# Patient Record
Sex: FEMALE | Race: WHITE | NOT HISPANIC OR LATINO | Employment: UNEMPLOYED | URBAN - METROPOLITAN AREA
[De-identification: names, ages, dates, MRNs, and addresses within clinical notes are randomized per-mention and may not be internally consistent; named-entity substitution may affect disease eponyms.]

---

## 2024-06-23 ENCOUNTER — HOSPITAL ENCOUNTER (INPATIENT)
Facility: CLINIC | Age: 89
LOS: 12 days | Discharge: NURSING, REHAB OR LONG-TERM CARE | DRG: 480 | End: 2024-07-05
Attending: EMERGENCY MEDICINE | Admitting: INTERNAL MEDICINE
Payer: COMMERCIAL

## 2024-06-23 ENCOUNTER — APPOINTMENT (OUTPATIENT)
Dept: GENERAL RADIOLOGY | Facility: CLINIC | Age: 89
DRG: 480 | End: 2024-06-23
Attending: EMERGENCY MEDICINE
Payer: COMMERCIAL

## 2024-06-23 ENCOUNTER — APPOINTMENT (OUTPATIENT)
Dept: CT IMAGING | Facility: CLINIC | Age: 89
DRG: 480 | End: 2024-06-23
Attending: EMERGENCY MEDICINE
Payer: COMMERCIAL

## 2024-06-23 DIAGNOSIS — I50.9 CONGESTIVE HEART FAILURE, UNSPECIFIED HF CHRONICITY, UNSPECIFIED HEART FAILURE TYPE (H): ICD-10-CM

## 2024-06-23 DIAGNOSIS — S72.145A CLOSED NONDISPLACED INTERTROCHANTERIC FRACTURE OF LEFT FEMUR, INITIAL ENCOUNTER (H): Primary | ICD-10-CM

## 2024-06-23 DIAGNOSIS — E87.1 HYPONATREMIA: ICD-10-CM

## 2024-06-23 DIAGNOSIS — E11.65 TYPE 2 DIABETES MELLITUS WITH HYPERGLYCEMIA, WITHOUT LONG-TERM CURRENT USE OF INSULIN (H): ICD-10-CM
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
## 2024-06-23 DIAGNOSIS — R33.9 URINARY RETENTION: ICD-10-CM

## 2024-06-23 DIAGNOSIS — N39.0 URINARY TRACT INFECTION WITH HEMATURIA, SITE UNSPECIFIED: ICD-10-CM

## 2024-06-23 DIAGNOSIS — S72.002A HIP FRACTURE, LEFT, CLOSED, INITIAL ENCOUNTER (H): ICD-10-CM

## 2024-06-23 DIAGNOSIS — R31.9 URINARY TRACT INFECTION WITH HEMATURIA, SITE UNSPECIFIED: ICD-10-CM

## 2024-06-23 LAB
ABO/RH(D): NORMAL
ALBUMIN SERPL BCG-MCNC: 4 G/DL (ref 3.5–5.2)
ALP SERPL-CCNC: 50 U/L (ref 40–150)
ALT SERPL W P-5'-P-CCNC: 27 U/L (ref 0–50)
ANION GAP SERPL CALCULATED.3IONS-SCNC: 11 MMOL/L (ref 7–15)
ANTIBODY SCREEN: NEGATIVE
APTT PPP: 33 SECONDS (ref 22–38)
AST SERPL W P-5'-P-CCNC: 27 U/L (ref 0–45)
BASOPHILS # BLD AUTO: 0.1 10E3/UL (ref 0–0.2)
BASOPHILS NFR BLD AUTO: 1 %
BILIRUB SERPL-MCNC: 0.4 MG/DL
BUN SERPL-MCNC: 36.7 MG/DL (ref 8–23)
CALCIUM SERPL-MCNC: 9.9 MG/DL (ref 8.2–9.6)
CHLORIDE SERPL-SCNC: 93 MMOL/L (ref 98–107)
CREAT SERPL-MCNC: 1.07 MG/DL (ref 0.51–0.95)
DEPRECATED HCO3 PLAS-SCNC: 29 MMOL/L (ref 22–29)
EGFRCR SERPLBLD CKD-EPI 2021: 49 ML/MIN/1.73M2
EOSINOPHIL # BLD AUTO: 0.1 10E3/UL (ref 0–0.7)
EOSINOPHIL NFR BLD AUTO: 2 %
ERYTHROCYTE [DISTWIDTH] IN BLOOD BY AUTOMATED COUNT: 14.8 % (ref 10–15)
GLUCOSE BLDC GLUCOMTR-MCNC: 230 MG/DL (ref 70–99)
GLUCOSE BLDC GLUCOMTR-MCNC: 236 MG/DL (ref 70–99)
GLUCOSE BLDC GLUCOMTR-MCNC: 285 MG/DL (ref 70–99)
GLUCOSE SERPL-MCNC: 214 MG/DL (ref 70–99)
HCT VFR BLD AUTO: 31.9 % (ref 35–47)
HGB BLD-MCNC: 10.1 G/DL (ref 11.7–15.7)
HOLD SPECIMEN: NORMAL
IMM GRANULOCYTES # BLD: 0 10E3/UL
IMM GRANULOCYTES NFR BLD: 1 %
INR PPP: 1.01 (ref 0.85–1.15)
LYMPHOCYTES # BLD AUTO: 0.7 10E3/UL (ref 0.8–5.3)
LYMPHOCYTES NFR BLD AUTO: 9 %
MCH RBC QN AUTO: 27.8 PG (ref 26.5–33)
MCHC RBC AUTO-ENTMCNC: 31.7 G/DL (ref 31.5–36.5)
MCV RBC AUTO: 88 FL (ref 78–100)
MONOCYTES # BLD AUTO: 0.5 10E3/UL (ref 0–1.3)
MONOCYTES NFR BLD AUTO: 8 %
NEUTROPHILS # BLD AUTO: 5.7 10E3/UL (ref 1.6–8.3)
NEUTROPHILS NFR BLD AUTO: 80 %
NRBC # BLD AUTO: 0 10E3/UL
NRBC BLD AUTO-RTO: 0 /100
PLATELET # BLD AUTO: 192 10E3/UL (ref 150–450)
POTASSIUM SERPL-SCNC: 4.4 MMOL/L (ref 3.4–5.3)
PROT SERPL-MCNC: 6.7 G/DL (ref 6.4–8.3)
RBC # BLD AUTO: 3.63 10E6/UL (ref 3.8–5.2)
SODIUM SERPL-SCNC: 133 MMOL/L (ref 135–145)
SPECIMEN EXPIRATION DATE: NORMAL
VIT D+METAB SERPL-MCNC: 67 NG/ML (ref 20–50)
WBC # BLD AUTO: 7.1 10E3/UL (ref 4–11)

## 2024-06-23 PROCEDURE — 73502 X-RAY EXAM HIP UNI 2-3 VIEWS: CPT

## 2024-06-23 PROCEDURE — 250N000012 HC RX MED GY IP 250 OP 636 PS 637: Performed by: INTERNAL MEDICINE

## 2024-06-23 PROCEDURE — 99285 EMERGENCY DEPT VISIT HI MDM: CPT | Mod: 25

## 2024-06-23 PROCEDURE — 80053 COMPREHEN METABOLIC PANEL: CPT | Performed by: EMERGENCY MEDICINE

## 2024-06-23 PROCEDURE — 85730 THROMBOPLASTIN TIME PARTIAL: CPT | Performed by: EMERGENCY MEDICINE

## 2024-06-23 PROCEDURE — 99222 1ST HOSP IP/OBS MODERATE 55: CPT | Performed by: INTERNAL MEDICINE

## 2024-06-23 PROCEDURE — 120N000001 HC R&B MED SURG/OB

## 2024-06-23 PROCEDURE — 72125 CT NECK SPINE W/O DYE: CPT

## 2024-06-23 PROCEDURE — 70450 CT HEAD/BRAIN W/O DYE: CPT

## 2024-06-23 PROCEDURE — 82306 VITAMIN D 25 HYDROXY: CPT | Performed by: COMMUNITY HEALTH WORKER

## 2024-06-23 PROCEDURE — 93005 ELECTROCARDIOGRAM TRACING: CPT

## 2024-06-23 PROCEDURE — 71045 X-RAY EXAM CHEST 1 VIEW: CPT

## 2024-06-23 PROCEDURE — 250N000011 HC RX IP 250 OP 636: Mod: JZ | Performed by: INTERNAL MEDICINE

## 2024-06-23 PROCEDURE — 85025 COMPLETE CBC W/AUTO DIFF WBC: CPT | Performed by: EMERGENCY MEDICINE

## 2024-06-23 PROCEDURE — 85610 PROTHROMBIN TIME: CPT | Performed by: EMERGENCY MEDICINE

## 2024-06-23 PROCEDURE — 86900 BLOOD TYPING SEROLOGIC ABO: CPT | Performed by: EMERGENCY MEDICINE

## 2024-06-23 PROCEDURE — 250N000011 HC RX IP 250 OP 636: Mod: JZ | Performed by: EMERGENCY MEDICINE

## 2024-06-23 PROCEDURE — 36415 COLL VENOUS BLD VENIPUNCTURE: CPT | Performed by: EMERGENCY MEDICINE

## 2024-06-23 PROCEDURE — 250N000013 HC RX MED GY IP 250 OP 250 PS 637: Performed by: INTERNAL MEDICINE

## 2024-06-23 PROCEDURE — 96374 THER/PROPH/DIAG INJ IV PUSH: CPT

## 2024-06-23 RX ORDER — MORPHINE SULFATE 2 MG/ML
2 INJECTION, SOLUTION INTRAMUSCULAR; INTRAVENOUS ONCE
Status: COMPLETED | OUTPATIENT
Start: 2024-06-23 | End: 2024-06-23

## 2024-06-23 RX ORDER — ACETAMINOPHEN 650 MG/1
650 SUPPOSITORY RECTAL EVERY 4 HOURS PRN
Status: DISCONTINUED | OUTPATIENT
Start: 2024-06-23 | End: 2024-07-05 | Stop reason: HOSPADM

## 2024-06-23 RX ORDER — BENAZEPRIL HYDROCHLORIDE 40 MG/1
40 TABLET ORAL DAILY
Status: ON HOLD | COMMUNITY
End: 2024-06-28

## 2024-06-23 RX ORDER — NALOXONE HYDROCHLORIDE 0.4 MG/ML
0.2 INJECTION, SOLUTION INTRAMUSCULAR; INTRAVENOUS; SUBCUTANEOUS
Status: DISCONTINUED | OUTPATIENT
Start: 2024-06-23 | End: 2024-07-05 | Stop reason: HOSPADM

## 2024-06-23 RX ORDER — LIDOCAINE 40 MG/G
CREAM TOPICAL
Status: DISCONTINUED | OUTPATIENT
Start: 2024-06-23 | End: 2024-06-24

## 2024-06-23 RX ORDER — ONDANSETRON 2 MG/ML
4 INJECTION INTRAMUSCULAR; INTRAVENOUS EVERY 6 HOURS PRN
Status: DISCONTINUED | OUTPATIENT
Start: 2024-06-23 | End: 2024-06-24

## 2024-06-23 RX ORDER — HYDROMORPHONE HYDROCHLORIDE 1 MG/ML
0.3 INJECTION, SOLUTION INTRAMUSCULAR; INTRAVENOUS; SUBCUTANEOUS
Status: DISCONTINUED | OUTPATIENT
Start: 2024-06-23 | End: 2024-06-24

## 2024-06-23 RX ORDER — ACETAMINOPHEN 500 MG
500 TABLET ORAL EVERY 6 HOURS PRN
Status: DISCONTINUED | OUTPATIENT
Start: 2024-06-23 | End: 2024-06-23

## 2024-06-23 RX ORDER — SIMVASTATIN 10 MG
1 TABLET ORAL EVERY EVENING
COMMUNITY
Start: 2023-06-01 | End: 2024-08-07

## 2024-06-23 RX ORDER — NALOXONE HYDROCHLORIDE 0.4 MG/ML
0.4 INJECTION, SOLUTION INTRAMUSCULAR; INTRAVENOUS; SUBCUTANEOUS
Status: DISCONTINUED | OUTPATIENT
Start: 2024-06-23 | End: 2024-07-05 | Stop reason: HOSPADM

## 2024-06-23 RX ORDER — AMOXICILLIN 250 MG
2 CAPSULE ORAL 2 TIMES DAILY PRN
Status: DISCONTINUED | OUTPATIENT
Start: 2024-06-23 | End: 2024-07-05 | Stop reason: HOSPADM

## 2024-06-23 RX ORDER — LORAZEPAM 0.5 MG
1 TABLET ORAL DAILY
COMMUNITY
End: 2024-08-07

## 2024-06-23 RX ORDER — AMOXICILLIN 250 MG
2 CAPSULE ORAL 2 TIMES DAILY
Status: DISCONTINUED | OUTPATIENT
Start: 2024-06-23 | End: 2024-06-24

## 2024-06-23 RX ORDER — MORPHINE SULFATE 4 MG/ML
3 INJECTION, SOLUTION INTRAMUSCULAR; INTRAVENOUS
Status: COMPLETED | OUTPATIENT
Start: 2024-06-23 | End: 2024-06-23

## 2024-06-23 RX ORDER — ACETAMINOPHEN 500 MG
1000 TABLET ORAL 3 TIMES DAILY
Status: DISCONTINUED | OUTPATIENT
Start: 2024-06-23 | End: 2024-06-24

## 2024-06-23 RX ORDER — SIMVASTATIN 10 MG
10 TABLET ORAL EVERY EVENING
Status: DISCONTINUED | OUTPATIENT
Start: 2024-06-23 | End: 2024-07-05 | Stop reason: HOSPADM

## 2024-06-23 RX ORDER — CALCIUM CARBONATE 500(1250)
1 TABLET ORAL DAILY
COMMUNITY
End: 2024-08-07

## 2024-06-23 RX ORDER — AMOXICILLIN 250 MG
1 CAPSULE ORAL 2 TIMES DAILY PRN
Status: DISCONTINUED | OUTPATIENT
Start: 2024-06-23 | End: 2024-07-05 | Stop reason: HOSPADM

## 2024-06-23 RX ORDER — CHLORTHALIDONE 25 MG/1
1 TABLET ORAL DAILY
Status: ON HOLD | COMMUNITY
Start: 2024-06-03 | End: 2024-06-28

## 2024-06-23 RX ORDER — METFORMIN HCL 500 MG
500 TABLET, EXTENDED RELEASE 24 HR ORAL 2 TIMES DAILY WITH MEALS
Status: ON HOLD | COMMUNITY
Start: 2023-06-01 | End: 2024-06-29

## 2024-06-23 RX ORDER — AMOXICILLIN 250 MG
1 CAPSULE ORAL 2 TIMES DAILY
Status: DISCONTINUED | OUTPATIENT
Start: 2024-06-23 | End: 2024-06-24

## 2024-06-23 RX ORDER — ONDANSETRON 4 MG/1
4 TABLET, ORALLY DISINTEGRATING ORAL EVERY 6 HOURS PRN
Status: DISCONTINUED | OUTPATIENT
Start: 2024-06-23 | End: 2024-06-24

## 2024-06-23 RX ORDER — NICOTINE POLACRILEX 4 MG
15-30 LOZENGE BUCCAL
Status: DISCONTINUED | OUTPATIENT
Start: 2024-06-23 | End: 2024-06-24

## 2024-06-23 RX ORDER — DEXTROSE MONOHYDRATE 25 G/50ML
25-50 INJECTION, SOLUTION INTRAVENOUS
Status: DISCONTINUED | OUTPATIENT
Start: 2024-06-23 | End: 2024-06-24

## 2024-06-23 RX ORDER — ACETAMINOPHEN 325 MG/1
650 TABLET ORAL EVERY 4 HOURS PRN
Status: DISCONTINUED | OUTPATIENT
Start: 2024-06-23 | End: 2024-06-23 | Stop reason: ALTCHOICE

## 2024-06-23 RX ORDER — AMLODIPINE AND BENAZEPRIL HYDROCHLORIDE 5; 40 MG/1; MG/1
1 CAPSULE ORAL DAILY
COMMUNITY
Start: 2023-06-01 | End: 2024-06-23

## 2024-06-23 RX ORDER — CALCIUM CARBONATE 500 MG/1
1000 TABLET, CHEWABLE ORAL 4 TIMES DAILY PRN
Status: DISCONTINUED | OUTPATIENT
Start: 2024-06-23 | End: 2024-07-05 | Stop reason: HOSPADM

## 2024-06-23 RX ORDER — ACETAMINOPHEN 500 MG
500 TABLET ORAL EVERY 6 HOURS PRN
Status: ON HOLD | COMMUNITY
End: 2024-06-25

## 2024-06-23 RX ADMIN — HYDROMORPHONE HYDROCHLORIDE 0.3 MG: 1 INJECTION, SOLUTION INTRAMUSCULAR; INTRAVENOUS; SUBCUTANEOUS at 14:11

## 2024-06-23 RX ADMIN — MORPHINE SULFATE 2 MG: 2 INJECTION, SOLUTION INTRAMUSCULAR; INTRAVENOUS at 11:06

## 2024-06-23 RX ADMIN — INSULIN ASPART 2 UNITS: 100 INJECTION, SOLUTION INTRAVENOUS; SUBCUTANEOUS at 22:37

## 2024-06-23 RX ADMIN — SENNOSIDES AND DOCUSATE SODIUM 1 TABLET: 50; 8.6 TABLET ORAL at 21:32

## 2024-06-23 RX ADMIN — MORPHINE SULFATE 3 MG: 4 INJECTION, SOLUTION INTRAMUSCULAR; INTRAVENOUS at 13:07

## 2024-06-23 RX ADMIN — ACETAMINOPHEN 1000 MG: 500 TABLET, FILM COATED ORAL at 18:23

## 2024-06-23 RX ADMIN — SIMVASTATIN 10 MG: 10 TABLET, FILM COATED ORAL at 21:32

## 2024-06-23 RX ADMIN — QUETIAPINE FUMARATE 6.25 MG: 25 TABLET ORAL at 21:32

## 2024-06-23 RX ADMIN — MORPHINE SULFATE 3 MG: 4 INJECTION, SOLUTION INTRAMUSCULAR; INTRAVENOUS at 11:43

## 2024-06-23 RX ADMIN — HYDROMORPHONE HYDROCHLORIDE 0.3 MG: 1 INJECTION, SOLUTION INTRAMUSCULAR; INTRAVENOUS; SUBCUTANEOUS at 18:23

## 2024-06-23 ASSESSMENT — ACTIVITIES OF DAILY LIVING (ADL)
ADLS_ACUITY_SCORE: 29
ADLS_ACUITY_SCORE: 35
ADLS_ACUITY_SCORE: 29
ADLS_ACUITY_SCORE: 35
ADLS_ACUITY_SCORE: 29
ADLS_ACUITY_SCORE: 29
ADLS_ACUITY_SCORE: 35
ADLS_ACUITY_SCORE: 28

## 2024-06-23 NOTE — PLAN OF CARE
Diagnosis:Fall left hip fx  POD#:Surgery 6/24  Mental Status:AxOx4  Activity/dangle: Bedrest, TAPS Q2 turn   Diet:Regular   Pain:IV dilaudid   Tiwari/Voiding:Due to void   02/LDA:S/L  D/C Date:TBD

## 2024-06-23 NOTE — ED PROVIDER NOTES
Emergency Department Note      History of Present Illness     Chief Complaint  Fall    HPI  Lisa Meraz is a 89 year old female who presents to the emergency room after mechanical fall earlier today.  She was attempting to make her bed, and she states that she fell to the ground, while trying to walk over to talk any other sheet.  Denies any loss of consciousness, but does state that she hit her head.  Her main concern is the pain in her left hip, EMS gave 50 mcg of fentanyl.  Left leg is shortened and internally rotated.  Patient is otherwise denying any other medical concerns at this time.      Independent Historian  No    Review of External Notes  Yes I have reviewed the patient's last family medicine note from 3 Margarita of this year the patient was seen for hypertension as well as type 2 diabetes.  Patient is also on dialysis.      Past Medical History   Medical History and Problem List  No past medical history on file.    Medications  No current outpatient medications on file.      Surgical History   No past surgical history on file.      Physical Exam   Patient Vitals for the past 24 hrs:   BP Temp Pulse Resp SpO2 Weight   06/23/24 1112 (!) 143/70 -- -- -- 95 % --   06/23/24 1045 -- 97.8  F (36.6  C) -- 16 -- 49.9 kg (110 lb)   06/23/24 1039 (!) 148/77 -- 90 -- 90 % --       Physical Exam  Vitals: reviewed by me  General: Pt seen on hospital [facility], [facility], cooperative, and alert to conversation  Eyes: Tracking well, clear conjunctiva BL  ENT: MMM, midline trachea.  No evidence of trauma to head or neck.  Lungs: No tachypnea, no accessory muscle use. No respiratory distress.   CV: Rate as above  Abd: Soft, non tender, no guarding, no rebound. Non distended   MSK: no joint effusion.  Left lower extremity is shortened and internally rotated, significant tenderness to palpation to the left hip.  The hip itself is warm and well-perfused, no tenting or skin breakdown or opening  Skin: No rash  Neuro: Clear  speech and no facial droop.  Psych: Not RIS, no e/o AH/VH      Diagnostics   Lab Results   Labs Ordered and Resulted from Time of ED Arrival to Time of ED Departure   COMPREHENSIVE METABOLIC PANEL - Abnormal       Result Value    Sodium 133 (*)     Potassium 4.4      Carbon Dioxide (CO2) 29      Anion Gap 11      Urea Nitrogen 36.7 (*)     Creatinine 1.07 (*)     GFR Estimate 49 (*)     Calcium 9.9 (*)     Chloride 93 (*)     Glucose 214 (*)     Alkaline Phosphatase 50      AST 27      ALT 27      Protein Total 6.7      Albumin 4.0      Bilirubin Total 0.4     CBC WITH PLATELETS AND DIFFERENTIAL - Abnormal    WBC Count 7.1      RBC Count 3.63 (*)     Hemoglobin 10.1 (*)     Hematocrit 31.9 (*)     MCV 88      MCH 27.8      MCHC 31.7      RDW 14.8      Platelet Count 192      % Neutrophils 80      % Lymphocytes 9      % Monocytes 8      % Eosinophils 2      % Basophils 1      % Immature Granulocytes 1      NRBCs per 100 WBC 0      Absolute Neutrophils 5.7      Absolute Lymphocytes 0.7 (*)     Absolute Monocytes 0.5      Absolute Eosinophils 0.1      Absolute Basophils 0.1      Absolute Immature Granulocytes 0.0      Absolute NRBCs 0.0     INR - Normal    INR 1.01     PARTIAL THROMBOPLASTIN TIME - Normal    aPTT 33     TYPE AND SCREEN, ADULT    ABO/RH(D) A POS      Antibody Screen Negative      SPECIMEN EXPIRATION DATE [id number]     ABO/RH TYPE AND SCREEN       Imaging  XR Chest 1 View   Final Result   IMPRESSION: Dual-lead left-sided cardiac conduction device. Trace right pleural effusion with adjacent opacities which likely reflect atelectasis. Right apical scarring. Cardiomegaly with central vascular congestion and mild interstitial edema. Mitral    annulus calcifications.      XR Pelvis w Hip Left 1 View   Final Result   IMPRESSION: Acute intertrochanteric fracture of the proximal left femur. Normal left hip joint alignment. Advanced degenerative arthritis in the left hip. No additional pelvic fracture. Right  total hip arthroplasty, with normal alignment, without    loosening or other complication. Advanced degenerative changes in the lower lumbar spine and SI joints. Severe atherosclerotic calcification.      CT Head w/o Contrast    (Results Pending)   CT Cervical Spine w/o Contrast    (Results Pending)       EKG   No results found for this or any previous visit.      Independent Interpretation  Yes I have independent reviewed the patient's chest x-ray, no obvious in the thorax noted.      ED Course    Medications Administered  Medications - No data to display    Procedures  Procedures         Social Determinants of Health adding to complexity of care  Transportation      Disposition  The patient was admitted to the hospital.       Medical Decision Making / Diagnosis   MIPS     None        MDM  This is a very pleasant 89-year-old female who presents to the emergency room after what appears to be a mechanical fall.  Thankfully she did not lose consciousness but she did hit her head.  Her trauma workup was negative apart from a left hip fracture, and while she is on blood thinners, thankfully there is no evidence of hematoma.  I spoke to the hospitalist team who is very generously agreed to accept care of the patient.  She will be monitored very carefully until the next inpatient bed is available.  We will treat her pain, and she unfortunately has required a couple doses of pain medication here, and treat her supportively as well.        ICD-10 Codes:    ICD-10-CM    1. Fall, initial encounter  W19.XXXA       2. Hip fracture, left, closed, initial encounter (H)  S72.002A                   Louis Lacey MD  06/23/24 1155

## 2024-06-23 NOTE — ED NOTES
Bed: ED03  Expected date:   Expected time:   Means of arrival:   Comments:   89 F fell from standing on thinners left hip pain eta 1027

## 2024-06-23 NOTE — H&P
[facility]    History and Physical - Hospitalist Service       Date of Admission:  6/23/2024    Assessment & Plan   Lisa Meraz is a 89 year old female who presents to the emergency room after mechanical fall earlier today.  She was attempting to make her bed, and she states that she fell to the ground, while trying to walk over to talk any other sheet.  Denies any loss of consciousness, but does state that she hit her head.     ## Mechanical Fall  ## CHT  ## Left IT fracture of proximal femur  Admit to inpatient  Ok to eat but NPO after midnight  Hold DOAC  IV dilaudid for pain, schedule tylenol   Bowel regimen  CTH and CT of cervical spine show no trauma    ## Anxiety  Patient is very anxious and has been unable to be calmed down by family.  They have requested a medication to keep her calm and comfortable.  Will try to avoid benzodiazepines if possible  Attempt seroquel 6.25 mg BID prn and 12.5 mg at bedtime prn.  Avoid anticholinergics    ## Hypertension  ## Intermittent atrial fibrillation  ## Hx of CHF with a PPM  Denies any chest pain or  palpitations  Hold DOAC  Continue benazepril, hold diuretic  Echo from 6/23 and 12/23 showed stable severe MR, LVEF of 69%.    ## DM Type II  Patient is on metformin, she is hyperglycemic with BG > 200  Mod CHO diet  Sliding scale insulin  Hold metformin    ##  Hyperlipidemia  Continue statin    ## Constipation  Bowel regimen    Diet:Mod CHO  DVT Prophylaxis: Pneumatic Compression Devices  Tiwari Catheter: Not present  Lines: None     Cardiac Monitoring: None  Code Status:  Full    Clinically Significant Risk Factors Present on Admission               # Drug Induced Coagulation Defect: home medication list includes an anticoagulant medication    # Hypertension: Home medication list includes antihypertensive(s)    # Anemia: based on hgb <11      Disposition Plan     Medically Ready for Discharge: Anticipated in 2-4 Days  -- to TCU      Ian Toth  MD Dusty  Hospitalist Service  [facility]  Securely message with Maciej (more info)  Text page via [facility] Paging/Directory     ______________________________________________________________________    Chief Complaint   Fall    History is obtained from the patient, electronic health record, and emergency department physician    History of Present Illness   Lisa Meraz is a 89 year old female who presents to the emergency room after mechanical fall earlier today.  She was attempting to make her bed, and she states that she fell to the ground, while trying to walk over to talk any other sheet.  Denies any loss of consciousness, but does state that she hit her head.  Her main concern is the pain in her left hip, EMS gave 50 mcg of fentanyl.  Left leg is shortened and internally rotated.  Patient is otherwise denying any other medical concerns at this time.    She was seen in the ER, stable vital signs, although hypertensive at times, normal oxygenation.  Na is 133, BUN 36, Cr 1.07, normal LFTs, glucose 214, WBC 7.1, hgb 10.1, .       Hip fx, making bed, prox left hip fx, CHT, no LOC.  XR pelvis shows acute left IT fracture of the femur, right OC is intact, CXR shows trace right pleural effusion and cardiac device.  CT of cervical spine show no fracture or subluxation.  CTH shows no acute process, but atrophy and SVID.      Past Medical History    Intermittent Afib on eliquis  Hx of complete AVB now with PPM  Hx of thromboembolism  DM Type II  Severe Mitral regurgitation  Hypertension  OA  Stage III CKD  Hyperlipidemia    Past Surgical History   Right OC  Appendectomy  Tonsillectomy  PPM placement    Prior to Admission Medications   Prior to Admission Medications   Prescriptions Last Dose Informant Patient Reported? Taking?   acetaminophen (TYLENOL) 500 MG tablet Unknown  Yes Yes   Sig: Take 500 mg by mouth every 6 hours as needed for mild pain   apixaban ANTICOAGULANT (ELIQUIS)  2.5 MG tablet 6/22/2024  Yes Yes   Sig: Take 2.5 mg by mouth 2 times daily   benazepril (LOTENSIN) 40 MG tablet 6/22/2024  Yes Yes   Sig: Take 40 mg by mouth daily   calcium carbonate (OS-JHONATHAN) 500 MG TABS 6/22/2024  Yes Yes   Sig: Take 1 tablet by mouth daily   chlorthalidone (HYGROTON) 25 MG tablet 6/22/2024  Yes Yes   Sig: Take 1 tablet by mouth daily   cholecalciferol ( ULTRA STRENGTH) 50 MCG (2000 UT) CAPS 6/22/2024  Yes Yes   Sig: Take 1 capsule by mouth daily   metFORMIN (GLUCOPHAGE XR) 500 MG 24 hr tablet 6/22/2024  Yes Yes   Sig: Take 500 mg by mouth 2 times daily (with meals)   methylcellulose (CITRUCEL) 500 MG TABS tablet 6/22/2024  Yes Yes   Sig: Take 500 mg by mouth daily   simvastatin (ZOCOR) 10 MG tablet 6/22/2024  Yes Yes   Sig: Take 1 tablet by mouth every evening      Facility-Administered Medications: None           Physical Exam   Vital Signs: Temp: 97.8  F (36.6  C)   BP: (!) 148/77 Pulse: 90   Resp: 16 SpO2: 90 %      Weight: 110 lbs 0 oz    General Appearance: Anxious, slightly confused, family at bedside  Respiratory: CTA  Cardiovascular: RRR, cardiac device noted  GI: soft, flat, NT, +BS  Skin: warm, dry with pallor  Other: LLE shortened and internally rotated    Medical Decision Making       67 MINUTES SPENT BY ME on the date of service doing chart review, history, exam, documentation & further activities per the note.      Data     I have personally reviewed the following data over the past 24 hrs:    7.1  \   10.1 (L)   / 192     133 (L) 93 (L) 36.7 (H) /  214 (H)   4.4 29 1.07 (H) \     ALT: 27 AST: 27 AP: 50 TBILI: 0.4   ALB: 4.0 TOT PROTEIN: 6.7 LIPASE: N/A     INR:  1.01 PTT:  33   D-dimer:  N/A Fibrinogen:  N/A       Imaging results reviewed over the past 24 hrs:   Recent Results (from the past 24 hour(s))   XR Pelvis w Hip Left 1 View    Narrative    EXAM: XR PELVIS AND HIP LEFT 1 VIEW  LOCATION: [facility]  DATE: 6/23/2024    INDICATION: Left hip pain  after traumatic injury.  COMPARISON: None.      Impression    IMPRESSION: Acute intertrochanteric fracture of the proximal left femur. Normal left hip joint alignment. Advanced degenerative arthritis in the left hip. No additional pelvic fracture. Right total hip arthroplasty, with normal alignment, without   loosening or other complication. Advanced degenerative changes in the lower lumbar spine and SI joints. Severe atherosclerotic calcification.   XR Chest 1 View    Narrative    EXAM: XR CHEST 1 VIEW  LOCATION: [facility]  DATE: 6/23/2024    INDICATION: Shortness of breath  COMPARISON: None.      Impression    IMPRESSION: Dual-lead left-sided cardiac conduction device. Trace right pleural effusion with adjacent opacities which likely reflect atelectasis. Right apical scarring. Cardiomegaly with central vascular congestion and mild interstitial edema. Mitral   annulus calcifications.

## 2024-06-23 NOTE — CONSULTS
[facility]    Orthopedic Consultation    Lisa Meraz MRN# [id number]   Age: 89 year old YOB: 1934     Date of Admission:   6/23/2024    Reason for consult: Left hip intertrochanteric fracture       Requesting provider: Ian Montano MD        Level of consult: Consult, follow and place orders           Assessment and Plan:   Assessment:   89 yo female with acute intertrochanteric fracture of the proximal left femur on XR 6/23 status post mechanical fall at home earlier today.        Plan:   Case was discussed with Dr. Tom Quinn, the on-call orthopedic trauma surgeon, who recommended fixation with a short intramedullary nail tomorrow with Dr. Dunbar. This was discussed with the patient and her family who were present in the room today.  They were agreeable to proceed as recommended and questions were answered to their satisfaction.    Non-weight bearing  NPO after midnight  Hold Eliquis  Pain regiment per medicine team, currently on IV dilaudid and scheduled tylenol           Chief Complaint:   Left hip pain s/p mechanical fall earlier today         History of Present Illness:   The patient is a very pleasant 89 year old female who presented this morning to the emergency room after a mechanical fall earlier today at her home where she lives independently.  She was attempting to make her bed when she fell to the ground.  Denies any loss of consciousness, but does state that she hit her head.  Fall was unwitnessed.  Her main concern is the pain in her left hip, EMS gave 50 mcg of fentanyl.  Patient with no other significant complaints at this time but does note that her left ankle seems a little sore since the fall.    CT head revealed no intracranial hemorrhage or acute infarct.    XR right hip with acute intertrochanteric fracture of the proximal left femur.  Normal left hip joint alignment.  No pelvic fracture.  Right total hip arthroplasty, with normal  alignment, without   loosening or other complication.          Past Medical History:   No past medical history on file.          Past Surgical History:   No past surgical history on file.          Social History:     Social History     Tobacco Use    Smoking status: Not on file    Smokeless tobacco: Not on file   Substance Use Topics    Alcohol use: Not on file             Family History:   No family history on file.          Immunizations:     VACCINE/DOSE   Diptheria   DPT   DTAP   HBIG   Hepatitis A   Hepatitis B   HIB   Influenza   Measles   Meningococcal   MMR   Mumps   Pneumococcal   Polio   Rubella   Small Pox   TDAP   Varicella   Zoster             Allergies:   No Known Allergies          Medications:     Current Facility-Administered Medications   Medication Dose Route Frequency Provider Last Rate Last Admin    acetaminophen (TYLENOL) Suppository 650 mg  650 mg Rectal Q4H PRN Ian Montano MD        acetaminophen (TYLENOL) tablet 500 mg  500 mg Oral Q6H PRN Ian Montano MD        calcium carbonate (TUMS) chewable tablet 1,000 mg  1,000 mg Oral 4x Daily PRN Ian Montano MD        HYDROmorphone (PF) (DILAUDID) injection 0.3 mg  0.3 mg Intravenous Q2H PRN Ian Montano MD        HYDROmorphone (PF) (DILAUDID) injection 0.3 mg  0.3 mg Intravenous Q2H PRN Ian Montano MD        lidocaine (LMX4) cream   Topical Q1H PRN Ian Montano MD        lidocaine 1 % 0.1-1 mL  0.1-1 mL Other Q1H PRN Ian Montano MD        naloxone (NARCAN) injection 0.2 mg  0.2 mg Intravenous Q2 Min PRN Ian Montano MD        Or    naloxone (NARCAN) injection 0.4 mg  0.4 mg Intravenous Q2 Min PRN Ian Montano MD        Or    naloxone (NARCAN) injection 0.2 mg  0.2 mg Intramuscular Q2 Min PRN Ian Montano MD        Or    naloxone (NARCAN) injection 0.4 mg  0.4 mg Intramuscular Q2 Min PRN Ian Montano MD        ondansetron (ZOFRAN ODT) ODT tab 4 mg  4 mg Oral Q6H PRN  Ian Montano MD        Or    ondansetron (ZOFRAN) injection 4 mg  4 mg Intravenous Q6H PRIan Jeter MD        senna-docusate (SENOKOT-S/PERICOLACE) 8.6-50 MG per tablet 1 tablet  1 tablet Oral BID PRN Ian Montano MD        Or    senna-docusate (SENOKOT-S/PERICOLACE) 8.6-50 MG per tablet 2 tablet  2 tablet Oral BID PRIan Jeter MD senna-jeisonte (SENOKOT-S/PERICOLACE) 8.6-50 MG per tablet 1 tablet  1 tablet Oral BID Ian Montano MD        Or    senna-docusate (SENOKOT-S/PERICOLACE) 8.6-50 MG per tablet 2 tablet  2 tablet Oral BID Ian Montano MD        simvastatin (ZOCOR) tablet 10 mg  10 mg Oral QPM Ian Montano MD        sodium chloride (PF) 0.9% PF flush 3 mL  3 mL Intracatheter Q8H Ian Montano MD        sodium chloride (PF) 0.9% PF flush 3 mL  3 mL Intracatheter q1 min prIan Jeter MD                 Review of Systems:   ROS:  10 point ROS neg other than the symptoms noted above in the HPI.            Physical Exam:   All vitals have been reviewed  Patient Vitals for the past 24 hrs:   BP Temp Temp src Pulse Resp SpO2 Weight   06/23/24 1347 134/82 97.8  F (36.6  C) Oral 95 18 96 % --   06/23/24 1321 (!) 141/71 -- -- -- -- 94 % --   06/23/24 1300 (!) 146/67 -- -- 61 -- 95 % --   06/23/24 1245 (!) 169/71 -- -- 74 -- 95 % --   06/23/24 1230 (!) 160/72 -- -- 68 -- 95 % --   06/23/24 1215 (!) 154/73 -- -- 69 -- 95 % --   06/23/24 1200 (!) 151/73 -- -- 78 -- 95 % --   06/23/24 1145 (!) 152/86 -- -- 77 -- 97 % --   06/23/24 1112 (!) 143/70 -- -- -- -- 95 % --   06/23/24 1045 -- 97.8  F (36.6  C) -- -- 16 -- 49.9 kg (110 lb)   06/23/24 1039 (!) 148/77 -- -- 90 -- 90 % --     No intake or output data in the 24 hours ending 06/23/24 1351      Physical Exam   Temp: 97.8  F (36.6  C) Temp src: Oral BP: 134/82 Pulse: 95   Resp: 18 SpO2: 96 % O2 Device: Nasal cannula Oxygen Delivery: 2 LPM  Vital Signs with Ranges  Temp:  [97.8  F (36.6  C)]  97.8  F (36.6  C)  Pulse:  [61-95] 95  Resp:  [16-18] 18  BP: (134-169)/(67-86) 134/82  SpO2:  [90 %-97 %] 96 %  110 lbs 0 oz    Constitutional: Pleasant, alert, appropriate, following commands.  Family present in room.  Respiratory: Unlabored breathing no audible wheeze  Cardiovascular: Regular rate and rhythm per pulses  GI: Abdomen non-distended.  Lymph/Hematologic: No lymphadenopathy in areas examined  Genitourinary: No murray  Skin: No rashes, no cyanosis, no edema.  Musculoskeletal:   Left lower extremity:  Skin is intact, no appreciable swelling of the left hip.  Minimal tenderness to palpation over the greater trochanter.  Hip pain with logroll  Hip range of motion limited secondary to pain  No tenderness with palpation of the knee  Minimal swelling of the left ankle, diffuse.  Patient very uncomfortable with examination today so will plan to further evaluate the ankle after surgery.    LLE sensation intact to light touch   FHL, EHL, dorsiflexion, plantarflexion intact  Palpable DP pulse, good capillary refill     Right lower extremity without obvious injury or deformity.  No lower extremity edema.      Neurologic: normal without focal findings, mental status, speech normal, alert and oriented x iii          Data:   All laboratory data reviewed  Results for orders placed or performed during the hospital encounter of 06/23/24   CT Head w/o Contrast     Status: None    Narrative    EXAM: CT HEAD W/O CONTRAST, CT CERVICAL SPINE W/O CONTRAST  LOCATION: [facility]  DATE: 6/23/2024    INDICATION: Fall with head injury.  COMPARISON: None.  TECHNIQUE:   1) Routine CT Head without IV contrast. Multiplanar reformats. Dose reduction techniques were used.  2) Routine CT Cervical Spine without IV contrast. Multiplanar reformats. Dose reduction techniques were used.    FINDINGS:   HEAD CT:   INTRACRANIAL CONTENTS: No intracranial hemorrhage, extraaxial collection, or mass effect.  No CT evidence  of acute infarct. Mild presumed chronic small vessel ischemic changes. Mild generalized volume loss. No hydrocephalus.     VISUALIZED ORBITS/SINUSES/MASTOIDS: Prior left cataract surgery. Visualized portions of the orbits are otherwise unremarkable. No paranasal sinus mucosal disease. No middle ear or mastoid effusion.    BONES/SOFT TISSUES: No scalp hematoma. No skull fracture.    CERVICAL SPINE CT:   VERTEBRA: Unremarkable cervical alignment and preserved vertebral body heights. No fracture or posttraumatic subluxation.     CANAL/FORAMINA: Mild diffuse cervical spondylosis, including mild loss of disc height and minor endplate spurring at C5-6 and C6-7. No CT evidence for high-grade central spinal canal stenosis. Mild neural foraminal stenosis at C5-6.    PARASPINAL: Prevertebral soft tissues within normal limits for thickness. Atherosclerotic calcifications at the carotid bifurcations. Diffusely heterogeneous, nodular thyroid gland. Interlobular septal thickening of the included lung apices, potentially   related to pulmonary edema.      Impression    IMPRESSION:  HEAD CT:  1.  No CT evidence for acute intracranial process.  2.  Brain atrophy and presumed chronic microvascular ischemic changes as above.    CERVICAL SPINE CT:  1.  No CT evidence for acute fracture or post traumatic subluxation.  2.  Mild cervical spondylosis greatest at C5-6.  3.  Interlobular septal thickening at the lung apices. Correlate with any clinical evidence for pulmonary edema.   CT Cervical Spine w/o Contrast     Status: None    Narrative    EXAM: CT HEAD W/O CONTRAST, CT CERVICAL SPINE W/O CONTRAST  LOCATION: [facility]  DATE: 6/23/2024    INDICATION: Fall with head injury.  COMPARISON: None.  TECHNIQUE:   1) Routine CT Head without IV contrast. Multiplanar reformats. Dose reduction techniques were used.  2) Routine CT Cervical Spine without IV contrast. Multiplanar reformats. Dose reduction techniques were  used.    FINDINGS:   HEAD CT:   INTRACRANIAL CONTENTS: No intracranial hemorrhage, extraaxial collection, or mass effect.  No CT evidence of acute infarct. Mild presumed chronic small vessel ischemic changes. Mild generalized volume loss. No hydrocephalus.     VISUALIZED ORBITS/SINUSES/MASTOIDS: Prior left cataract surgery. Visualized portions of the orbits are otherwise unremarkable. No paranasal sinus mucosal disease. No middle ear or mastoid effusion.    BONES/SOFT TISSUES: No scalp hematoma. No skull fracture.    CERVICAL SPINE CT:   VERTEBRA: Unremarkable cervical alignment and preserved vertebral body heights. No fracture or posttraumatic subluxation.     CANAL/FORAMINA: Mild diffuse cervical spondylosis, including mild loss of disc height and minor endplate spurring at C5-6 and C6-7. No CT evidence for high-grade central spinal canal stenosis. Mild neural foraminal stenosis at C5-6.    PARASPINAL: Prevertebral soft tissues within normal limits for thickness. Atherosclerotic calcifications at the carotid bifurcations. Diffusely heterogeneous, nodular thyroid gland. Interlobular septal thickening of the included lung apices, potentially   related to pulmonary edema.      Impression    IMPRESSION:  HEAD CT:  1.  No CT evidence for acute intracranial process.  2.  Brain atrophy and presumed chronic microvascular ischemic changes as above.    CERVICAL SPINE CT:  1.  No CT evidence for acute fracture or post traumatic subluxation.  2.  Mild cervical spondylosis greatest at C5-6.  3.  Interlobular septal thickening at the lung apices. Correlate with any clinical evidence for pulmonary edema.   XR Pelvis w Hip Left 1 View     Status: None    Narrative    EXAM: XR PELVIS AND HIP LEFT 1 VIEW  LOCATION: [facility]  DATE: 6/23/2024    INDICATION: Left hip pain after traumatic injury.  COMPARISON: None.      Impression    IMPRESSION: Acute intertrochanteric fracture of the proximal left femur.  Normal left hip joint alignment. Advanced degenerative arthritis in the left hip. No additional pelvic fracture. Right total hip arthroplasty, with normal alignment, without   loosening or other complication. Advanced degenerative changes in the lower lumbar spine and SI joints. Severe atherosclerotic calcification.   XR Chest 1 View     Status: None    Narrative    EXAM: XR CHEST 1 VIEW  LOCATION: [facility]  DATE: 6/23/2024    INDICATION: Shortness of breath  COMPARISON: None.      Impression    IMPRESSION: Dual-lead left-sided cardiac conduction device. Trace right pleural effusion with adjacent opacities which likely reflect atelectasis. Right apical scarring. Cardiomegaly with central vascular congestion and mild interstitial edema. Mitral   annulus calcifications.   Comprehensive metabolic panel     Status: Abnormal   Result Value Ref Range    Sodium 133 (L) 135 - 145 mmol/L    Potassium 4.4 3.4 - 5.3 mmol/L    Carbon Dioxide (CO2) 29 22 - 29 mmol/L    Anion Gap 11 7 - 15 mmol/L    Urea Nitrogen 36.7 (H) 8.0 - 23.0 mg/dL    Creatinine 1.07 (H) 0.51 - 0.95 mg/dL    GFR Estimate 49 (L) >60 mL/min/1.73m2    Calcium 9.9 (H) 8.2 - 9.6 mg/dL    Chloride 93 (L) 98 - 107 mmol/L    Glucose 214 (H) 70 - 99 mg/dL    Alkaline Phosphatase 50 40 - 150 U/L    AST 27 0 - 45 U/L    ALT 27 0 - 50 U/L    Protein Total 6.7 6.4 - 8.3 g/dL    Albumin 4.0 3.5 - 5.2 g/dL    Bilirubin Total 0.4 <=1.2 mg/dL   INR     Status: Normal   Result Value Ref Range    INR 1.01 0.85 - 1.15   Partial thromboplastin time     Status: Normal   Result Value Ref Range    aPTT 33 22 - 38 Seconds   CBC with platelets and differential     Status: Abnormal   Result Value Ref Range    WBC Count 7.1 4.0 - 11.0 10e3/uL    RBC Count 3.63 (L) 3.80 - 5.20 10e6/uL    Hemoglobin 10.1 (L) 11.7 - 15.7 g/dL    Hematocrit 31.9 (L) 35.0 - 47.0 %    MCV 88 78 - 100 fL    MCH 27.8 26.5 - 33.0 pg    MCHC 31.7 31.5 - 36.5 g/dL    RDW 14.8 10.0 -  15.0 %    Platelet Count 192 150 - 450 10e3/uL    % Neutrophils 80 %    % Lymphocytes 9 %    % Monocytes 8 %    % Eosinophils 2 %    % Basophils 1 %    % Immature Granulocytes 1 %    NRBCs per 100 WBC 0 <1 /100    Absolute Neutrophils 5.7 1.6 - 8.3 10e3/uL    Absolute Lymphocytes 0.7 (L) 0.8 - 5.3 10e3/uL    Absolute Monocytes 0.5 0.0 - 1.3 10e3/uL    Absolute Eosinophils 0.1 0.0 - 0.7 10e3/uL    Absolute Basophils 0.1 0.0 - 0.2 10e3/uL    Absolute Immature Granulocytes 0.0 <=0.4 10e3/uL    Absolute NRBCs 0.0 10e3/uL   Extra Tube     Status: None    Narrative    The following orders were created for panel order Extra Tube.  Procedure                               Abnormality         Status                     ---------                               -----------         ------                     Extra Red Top Tube[[id number]]                               Final result                 Please view results for these tests on the individual orders.   Extra Red Top Tube     Status: None   Result Value Ref Range    Hold Specimen [facility]    Adult Type and Screen     Status: None   Result Value Ref Range    ABO/RH(D) A POS     Antibody Screen Negative Negative    SPECIMEN EXPIRATION DATE [id number]    CBC with platelets differential     Status: Abnormal    Narrative    The following orders were created for panel order CBC with platelets differential.  Procedure                               Abnormality         Status                     ---------                               -----------         ------                     CBC with platelets and d...[[id number]]  Abnormal            Final result                 Please view results for these tests on the individual orders.   ABO/Rh type and screen     Status: None    Narrative    The following orders were created for panel order ABO/Rh type and screen.  Procedure                               Abnormality         Status                     ---------                                -----------         ------                     Adult Type and Screen[[id number]]                            Final result                 Please view results for these tests on the individual orders.     Attestation:  I have reviewed today's vital signs, notes, medications, labs and imaging with Dr. Quinn.  Amount of time performed on this consult: 50 minutes.    Carey Franco PA-C

## 2024-06-23 NOTE — PLAN OF CARE
Goal Outcome Evaluation:       Pt arrived from ED @1345. Pt A&Ox4. VSS on RA. Regular diet. Bedrest, T&R, TAPs System in place. Purewick in place, bladder scan @1400 is at 207cc. L PIV SL. IV Dilaudid given x1 for pain. CMS intact. Family at bedside. Ortho consult pending, Continue plan of care.

## 2024-06-23 NOTE — PROGRESS NOTES
RECEIVING UNIT ED HANDOFF REVIEW    ED Nurse Handoff Report was reviewed by: Yani Swasnon RN on June 23, 2024 at 1:07 PM

## 2024-06-24 ENCOUNTER — ANESTHESIA (OUTPATIENT)
Dept: SURGERY | Facility: CLINIC | Age: 89
DRG: 480 | End: 2024-06-24
Payer: COMMERCIAL

## 2024-06-24 ENCOUNTER — APPOINTMENT (OUTPATIENT)
Dept: GENERAL RADIOLOGY | Facility: CLINIC | Age: 89
DRG: 480 | End: 2024-06-24
Attending: STUDENT IN AN ORGANIZED HEALTH CARE EDUCATION/TRAINING PROGRAM
Payer: COMMERCIAL

## 2024-06-24 ENCOUNTER — ANESTHESIA EVENT (OUTPATIENT)
Dept: SURGERY | Facility: CLINIC | Age: 89
DRG: 480 | End: 2024-06-24
Payer: COMMERCIAL

## 2024-06-24 PROBLEM — S72.145A CLOSED NONDISPLACED INTERTROCHANTERIC FRACTURE OF LEFT FEMUR, INITIAL ENCOUNTER (H): Status: ACTIVE | Noted: 2024-06-24

## 2024-06-24 LAB
ANION GAP SERPL CALCULATED.3IONS-SCNC: 10 MMOL/L (ref 7–15)
ATRIAL RATE - MUSE: 326 BPM
BUN SERPL-MCNC: 44.8 MG/DL (ref 8–23)
CALCIUM SERPL-MCNC: 9.5 MG/DL (ref 8.2–9.6)
CHLORIDE SERPL-SCNC: 93 MMOL/L (ref 98–107)
CREAT SERPL-MCNC: 1.1 MG/DL (ref 0.51–0.95)
DEPRECATED HCO3 PLAS-SCNC: 29 MMOL/L (ref 22–29)
DIASTOLIC BLOOD PRESSURE - MUSE: NORMAL MMHG
EGFRCR SERPLBLD CKD-EPI 2021: 47 ML/MIN/1.73M2
ERYTHROCYTE [DISTWIDTH] IN BLOOD BY AUTOMATED COUNT: 14.9 % (ref 10–15)
GLUCOSE BLDC GLUCOMTR-MCNC: 160 MG/DL (ref 70–99)
GLUCOSE BLDC GLUCOMTR-MCNC: 176 MG/DL (ref 70–99)
GLUCOSE BLDC GLUCOMTR-MCNC: 185 MG/DL (ref 70–99)
GLUCOSE BLDC GLUCOMTR-MCNC: 210 MG/DL (ref 70–99)
GLUCOSE BLDC GLUCOMTR-MCNC: 230 MG/DL (ref 70–99)
GLUCOSE SERPL-MCNC: 185 MG/DL (ref 70–99)
HCT VFR BLD AUTO: 28.3 % (ref 35–47)
HGB BLD-MCNC: 8.8 G/DL (ref 11.7–15.7)
INTERPRETATION ECG - MUSE: NORMAL
MCH RBC QN AUTO: 26.7 PG (ref 26.5–33)
MCHC RBC AUTO-ENTMCNC: 31.1 G/DL (ref 31.5–36.5)
MCV RBC AUTO: 86 FL (ref 78–100)
P AXIS - MUSE: NORMAL DEGREES
PLATELET # BLD AUTO: 196 10E3/UL (ref 150–450)
POTASSIUM SERPL-SCNC: 5 MMOL/L (ref 3.4–5.3)
POTASSIUM SERPL-SCNC: 5.4 MMOL/L (ref 3.4–5.3)
PR INTERVAL - MUSE: NORMAL MS
QRS DURATION - MUSE: 148 MS
QT - MUSE: 494 MS
QTC - MUSE: 501 MS
R AXIS - MUSE: 16 DEGREES
RBC # BLD AUTO: 3.3 10E6/UL (ref 3.8–5.2)
SODIUM SERPL-SCNC: 132 MMOL/L (ref 135–145)
SYSTOLIC BLOOD PRESSURE - MUSE: NORMAL MMHG
T AXIS - MUSE: 118 DEGREES
VENTRICULAR RATE- MUSE: 62 BPM
WBC # BLD AUTO: 9.5 10E3/UL (ref 4–11)

## 2024-06-24 PROCEDURE — 370N000017 HC ANESTHESIA TECHNICAL FEE, PER MIN: Performed by: STUDENT IN AN ORGANIZED HEALTH CARE EDUCATION/TRAINING PROGRAM

## 2024-06-24 PROCEDURE — 36415 COLL VENOUS BLD VENIPUNCTURE: CPT | Performed by: STUDENT IN AN ORGANIZED HEALTH CARE EDUCATION/TRAINING PROGRAM

## 2024-06-24 PROCEDURE — 250N000009 HC RX 250: Performed by: NURSE ANESTHETIST, CERTIFIED REGISTERED

## 2024-06-24 PROCEDURE — 250N000011 HC RX IP 250 OP 636: Mod: JZ | Performed by: INTERNAL MEDICINE

## 2024-06-24 PROCEDURE — 36415 COLL VENOUS BLD VENIPUNCTURE: CPT | Performed by: INTERNAL MEDICINE

## 2024-06-24 PROCEDURE — 250N000013 HC RX MED GY IP 250 OP 250 PS 637

## 2024-06-24 PROCEDURE — 272N000001 HC OR GENERAL SUPPLY STERILE: Performed by: STUDENT IN AN ORGANIZED HEALTH CARE EDUCATION/TRAINING PROGRAM

## 2024-06-24 PROCEDURE — 250N000011 HC RX IP 250 OP 636: Mod: JZ | Performed by: NURSE ANESTHETIST, CERTIFIED REGISTERED

## 2024-06-24 PROCEDURE — 85048 AUTOMATED LEUKOCYTE COUNT: CPT | Performed by: INTERNAL MEDICINE

## 2024-06-24 PROCEDURE — 250N000011 HC RX IP 250 OP 636: Mod: JZ | Performed by: ANESTHESIOLOGY

## 2024-06-24 PROCEDURE — C1769 GUIDE WIRE: HCPCS | Performed by: STUDENT IN AN ORGANIZED HEALTH CARE EDUCATION/TRAINING PROGRAM

## 2024-06-24 PROCEDURE — 250N000011 HC RX IP 250 OP 636: Mod: JZ | Performed by: STUDENT IN AN ORGANIZED HEALTH CARE EDUCATION/TRAINING PROGRAM

## 2024-06-24 PROCEDURE — 80048 BASIC METABOLIC PNL TOTAL CA: CPT | Performed by: INTERNAL MEDICINE

## 2024-06-24 PROCEDURE — 999N000179 XR SURGERY CARM FLUORO LESS THAN 5 MIN W STILLS

## 2024-06-24 PROCEDURE — 360N000077 HC SURGERY LEVEL 4, PER MIN: Performed by: STUDENT IN AN ORGANIZED HEALTH CARE EDUCATION/TRAINING PROGRAM

## 2024-06-24 PROCEDURE — 250N000011 HC RX IP 250 OP 636: Performed by: NURSE ANESTHETIST, CERTIFIED REGISTERED

## 2024-06-24 PROCEDURE — 258N000003 HC RX IP 258 OP 636: Mod: JZ | Performed by: ANESTHESIOLOGY

## 2024-06-24 PROCEDURE — 250N000025 HC SEVOFLURANE, PER MIN: Performed by: STUDENT IN AN ORGANIZED HEALTH CARE EDUCATION/TRAINING PROGRAM

## 2024-06-24 PROCEDURE — 250N000011 HC RX IP 250 OP 636

## 2024-06-24 PROCEDURE — 250N000013 HC RX MED GY IP 250 OP 250 PS 637: Performed by: INTERNAL MEDICINE

## 2024-06-24 PROCEDURE — 258N000003 HC RX IP 258 OP 636: Mod: JZ | Performed by: STUDENT IN AN ORGANIZED HEALTH CARE EDUCATION/TRAINING PROGRAM

## 2024-06-24 PROCEDURE — 99233 SBSQ HOSP IP/OBS HIGH 50: CPT | Performed by: STUDENT IN AN ORGANIZED HEALTH CARE EDUCATION/TRAINING PROGRAM

## 2024-06-24 PROCEDURE — 999N000141 HC STATISTIC PRE-PROCEDURE NURSING ASSESSMENT: Performed by: STUDENT IN AN ORGANIZED HEALTH CARE EDUCATION/TRAINING PROGRAM

## 2024-06-24 PROCEDURE — 27244 TREAT THIGH FRACTURE: CPT | Performed by: ANESTHESIOLOGY

## 2024-06-24 PROCEDURE — 27244 TREAT THIGH FRACTURE: CPT | Performed by: NURSE ANESTHETIST, CERTIFIED REGISTERED

## 2024-06-24 PROCEDURE — 710N000009 HC RECOVERY PHASE 1, LEVEL 1, PER MIN: Performed by: STUDENT IN AN ORGANIZED HEALTH CARE EDUCATION/TRAINING PROGRAM

## 2024-06-24 PROCEDURE — 258N000003 HC RX IP 258 OP 636: Mod: JZ

## 2024-06-24 PROCEDURE — 120N000001 HC R&B MED SURG/OB

## 2024-06-24 PROCEDURE — 250N000009 HC RX 250: Performed by: ANESTHESIOLOGY

## 2024-06-24 PROCEDURE — 258N000003 HC RX IP 258 OP 636: Mod: JZ | Performed by: NURSE ANESTHETIST, CERTIFIED REGISTERED

## 2024-06-24 PROCEDURE — 250N000009 HC RX 250: Performed by: STUDENT IN AN ORGANIZED HEALTH CARE EDUCATION/TRAINING PROGRAM

## 2024-06-24 PROCEDURE — 272N000002 HC OR SUPPLY OTHER OPNP: Performed by: STUDENT IN AN ORGANIZED HEALTH CARE EDUCATION/TRAINING PROGRAM

## 2024-06-24 PROCEDURE — C1713 ANCHOR/SCREW BN/BN,TIS/BN: HCPCS | Performed by: STUDENT IN AN ORGANIZED HEALTH CARE EDUCATION/TRAINING PROGRAM

## 2024-06-24 PROCEDURE — 84132 ASSAY OF SERUM POTASSIUM: CPT | Performed by: STUDENT IN AN ORGANIZED HEALTH CARE EDUCATION/TRAINING PROGRAM

## 2024-06-24 PROCEDURE — 0QS736Z REPOSITION LEFT UPPER FEMUR WITH INTRAMEDULLARY INTERNAL FIXATION DEVICE, PERCUTANEOUS APPROACH: ICD-10-PCS | Performed by: STUDENT IN AN ORGANIZED HEALTH CARE EDUCATION/TRAINING PROGRAM

## 2024-06-24 PROCEDURE — 250N000011 HC RX IP 250 OP 636: Performed by: COMMUNITY HEALTH WORKER

## 2024-06-24 PROCEDURE — 99100 ANES PT EXTEME AGE<1 YR&>70: CPT | Performed by: NURSE ANESTHETIST, CERTIFIED REGISTERED

## 2024-06-24 DEVICE — LOCKING SCREW FOR IM NAIL Ø 5MM/ 34MM/ XL25/ STERILE: Type: IMPLANTABLE DEVICE | Site: HIP | Status: FUNCTIONAL

## 2024-06-24 DEVICE — TFNA FENESTRATED SCREW 100MM - STERILE
Type: IMPLANTABLE DEVICE | Site: HIP | Status: FUNCTIONAL
Brand: TFN-ADVANCE

## 2024-06-24 DEVICE — IMP NAIL SYN CAN FEM PROX TFNA 11X170MM 130D 04.037.142S: Type: IMPLANTABLE DEVICE | Site: HIP | Status: FUNCTIONAL

## 2024-06-24 RX ORDER — NALOXONE HYDROCHLORIDE 0.4 MG/ML
0.1 INJECTION, SOLUTION INTRAMUSCULAR; INTRAVENOUS; SUBCUTANEOUS
Status: DISCONTINUED | OUTPATIENT
Start: 2024-06-24 | End: 2024-06-24 | Stop reason: HOSPADM

## 2024-06-24 RX ORDER — TRANEXAMIC ACID 650 MG/1
1950 TABLET ORAL ONCE
Status: DISCONTINUED | OUTPATIENT
Start: 2024-06-24 | End: 2024-06-24

## 2024-06-24 RX ORDER — CEFAZOLIN SODIUM 1 G/3ML
1 INJECTION, POWDER, FOR SOLUTION INTRAMUSCULAR; INTRAVENOUS EVERY 8 HOURS
Status: DISCONTINUED | OUTPATIENT
Start: 2024-06-25 | End: 2024-06-25

## 2024-06-24 RX ORDER — PROPOFOL 10 MG/ML
INJECTION, EMULSION INTRAVENOUS PRN
Status: DISCONTINUED | OUTPATIENT
Start: 2024-06-24 | End: 2024-06-24

## 2024-06-24 RX ORDER — MAGNESIUM HYDROXIDE 1200 MG/15ML
LIQUID ORAL PRN
Status: DISCONTINUED | OUTPATIENT
Start: 2024-06-24 | End: 2024-06-24 | Stop reason: HOSPADM

## 2024-06-24 RX ORDER — SODIUM CHLORIDE, SODIUM LACTATE, POTASSIUM CHLORIDE, CALCIUM CHLORIDE 600; 310; 30; 20 MG/100ML; MG/100ML; MG/100ML; MG/100ML
INJECTION, SOLUTION INTRAVENOUS CONTINUOUS
Status: DISCONTINUED | OUTPATIENT
Start: 2024-06-24 | End: 2024-06-25

## 2024-06-24 RX ORDER — AMOXICILLIN 250 MG
1 CAPSULE ORAL 2 TIMES DAILY
Status: DISCONTINUED | OUTPATIENT
Start: 2024-06-24 | End: 2024-07-01

## 2024-06-24 RX ORDER — AMOXICILLIN 250 MG
1 CAPSULE ORAL 2 TIMES DAILY PRN
Status: CANCELLED | OUTPATIENT
Start: 2024-06-24

## 2024-06-24 RX ORDER — ONDANSETRON 4 MG/1
4 TABLET, ORALLY DISINTEGRATING ORAL EVERY 6 HOURS PRN
Status: DISCONTINUED | OUTPATIENT
Start: 2024-06-24 | End: 2024-07-05 | Stop reason: HOSPADM

## 2024-06-24 RX ORDER — PROCHLORPERAZINE MALEATE 5 MG
5 TABLET ORAL EVERY 6 HOURS PRN
Status: DISCONTINUED | OUTPATIENT
Start: 2024-06-24 | End: 2024-07-05 | Stop reason: HOSPADM

## 2024-06-24 RX ORDER — ONDANSETRON 2 MG/ML
4 INJECTION INTRAMUSCULAR; INTRAVENOUS EVERY 6 HOURS PRN
Status: DISCONTINUED | OUTPATIENT
Start: 2024-06-24 | End: 2024-07-05 | Stop reason: HOSPADM

## 2024-06-24 RX ORDER — CALCIUM GLUCONATE 20 MG/ML
1 INJECTION, SOLUTION INTRAVENOUS ONCE
Status: COMPLETED | OUTPATIENT
Start: 2024-06-24 | End: 2024-06-24

## 2024-06-24 RX ORDER — AMOXICILLIN 250 MG
2 CAPSULE ORAL 2 TIMES DAILY PRN
Status: CANCELLED | OUTPATIENT
Start: 2024-06-24

## 2024-06-24 RX ORDER — SODIUM CHLORIDE, SODIUM LACTATE, POTASSIUM CHLORIDE, CALCIUM CHLORIDE 600; 310; 30; 20 MG/100ML; MG/100ML; MG/100ML; MG/100ML
INJECTION, SOLUTION INTRAVENOUS CONTINUOUS
Status: DISCONTINUED | OUTPATIENT
Start: 2024-06-24 | End: 2024-06-24 | Stop reason: HOSPADM

## 2024-06-24 RX ORDER — ONDANSETRON 2 MG/ML
4 INJECTION INTRAMUSCULAR; INTRAVENOUS EVERY 30 MIN PRN
Status: DISCONTINUED | OUTPATIENT
Start: 2024-06-24 | End: 2024-06-24 | Stop reason: HOSPADM

## 2024-06-24 RX ORDER — HYDROXYZINE HYDROCHLORIDE 10 MG/1
10 TABLET, FILM COATED ORAL 3 TIMES DAILY PRN
Status: DISCONTINUED | OUTPATIENT
Start: 2024-06-24 | End: 2024-06-24

## 2024-06-24 RX ORDER — HYDROMORPHONE HYDROCHLORIDE 1 MG/ML
INJECTION, SOLUTION INTRAMUSCULAR; INTRAVENOUS; SUBCUTANEOUS PRN
Status: DISCONTINUED | OUTPATIENT
Start: 2024-06-24 | End: 2024-06-24

## 2024-06-24 RX ORDER — OXYCODONE HYDROCHLORIDE 5 MG/1
10 TABLET ORAL EVERY 4 HOURS PRN
Status: DISCONTINUED | OUTPATIENT
Start: 2024-06-24 | End: 2024-07-05 | Stop reason: HOSPADM

## 2024-06-24 RX ORDER — FENTANYL CITRATE 0.05 MG/ML
50 INJECTION, SOLUTION INTRAMUSCULAR; INTRAVENOUS
Status: COMPLETED | OUTPATIENT
Start: 2024-06-24 | End: 2024-06-24

## 2024-06-24 RX ORDER — LIDOCAINE 40 MG/G
CREAM TOPICAL
Status: DISCONTINUED | OUTPATIENT
Start: 2024-06-24 | End: 2024-07-05 | Stop reason: HOSPADM

## 2024-06-24 RX ORDER — ACETAMINOPHEN 325 MG/1
650 TABLET ORAL EVERY 4 HOURS PRN
Status: DISCONTINUED | OUTPATIENT
Start: 2024-06-27 | End: 2024-07-05 | Stop reason: HOSPADM

## 2024-06-24 RX ORDER — DEXAMETHASONE SODIUM PHOSPHATE 4 MG/ML
4 INJECTION, SOLUTION INTRA-ARTICULAR; INTRALESIONAL; INTRAMUSCULAR; INTRAVENOUS; SOFT TISSUE
Status: DISCONTINUED | OUTPATIENT
Start: 2024-06-24 | End: 2024-06-24 | Stop reason: HOSPADM

## 2024-06-24 RX ORDER — NICOTINE POLACRILEX 4 MG
15-30 LOZENGE BUCCAL
Status: DISCONTINUED | OUTPATIENT
Start: 2024-06-24 | End: 2024-07-02

## 2024-06-24 RX ORDER — HYDROMORPHONE HCL IN WATER/PF 6 MG/30 ML
0.2 PATIENT CONTROLLED ANALGESIA SYRINGE INTRAVENOUS
Status: DISCONTINUED | OUTPATIENT
Start: 2024-06-24 | End: 2024-06-24

## 2024-06-24 RX ORDER — IBUPROFEN 600 MG/1
600 TABLET, FILM COATED ORAL EVERY 6 HOURS PRN
Status: DISCONTINUED | OUTPATIENT
Start: 2024-06-24 | End: 2024-06-26

## 2024-06-24 RX ORDER — FENTANYL CITRATE 0.05 MG/ML
25 INJECTION, SOLUTION INTRAMUSCULAR; INTRAVENOUS EVERY 5 MIN PRN
Status: DISCONTINUED | OUTPATIENT
Start: 2024-06-24 | End: 2024-06-24 | Stop reason: HOSPADM

## 2024-06-24 RX ORDER — CALCIUM CARBONATE 500 MG/1
1000 TABLET, CHEWABLE ORAL 4 TIMES DAILY PRN
Status: CANCELLED | OUTPATIENT
Start: 2024-06-24

## 2024-06-24 RX ORDER — PROPOFOL 10 MG/ML
INJECTION, EMULSION INTRAVENOUS CONTINUOUS PRN
Status: DISCONTINUED | OUTPATIENT
Start: 2024-06-24 | End: 2024-06-24

## 2024-06-24 RX ORDER — HYDROMORPHONE HCL IN WATER/PF 6 MG/30 ML
0.4 PATIENT CONTROLLED ANALGESIA SYRINGE INTRAVENOUS
Status: DISCONTINUED | OUTPATIENT
Start: 2024-06-24 | End: 2024-06-24

## 2024-06-24 RX ORDER — HYDROMORPHONE HCL IN WATER/PF 6 MG/30 ML
0.4 PATIENT CONTROLLED ANALGESIA SYRINGE INTRAVENOUS EVERY 5 MIN PRN
Status: DISCONTINUED | OUTPATIENT
Start: 2024-06-24 | End: 2024-06-24 | Stop reason: HOSPADM

## 2024-06-24 RX ORDER — CEFAZOLIN SODIUM/WATER 2 G/20 ML
2 SYRINGE (ML) INTRAVENOUS
Status: COMPLETED | OUTPATIENT
Start: 2024-06-24 | End: 2024-06-24

## 2024-06-24 RX ORDER — ONDANSETRON 2 MG/ML
INJECTION INTRAMUSCULAR; INTRAVENOUS PRN
Status: DISCONTINUED | OUTPATIENT
Start: 2024-06-24 | End: 2024-06-24

## 2024-06-24 RX ORDER — LIDOCAINE HYDROCHLORIDE 20 MG/ML
INJECTION, SOLUTION INFILTRATION; PERINEURAL PRN
Status: DISCONTINUED | OUTPATIENT
Start: 2024-06-24 | End: 2024-06-24

## 2024-06-24 RX ORDER — HYDROMORPHONE HCL IN WATER/PF 6 MG/30 ML
0.2 PATIENT CONTROLLED ANALGESIA SYRINGE INTRAVENOUS EVERY 5 MIN PRN
Status: DISCONTINUED | OUTPATIENT
Start: 2024-06-24 | End: 2024-06-24 | Stop reason: HOSPADM

## 2024-06-24 RX ORDER — POLYETHYLENE GLYCOL 3350 17 G/17G
17 POWDER, FOR SOLUTION ORAL DAILY
Status: DISCONTINUED | OUTPATIENT
Start: 2024-06-25 | End: 2024-07-01

## 2024-06-24 RX ORDER — ONDANSETRON 4 MG/1
4 TABLET, ORALLY DISINTEGRATING ORAL EVERY 30 MIN PRN
Status: DISCONTINUED | OUTPATIENT
Start: 2024-06-24 | End: 2024-06-24 | Stop reason: HOSPADM

## 2024-06-24 RX ORDER — LIDOCAINE 40 MG/G
CREAM TOPICAL
Status: DISCONTINUED | OUTPATIENT
Start: 2024-06-24 | End: 2024-06-24

## 2024-06-24 RX ORDER — METHOCARBAMOL 500 MG/1
250 TABLET ORAL 3 TIMES DAILY PRN
Status: DISCONTINUED | OUTPATIENT
Start: 2024-06-24 | End: 2024-07-05 | Stop reason: HOSPADM

## 2024-06-24 RX ORDER — FENTANYL CITRATE 0.05 MG/ML
50 INJECTION, SOLUTION INTRAMUSCULAR; INTRAVENOUS EVERY 5 MIN PRN
Status: DISCONTINUED | OUTPATIENT
Start: 2024-06-24 | End: 2024-06-24 | Stop reason: HOSPADM

## 2024-06-24 RX ORDER — BISACODYL 10 MG
10 SUPPOSITORY, RECTAL RECTAL DAILY PRN
Status: DISCONTINUED | OUTPATIENT
Start: 2024-06-27 | End: 2024-07-05 | Stop reason: HOSPADM

## 2024-06-24 RX ORDER — LIDOCAINE 40 MG/G
CREAM TOPICAL
Status: CANCELLED | OUTPATIENT
Start: 2024-06-24

## 2024-06-24 RX ORDER — DEXTROSE MONOHYDRATE 25 G/50ML
25-50 INJECTION, SOLUTION INTRAVENOUS
Status: DISCONTINUED | OUTPATIENT
Start: 2024-06-24 | End: 2024-07-02

## 2024-06-24 RX ORDER — ACETAMINOPHEN 325 MG/1
975 TABLET ORAL EVERY 8 HOURS
Status: COMPLETED | OUTPATIENT
Start: 2024-06-24 | End: 2024-06-27

## 2024-06-24 RX ORDER — HYDROMORPHONE HCL IN WATER/PF 6 MG/30 ML
0.2 PATIENT CONTROLLED ANALGESIA SYRINGE INTRAVENOUS
Status: DISCONTINUED | OUTPATIENT
Start: 2024-06-24 | End: 2024-07-05 | Stop reason: HOSPADM

## 2024-06-24 RX ORDER — HYDROMORPHONE HYDROCHLORIDE 1 MG/ML
0.5 INJECTION, SOLUTION INTRAMUSCULAR; INTRAVENOUS; SUBCUTANEOUS
Status: DISCONTINUED | OUTPATIENT
Start: 2024-06-24 | End: 2024-07-05 | Stop reason: HOSPADM

## 2024-06-24 RX ORDER — OXYCODONE HYDROCHLORIDE 5 MG/1
5 TABLET ORAL EVERY 4 HOURS PRN
Status: DISCONTINUED | OUTPATIENT
Start: 2024-06-24 | End: 2024-07-05 | Stop reason: HOSPADM

## 2024-06-24 RX ORDER — CEFAZOLIN SODIUM/WATER 2 G/20 ML
2 SYRINGE (ML) INTRAVENOUS SEE ADMIN INSTRUCTIONS
Status: DISCONTINUED | OUTPATIENT
Start: 2024-06-24 | End: 2024-06-24

## 2024-06-24 RX ADMIN — PHENYLEPHRINE HYDROCHLORIDE 0.5 MCG/KG/MIN: 10 INJECTION INTRAVENOUS at 15:57

## 2024-06-24 RX ADMIN — Medication 2 G: at 15:49

## 2024-06-24 RX ADMIN — FENTANYL CITRATE 25 MCG: 50 INJECTION, SOLUTION INTRAMUSCULAR; INTRAVENOUS at 17:35

## 2024-06-24 RX ADMIN — PROPOFOL 100 MG: 10 INJECTION, EMULSION INTRAVENOUS at 15:54

## 2024-06-24 RX ADMIN — HYDROMORPHONE HYDROCHLORIDE 0.3 MG: 1 INJECTION, SOLUTION INTRAMUSCULAR; INTRAVENOUS; SUBCUTANEOUS at 06:00

## 2024-06-24 RX ADMIN — FENTANYL CITRATE 25 MCG: 50 INJECTION, SOLUTION INTRAMUSCULAR; INTRAVENOUS at 17:41

## 2024-06-24 RX ADMIN — ACETAMINOPHEN 1000 MG: 500 TABLET, FILM COATED ORAL at 08:44

## 2024-06-24 RX ADMIN — Medication 20 ML: at 15:43

## 2024-06-24 RX ADMIN — ONDANSETRON 4 MG: 2 INJECTION INTRAMUSCULAR; INTRAVENOUS at 16:38

## 2024-06-24 RX ADMIN — HYDROMORPHONE HYDROCHLORIDE 0.5 MG: 1 INJECTION, SOLUTION INTRAMUSCULAR; INTRAVENOUS; SUBCUTANEOUS at 16:22

## 2024-06-24 RX ADMIN — LIDOCAINE HYDROCHLORIDE 40 MG: 20 INJECTION, SOLUTION INFILTRATION; PERINEURAL at 15:54

## 2024-06-24 RX ADMIN — ROCURONIUM BROMIDE 30 MG: 50 INJECTION, SOLUTION INTRAVENOUS at 15:54

## 2024-06-24 RX ADMIN — PROPOFOL 25 MCG/KG/MIN: 10 INJECTION, EMULSION INTRAVENOUS at 15:57

## 2024-06-24 RX ADMIN — OXYCODONE HYDROCHLORIDE 5 MG: 5 TABLET ORAL at 20:11

## 2024-06-24 RX ADMIN — TRANEXAMIC ACID 1 G: 1 INJECTION, SOLUTION INTRAVENOUS at 15:55

## 2024-06-24 RX ADMIN — HYDROMORPHONE HYDROCHLORIDE 0.3 MG: 1 INJECTION, SOLUTION INTRAMUSCULAR; INTRAVENOUS; SUBCUTANEOUS at 13:52

## 2024-06-24 RX ADMIN — SODIUM CHLORIDE, POTASSIUM CHLORIDE, SODIUM LACTATE AND CALCIUM CHLORIDE: 600; 310; 30; 20 INJECTION, SOLUTION INTRAVENOUS at 15:49

## 2024-06-24 RX ADMIN — SIMVASTATIN 10 MG: 10 TABLET, FILM COATED ORAL at 20:10

## 2024-06-24 RX ADMIN — QUETIAPINE 12.5 MG: 25 TABLET, FILM COATED ORAL at 21:36

## 2024-06-24 RX ADMIN — SUGAMMADEX 100 MG: 100 INJECTION, SOLUTION INTRAVENOUS at 16:55

## 2024-06-24 RX ADMIN — SUGAMMADEX 50 MG: 100 INJECTION, SOLUTION INTRAVENOUS at 17:00

## 2024-06-24 RX ADMIN — SODIUM CHLORIDE, POTASSIUM CHLORIDE, SODIUM LACTATE AND CALCIUM CHLORIDE: 600; 310; 30; 20 INJECTION, SOLUTION INTRAVENOUS at 18:39

## 2024-06-24 RX ADMIN — FENTANYL CITRATE 50 MCG: 50 INJECTION, SOLUTION INTRAMUSCULAR; INTRAVENOUS at 15:35

## 2024-06-24 RX ADMIN — CALCIUM GLUCONATE 1 G: 20 INJECTION, SOLUTION INTRAVENOUS at 12:12

## 2024-06-24 RX ADMIN — ACETAMINOPHEN 975 MG: 325 TABLET, FILM COATED ORAL at 21:36

## 2024-06-24 RX ADMIN — INSULIN ASPART 1 UNITS: 100 INJECTION, SOLUTION INTRAVENOUS; SUBCUTANEOUS at 22:44

## 2024-06-24 RX ADMIN — HYDROMORPHONE HYDROCHLORIDE 0.3 MG: 1 INJECTION, SOLUTION INTRAMUSCULAR; INTRAVENOUS; SUBCUTANEOUS at 08:44

## 2024-06-24 RX ADMIN — PHENYLEPHRINE HYDROCHLORIDE 100 MCG: 10 INJECTION INTRAVENOUS at 15:54

## 2024-06-24 RX ADMIN — CEFAZOLIN 1 G: 1 INJECTION, POWDER, FOR SOLUTION INTRAMUSCULAR; INTRAVENOUS at 23:25

## 2024-06-24 RX ADMIN — SODIUM CHLORIDE 500 ML: 9 INJECTION, SOLUTION INTRAVENOUS at 10:56

## 2024-06-24 ASSESSMENT — ACTIVITIES OF DAILY LIVING (ADL)
ADLS_ACUITY_SCORE: 29

## 2024-06-24 ASSESSMENT — LIFESTYLE VARIABLES: TOBACCO_USE: 0

## 2024-06-24 ASSESSMENT — ENCOUNTER SYMPTOMS
DYSRHYTHMIAS: 1
SEIZURES: 0

## 2024-06-24 NOTE — ANESTHESIA PROCEDURE NOTES
Fascia iliaca Procedure Note    Pre-Procedure   Staff -        Anesthesiologist:  Lissette Rodriguez MD       Performed By: anesthesiologist       Location: pre-op       Pre-Anesthestic Checklist: patient identified, IV checked, site marked, risks and benefits discussed, informed consent, monitors and equipment checked, pre-op evaluation, at physician/surgeon's request and post-op pain management  Timeout:       Correct Patient: Yes        Correct Procedure: Yes        Correct Site: Yes        Correct Position: Yes        Correct Laterality: Yes        Site Marked: Yes  Procedure Documentation  Procedure: Fascia iliaca       Laterality: left       Patient Position: supine       Skin prep: Chloraprep       Local skin infiltrated with 1 mL of 1% lidocaine.        Needle Type: insulated       Needle Gauge: 21.        Needle Length (millimeters): 100        Ultrasound guided       1. Ultrasound was used to identify targeted nerve, plexus, vascular marker, or fascial plane and place a needle adjacent to it in real-time.       2. Ultrasound was used to visualize the spread of anesthetic in close proximity to the above referenced structure.       3. A permanent image is entered into the patient's record.       4. The visualized anatomic structures appeared normal.       5. There were no apparent abnormal pathologic findings.    Assessment/Narrative         The placement was negative for: blood aspirated, painful injection and site bleeding       Paresthesias: No.       Bolus given via needle..        Secured via.        Insertion/Infusion Method: Single Shot       Complications: none    Medication(s) Administered   Bupivacaine 0.5% w/ 1:400K Epi (Injection) - Injection   20 mL - 6/24/2024 3:43:00 PM   Comments:  Bolus via needle, 20 ml of 0.5% bupivacaine with 1:400,000 epinephrine.  Patient tolerated well, was mildly sedated but communicative throughout the procedure.   The surgeon has given a verbal order transferring  "care of this patient to me for the performance of regional analgesia block for post op pain control. It is requested of me because I am uniquely trained and qualified to perform this block and the surgeon is neither trained nor qualified to perform this procedure.         FOR [facility] ([facility]iatric[facility] [place]) ONLY:   Pain Team Contact information: please page the Pain Team [employer]. Search \"Pain\". During daytime hours, please page the attending first. At night please page the resident first.      "

## 2024-06-24 NOTE — PROVIDER NOTIFICATION
Paged Hospitalist  regarding patients K+ redraw going down to 5.0. No further interventions, MD aware, Continue plan of care.

## 2024-06-24 NOTE — OP NOTE
[facility]  Orthopedic Operative Note    Short Cephallomedullary Nailing    Lisa Meraz MRN# [id number]   YOB: 1934  Procedure Date:  6/24/2024  Age: 89 year old     PREOPERATIVE DIAGNOSIS:  left intertrochanteric femur fracture.    POSTOPERATIVE DIAGNOSIS:  left intertrochanteric femur fracture .     PROCEDURE PERFORMED:  Surgical treatment of left intertrochanteric femur fracture with cephalomedullary device    SURGEON:  Richy Dunbar MD    FIRST ASSISTANT: Reese Lofton PA-C, whose assistance was required for positioning, prep and drape, instrumentation, and closure    ANESTHESIA:  General     EBL: 100 cc    COMPLICATIONS: None     DISPOSITION: Post Anesthesia Care Unit    CONDITION: Stable     INDICATIONS:  Lisa Meraz is a 89 year old female presenting with a left intertrochanteric femur fracture.  Discussed both operative and nonoperative management. Risks of surgery discussed included but not limited to bleeding, infection, damage to surrounding neurovascular structures, leg length inequality, nonunion, malunion, need for revision surgery, blood clots, pulmonary embolus, and the medical risks of anesthesia.  Benefits of surgery discussed included improved chance of union in acceptable alignment, improve function, and reduction in medical risks of hip fractures.  Alternatives discussed included nonoperative management which I did not recommend.  The patient acknowledges risks and wishes to proceed. The informed consent was signed and documented.  I met with the patient preoperatively and marked the operative extremity.      IMPLANTS:  Implant Name Type Inv. Item Serial No.  Lot No. LRB No. Used Action   IMP NAIL SYN CAN FEM PROX TFNA [id number] 130D 04.037.142S - [id number] Met[employer]ware/[place] IMP NAIL SYN CAN FEM PROX TFNA [id number] 130D 04.037.142S  [facility] [id number] Left 1 Implanted   IMP SCR SYN TFNA FENESTRATED LAG 100MM  04.038.200S - [id number] Metallic Hardware/[employer] IMP SCR SYN TFNA FENESTRATED LAG 100MM 04.038.200S  [employer] [id number] Left 1 Implanted   SCREW BN 34MM 5MM LCK X25 STRL IM NL 04.045.034S - [id number] Metallic Hardware/[employer] SCREW BN 34MM 5MM LCK X25 STRL IM NL 04.045.034S  [employer] [id number] Left 1 Implanted       PROCEDURE: The patient was brought to the OR and underwent general anesthesia.  The patient was subsequently transferred in a supine position to the fracture table.  The peroneal post was placed.  The perineum was engaged gently into the perineal post.  Both feet were placed in well-padded traction boots.  Patient placed in a scissored position with the fractured side and in-line traction and the well leg placed in approximately 30 degrees of hip extension.  Fluoroscopic unit was brought into the field and provisional reduction was achieved.  The left hip was prepped and draped in normal sterile fashion.  Antibiotic administration was confirmed and timeout was completed.  Following generalized agreement, the starting guidepin was advanced down to the start point on the greater trochanter.  We confirmed the start point on fluoroscopy and then advanced the guidewire into the femur.  The position of the guidewire was confirmed with fluoroscopy.  We then made an incision around the pin and gained access to the intramedullary nail using the entry reamer.  Ball tip guidewire was advanced and a 12.5 mm reamer was used to prepare the canal.  A [employer]s TFN a 130 degree 11 mm x 170 mm nail was then advanced down the canal.  This was advanced to an appropriate depth, confirmed by fluoroscopy.  We then advanced the guide for the lag screw down to bone through a small lateral thigh incision.   The guidewire was then advanced into the central position within the femoral neck and head confirmed by fluoroscopy.  We then measured for and selected an appropriate length lag screw.  I tapped for the screw as her  bone quality was actually quite good.  We reamed for the lag screw and then placed a lag screw by hand.  The setscrew was advanced and tightened.  The guide for the interlocking screw was advanced through the jig down to bone through a small lateral thigh incision.  We then drilled for and placed one statically interlocked distal interlocking screw.  Good bicortical purchase was achieved.  All instruments were then removed.  Final fluoroscopic imaging was obtained demonstrating good alignment of the fracture good positioning of all hardware.  We then thoroughly irrigated and closed in layers with 0 Vicryl for the proximal fascia, 2-0 Vicryl, and staples.  The wounds were anesthetized with bupivacaine and sterile dressings were applied.  Patient was transported to the recovery room in stable condition, sustaining no complications. There were no complications and the patient tolerated well.    PLAN:    Activity: Weight-bear as tolerated, range of motion as tolerated, physical therapy and occupational therapy consults  Antibiotics: 24 hours IV antibiotics per standard postop protocol  DVT:  SCD's and chemical prophylaxis with Eliquis starting tomorrow  Discharge:  Case management social work consult for placement  Follow-up:  Follow up at [facility] in 2 weeks with repeat x-rays AP and lateral left hip and femur    Nils Dunbar MD  [facility]

## 2024-06-24 NOTE — ANESTHESIA PREPROCEDURE EVALUATION
Anesthesia Pre-Procedure Evaluation    Patient: Lisa Meraz   MRN: [id number] : 1934        Procedure : Procedure(s):  Open reduction internal fixation of left intertrochanteric fracture with short intramedullary nail          No past medical history on file.   No past surgical history on file.   No Known Allergies   Social History     Tobacco Use    Smoking status: Not on file    Smokeless tobacco: Not on file   Substance Use Topics    Alcohol use: Not on file      Wt Readings from Last 1 Encounters:   24 49.9 kg (110 lb)        Prior to Admission medications    Medication Sig Start Date End Date Taking? Authorizing Provider   acetaminophen (TYLENOL) 500 MG tablet Take 500 mg by mouth every 6 hours as needed for mild pain   Yes Unknown, Entered By History   apixaban ANTICOAGULANT (ELIQUIS) 2.5 MG tablet Take 2.5 mg by mouth 2 times daily 23  Yes Unknown, Entered By History   benazepril (LOTENSIN) 40 MG tablet Take 40 mg by mouth daily   Yes Unknown, Entered By History   calcium carbonate (OS-JHONATHAN) 500 MG TABS Take 1 tablet by mouth daily   Yes Unknown, Entered By History   chlorthalidone (HYGROTON) 25 MG tablet Take 1 tablet by mouth daily 6/3/24  Yes Unknown, Entered By History   cholecalciferol ( ULTRA STRENGTH) 50 MCG (2000 UT) CAPS Take 1 capsule by mouth daily   Yes Unknown, Entered By History   metFORMIN (GLUCOPHAGE XR) 500 MG 24 hr tablet Take 500 mg by mouth 2 times daily (with meals) 23  Yes Unknown, Entered By History   methylcellulose (CITRUCEL) 500 MG TABS tablet Take 500 mg by mouth daily   Yes Unknown, Entered By History   simvastatin (ZOCOR) 10 MG tablet Take 1 tablet by mouth every evening 23  Yes Unknown, Entered By History     Echo 2023: Final Impressions:    1. Normal left ventricular size, moderately increased wall thickness, normal global systolic function, calculated EF of 69 %.    2. The aortic valve is trileaflet and sclerotic, mild stenosis and no  regurgitation.    3. The mitral valve is sclerotic. Mean gradient 2.2 mmHg at 80 bpm. Severe eccentric jet of mitral regurgitation.    4. Mild-moderate tricuspid regurgitation.    5. Severely enlarged left atrium.    6. Compared to echo report 6/2023: no significant change.     Chamber Sizes and Function   Normal left ventricular size, moderately increased wall thickness, normal global systolic function, calculated EF of 69 %. Left atrial size is severely enlarged. Left atrial pressure is normal. Right ventricular cavity size is normal, global systolic RV function is normal. RV wall thickness is normal. The right atrium is normal. Right atrial volume index is 25 ml/mÂ . Right atrial area is 15 cmÂ . The pulmonary artery is not well visualized. The sinus of Valsalva is normal sized. The ascending aorta is normal sized.     ECG 6/23/24: Atrial fibrillation with Ventricular-paced rhythm   Abnormal ECG      Anesthesia Evaluation   Pt has had prior anesthetic. Type: General.    No history of anesthetic complications       ROS/MED HX  ENT/Pulmonary:    (-) tobacco use, asthma and sleep apnea   Neurologic:    (-) no seizures, no CVA and migraines   Cardiovascular:     (+) Dyslipidemia hypertension- -   -  - -              pacemaker,          dysrhythmias, a-fib, Irregular Heartbeat/Palpitations, valvular problems/murmurs type: MR       (-) CAD, LEE and murmur   METS/Exercise Tolerance:     Hematologic:     (+)      anemia,       (-) history of blood clots   Musculoskeletal:   (+)     fracture, Fracture location: LLE,      (-) arthritis   GI/Hepatic:    (-) GERD and liver disease   Renal/Genitourinary:     (+) renal disease, type: CRI,         (-) nephrolithiasis   Endo: Comment: hyponatremia    (+)  type II DM,                 (-) Type I DM, thyroid disease and obesity   Psychiatric/Substance Use:     (+) psychiatric history anxiety       Infectious Disease:    (-) Recent Fever   Malignancy:       Other:       "      Physical Exam    Airway        Mallampati: III   TM distance: > 3 FB   Neck ROM: full   Mouth opening: > 3 cm    Respiratory Devices and Support         Dental       (+) Minor Abnormalities - some fillings, tiny chips      Cardiovascular   cardiovascular exam normal       Rhythm and rate: regular and normal (-) no murmur    Pulmonary   pulmonary exam normal        breath sounds clear to auscultation           OUTSIDE LABS:  CBC:   Lab Results   Component Value Date    WBC 9.5 06/24/2024    WBC 7.1 06/23/2024    HGB 8.8 (L) 06/24/2024    HGB 10.1 (L) 06/23/2024    HCT 28.3 (L) 06/24/2024    HCT 31.9 (L) 06/23/2024     06/24/2024     06/23/2024     BMP:   Lab Results   Component Value Date     (L) 06/24/2024     (L) 06/23/2024    POTASSIUM 5.4 (H) 06/24/2024    POTASSIUM 4.4 06/23/2024    CHLORIDE 93 (L) 06/24/2024    CHLORIDE 93 (L) 06/23/2024    CO2 29 06/24/2024    CO2 29 06/23/2024    BUN 44.8 (H) 06/24/2024    BUN 36.7 (H) 06/23/2024    CR 1.10 (H) 06/24/2024    CR 1.07 (H) 06/23/2024     (H) 06/24/2024     (H) 06/24/2024     COAGS:   Lab Results   Component Value Date    PTT 33 06/23/2024    INR 1.01 06/23/2024     POC: No results found for: \"BGM\", \"HCG\", \"HCGS\"  HEPATIC:   Lab Results   Component Value Date    ALBUMIN 4.0 06/23/2024    PROTTOTAL 6.7 06/23/2024    ALT 27 06/23/2024    AST 27 06/23/2024    ALKPHOS 50 06/23/2024    BILITOTAL 0.4 06/23/2024     OTHER:   Lab Results   Component Value Date    JHONATHAN 9.5 06/24/2024       Anesthesia Plan    ASA Status:  3    NPO Status:  NPO Appropriate    Anesthesia Type: General.     - Airway: LMA   Induction: Propofol.   Maintenance: Balanced.        Consents    Anesthesia Plan(s) and associated risks, benefits, and realistic alternatives discussed. Questions answered and patient/representative(s) expressed understanding.     - Discussed:     - Discussed with:  Patient            Postoperative Care    Pain management: " Peripheral nerve block (Single Shot).   PONV prophylaxis: Ondansetron (or other 5HT-3), Dexamethasone or Solumedrol, Background Propofol Infusion     Comments:               Lissette Rodriguez MD    I have reviewed the pertinent notes and labs in the chart from the past 30 days and (re)examined the patient.  Any updates or changes from those notes are reflected in this note.    # Hyperkalemia: Highest K = 5.4 mmol/L in last 2 days, will monitor as appropriate  # Hyponatremia: Lowest Na = 132 mmol/L in last 30 days, will monitor as appropriate        # Drug Induced Coagulation Defect: home medication list includes an anticoagulant medication

## 2024-06-24 NOTE — PROGRESS NOTES
Notified provider about indwelling murray catheter discussed removal or continued need.    Did provider choose to remove indwelling murray catheter? NO    Provider's murray indication for keeping indwelling murray catheter: Indication for continued use: Retention    Is there an order for indwelling murray catheter? YES    *If there is a plan to keep murray catheter in place at discharge daily notification with provider is not necessary, but please add a notation in the treatment team sticky note that the patient will be discharging with the catheter.

## 2024-06-24 NOTE — ANESTHESIA PROCEDURE NOTES
Airway       Patient location during procedure: OR       Procedure Start/Stop Times: 6/24/2024 3:57 PM  Staff -        CRNA: Phil Smart APRN CRNA       Performed By: CRNA  Consent for Airway        Urgency: elective  Indications and Patient Condition       Indications for airway management: alli-procedural       Induction type:intravenous       Mask difficulty assessment: 1 - vent by mask    Final Airway Details       Final airway type: endotracheal airway       Successful airway: ETT - single  Endotracheal Airway Details        ETT size (mm): 7.0       Cuffed: yes       Successful intubation technique: direct laryngoscopy       DL Blade Type: MAC 3       Grade View of Cords: 1       Adjucts: stylet       Bite block used: None    Post intubation assessment        Placement verified by: capnometry, equal breath sounds and chest rise        Number of attempts at approach: 1       Secured with: tape       Ease of procedure: easy       Dentition: Intact and Unchanged    Medication(s) Administered   Medication Administration Time: 6/24/2024 3:57 PM

## 2024-06-24 NOTE — PLAN OF CARE
Goal Outcome Evaluation:       Pt A&Ox4. VSS on 2L O2 via NC. Bedrest. T&R w/ taps system in place. NPO. IV Dilaudid given x1 for pain. BG checks done. KARLA Tiwari in place for retention, patent. Report given to pre-op and pt sent down for surgery with OR staff. Continue plan of care.

## 2024-06-24 NOTE — ANESTHESIA POSTPROCEDURE EVALUATION
Patient: Lisa Meraz    Procedure: Procedure(s):  Open reduction internal fixation of left intertrochanteric fracture with short intramedullary nail       Anesthesia Type:  General    Note:     Postop Pain Control: Uneventful            Sign Out: Well controlled pain   PONV: No   Neuro/Psych: Uneventful            Sign Out: Acceptable/Baseline neuro status   Airway/Respiratory: Uneventful            Sign Out: Acceptable/Baseline resp. status   CV/Hemodynamics: Uneventful            Sign Out: Acceptable CV status; No obvious hypovolemia; No obvious fluid overload   Other NRE: NONE   DID A NON-ROUTINE EVENT OCCUR? No           Last vitals:  Vitals Value Taken Time   /68 06/24/24 1800   Temp 36.4  C (97.6  F) 06/24/24 1715   Pulse 61 06/24/24 1815   Resp 23 06/24/24 1815   SpO2 96 % 06/24/24 1815   Vitals shown include unfiled device data.    Electronically Signed By: Tavia Mackey MD  June 24, 2024  6:16 PM

## 2024-06-24 NOTE — PROGRESS NOTES
[facility]    Medicine Progress Note - Hospitalist Service    Date of Admission:  6/23/2024    Assessment & Plan     Lisa Meraz is a 89 year old female who presents to the emergency room after mechanical fall earlier today.  She was attempting to make her bed when she fell. Denies any loss of consciousness, but does state that she hit her head. Found to have left femur fracture.      Mechanical Fall  CHT  Left IT fracture of proximal femur  - Admit to inpatient  - Currently NPO for procedure 6/24   - Hold DOAC  - IV dilaudid for pain, schedule tylenol   - Bowel regimen  - CTH and CT of cervical spine show no trauma  - Orthopedics consulted, recommendations appreciated, anticipate procedure 6/24     Hyperkalemia  ANDREA versus CKD  Creatinine 1.07 on admission, recheck this AM 1.10. K elevated at 5.4. Per nursing the patient has not urinated since admission and bladder scan revealed retention >500 ml. Patient also has dry mucous membranes. Suspect dehydration and urinary retention contributing to mild hyperkalemia and possible ANDREA. Plan as follows.  - Insert murray catheter  - 500 ml NS bolus  - Recheck K at 1200 after 500 ml bolus given   - Discussed plan with orthopedic team     Anxiety  Apparently anxious last night. Calm and cooperative this morning  - Seroquel 6.25 mg BID prn and 12.5 mg at bedtime prn.  - Avoid anticholinergics     Hypertension  Intermittent atrial fibrillation  Hx of CHF with a PPM  - Denies any chest pain, palpitations or anginal equivalents   - Hold DOAC  - Hold ACE on 6/24 to reduce risk of intra-operative hypotension   - Echo from 6/13/23 and 12/6/23 showed stable severe MR, LVEF of 69%.     DM Type II  Patient is on metformin, she is hyperglycemic with BG > 200  - Mod CHO diet  - Sliding scale insulin  - Hold metformin      Hyperlipidemia  - Continue statin     Constipation  - Bowel regimen            Diet: NPO per Anesthesia Guidelines for Procedure/Surgery  Except for: Meds    DVT Prophylaxis: Pneumatic Compression Devices  Tiwari Catheter: PRESENT, indication:    Lines: None     Cardiac Monitoring: None  Code Status: Full Code      Clinically Significant Risk Factors Present on Admission        # Hyperkalemia: Highest K = 5.4 mmol/L in last 2 days, will monitor as appropriate        # Drug Induced Coagulation Defect: home medication list includes an anticoagulant medication    # Hypertension: Home medication list includes antihypertensive(s)    # Anemia: based on hgb <11                      Disposition Plan     Medically Ready for Discharge: Anticipated Tomorrow         Louis Zuluaga DO  Hospitalist Service  [facility]  Securely message with [employer] (more info)  Text page via [employer] Paging/Directory   ______________________________________________________________________    Interval History     - No acute events overnight  - This morning the patient reports to be doing ok  - Her pain is well controlled  - Updated her children at bedside  - No other acute concerns     Physical Exam   Vital Signs: Temp: 98.1  F (36.7  C) Temp src: Oral BP: 123/61 Pulse: 64   Resp: 16 SpO2: 96 % O2 Device: Nasal cannula Oxygen Delivery: 2 LPM  Weight: 110 lbs 0 oz    Constitutional: awake, alert, cooperative, no apparent distress, and appears stated age  Respiratory: No increased work of breathing, good air exchange, clear to auscultation bilaterally, no crackles or wheezing anteriorly   Cardiovascular: Normal apical impulse, regular rate and rhythm, normal S1 and S2, no S3 or S4, 1-2+ systolic murmur  GI: No scars, normal bowel sounds, soft, non-distended, non-tender, no masses palpated, no hepatosplenomegally  Neurologic: Awake, alert, oriented to name, place and time.  Neuropsychiatric: General: normal, calm, and normal eye contact    Medical Decision Making       55 MINUTES SPENT BY ME on the date of service doing chart review, history, exam, documentation  & further activities per the note.      Data   ------------------------- PAST 24 HR DATA REVIEWED -----------------------------------------------    I have personally reviewed the following data over the past 24 hrs:    9.5  \   8.8 (L)   / 196     132 (L) 93 (L) 44.8 (H) /  176 (H)   5.4 (H) 29 1.10 (H) \     ALT: N/A AST: N/A AP: N/A TBILI: N/A   ALB: N/A TOT PROTEIN: N/A LIPASE: N/A     INR:  N/A PTT:  N/A   D-dimer:  N/A Fibrinogen:  N/A       Imaging results reviewed over the past 24 hrs:   Recent Results (from the past 24 hour(s))   XR Pelvis w Hip Left 1 View    Narrative    EXAM: XR PELVIS AND HIP LEFT 1 VIEW  LOCATION: [facility]  DATE: 6/23/2024    INDICATION: Left hip pain after traumatic injury.  COMPARISON: None.      Impression    IMPRESSION: Acute intertrochanteric fracture of the proximal left femur. Normal left hip joint alignment. Advanced degenerative arthritis in the left hip. No additional pelvic fracture. Right total hip arthroplasty, with normal alignment, without   loosening or other complication. Advanced degenerative changes in the lower lumbar spine and SI joints. Severe atherosclerotic calcification.   XR Chest 1 View    Narrative    EXAM: XR CHEST 1 VIEW  LOCATION: [facility]  DATE: 6/23/2024    INDICATION: Shortness of breath  COMPARISON: None.      Impression    IMPRESSION: Dual-lead left-sided cardiac conduction device. Trace right pleural effusion with adjacent opacities which likely reflect atelectasis. Right apical scarring. Cardiomegaly with central vascular congestion and mild interstitial edema. Mitral   annulus calcifications.   CT Cervical Spine w/o Contrast    Narrative    EXAM: CT HEAD W/O CONTRAST, CT CERVICAL SPINE W/O CONTRAST  LOCATION: [facility]  DATE: 6/23/2024    INDICATION: Fall with head injury.  COMPARISON: None.  TECHNIQUE:   1) Routine CT Head without IV contrast. Multiplanar  reformats. Dose reduction techniques were used.  2) Routine CT Cervical Spine without IV contrast. Multiplanar reformats. Dose reduction techniques were used.    FINDINGS:   HEAD CT:   INTRACRANIAL CONTENTS: No intracranial hemorrhage, extraaxial collection, or mass effect.  No CT evidence of acute infarct. Mild presumed chronic small vessel ischemic changes. Mild generalized volume loss. No hydrocephalus.     VISUALIZED ORBITS/SINUSES/MASTOIDS: Prior left cataract surgery. Visualized portions of the orbits are otherwise unremarkable. No paranasal sinus mucosal disease. No middle ear or mastoid effusion.    BONES/SOFT TISSUES: No scalp hematoma. No skull fracture.    CERVICAL SPINE CT:   VERTEBRA: Unremarkable cervical alignment and preserved vertebral body heights. No fracture or posttraumatic subluxation.     CANAL/FORAMINA: Mild diffuse cervical spondylosis, including mild loss of disc height and minor endplate spurring at C5-6 and C6-7. No CT evidence for high-grade central spinal canal stenosis. Mild neural foraminal stenosis at C5-6.    PARASPINAL: Prevertebral soft tissues within normal limits for thickness. Atherosclerotic calcifications at the carotid bifurcations. Diffusely heterogeneous, nodular thyroid gland. Interlobular septal thickening of the included lung apices, potentially   related to pulmonary edema.      Impression    IMPRESSION:  HEAD CT:  1.  No CT evidence for acute intracranial process.  2.  Brain atrophy and presumed chronic microvascular ischemic changes as above.    CERVICAL SPINE CT:  1.  No CT evidence for acute fracture or post traumatic subluxation.  2.  Mild cervical spondylosis greatest at C5-6.  3.  Interlobular septal thickening at the lung apices. Correlate with any clinical evidence for pulmonary edema.   CT Head w/o Contrast    Narrative    EXAM: CT HEAD W/O CONTRAST, CT CERVICAL SPINE W/O CONTRAST  LOCATION: [facility]  DATE: 6/23/2024    INDICATION:  Fall with head injury.  COMPARISON: None.  TECHNIQUE:   1) Routine CT Head without IV contrast. Multiplanar reformats. Dose reduction techniques were used.  2) Routine CT Cervical Spine without IV contrast. Multiplanar reformats. Dose reduction techniques were used.    FINDINGS:   HEAD CT:   INTRACRANIAL CONTENTS: No intracranial hemorrhage, extraaxial collection, or mass effect.  No CT evidence of acute infarct. Mild presumed chronic small vessel ischemic changes. Mild generalized volume loss. No hydrocephalus.     VISUALIZED ORBITS/SINUSES/MASTOIDS: Prior left cataract surgery. Visualized portions of the orbits are otherwise unremarkable. No paranasal sinus mucosal disease. No middle ear or mastoid effusion.    BONES/SOFT TISSUES: No scalp hematoma. No skull fracture.    CERVICAL SPINE CT:   VERTEBRA: Unremarkable cervical alignment and preserved vertebral body heights. No fracture or posttraumatic subluxation.     CANAL/FORAMINA: Mild diffuse cervical spondylosis, including mild loss of disc height and minor endplate spurring at C5-6 and C6-7. No CT evidence for high-grade central spinal canal stenosis. Mild neural foraminal stenosis at C5-6.    PARASPINAL: Prevertebral soft tissues within normal limits for thickness. Atherosclerotic calcifications at the carotid bifurcations. Diffusely heterogeneous, nodular thyroid gland. Interlobular septal thickening of the included lung apices, potentially   related to pulmonary edema.      Impression    IMPRESSION:  HEAD CT:  1.  No CT evidence for acute intracranial process.  2.  Brain atrophy and presumed chronic microvascular ischemic changes as above.    CERVICAL SPINE CT:  1.  No CT evidence for acute fracture or post traumatic subluxation.  2.  Mild cervical spondylosis greatest at C5-6.  3.  Interlobular septal thickening at the lung apices. Correlate with any clinical evidence for pulmonary edema.

## 2024-06-24 NOTE — ANESTHESIA CARE TRANSFER NOTE
Patient: Lisa Meraz    Procedure: Procedure(s):  Open reduction internal fixation of left intertrochanteric fracture with short intramedullary nail       Diagnosis: Closed nondisplaced intertrochanteric fracture of left femur, initial encounter (H) [S72.145A]  Diagnosis Additional Information: No value filed.    Anesthesia Type:   General     Note:    Oropharynx: oropharynx clear of all foreign objects and spontaneously breathing  Level of Consciousness: awake  Oxygen Supplementation: face mask  Level of Supplemental Oxygen (L/min / FiO2): 8  Independent Airway: airway patency satisfactory and stable  Dentition: dentition unchanged  Vital Signs Stable: post-procedure vital signs reviewed and stable  Report to RN Given: handoff report given  Patient transferred to: PACU    Handoff Report: Identifed the Patient, Identified the Reponsible Provider, Reviewed the pertinent medical history, Discussed the surgical course, Reviewed Intra-OP anesthesia mangement and issues during anesthesia, Set expectations for post-procedure period and Allowed opportunity for questions and acknowledgement of understanding      Vitals:  Vitals Value Taken Time   /79    Temp     Pulse 65 06/24/24 1713   Resp 18 06/24/24 1713   SpO2 98 % 06/24/24 1713   Vitals shown include unfiled device data.    Electronically Signed By: LYN Pereira CRNA  June 24, 2024  5:15 PM

## 2024-06-24 NOTE — PLAN OF CARE
Diagnosis: Fell leading to Left hip intertrochanteric fracture  Mental Status: A&O x4  Activity/dangle: Bedrest  Diet: NPO  Pain: Managed with IV Dilaudid  Tiwari/Voiding: BS at 0225 for 450, BS at 0456 for 472  Tele/Restraints/Iso: N/A  02/LDA: Room air. IV saline locked  D/C Date: TBD  Other Info: BG Check w/ insulin coverage. L Hip ORIF today.

## 2024-06-24 NOTE — PLAN OF CARE
Goal Outcome Evaluation:       Pt arrived from PACU around 1830. Pt A&Ox4. VSS on 2L via NC. Not OOB yet. Tiwari in place for retention. L hip dressing CDI, CMS intact. L PIV infusing LR@10cc/hr. C/o of 5/10 pain, denied intervention. Ortho following, discharge pending medical stability. Continue plan of care.

## 2024-06-24 NOTE — PROVIDER NOTIFICATION
Paged hospiatlist to inform that patient has not voided since being admitted to the floor and bladder scan from 5am was 472.  Do you want to place murray catheter?  Patient has surgery today.     MD response:  Ok to place murray.

## 2024-06-25 ENCOUNTER — APPOINTMENT (OUTPATIENT)
Dept: GENERAL RADIOLOGY | Facility: CLINIC | Age: 89
DRG: 480 | End: 2024-06-25
Attending: STUDENT IN AN ORGANIZED HEALTH CARE EDUCATION/TRAINING PROGRAM
Payer: COMMERCIAL

## 2024-06-25 ENCOUNTER — APPOINTMENT (OUTPATIENT)
Dept: PHYSICAL THERAPY | Facility: CLINIC | Age: 89
DRG: 480 | End: 2024-06-25
Payer: COMMERCIAL

## 2024-06-25 LAB
ANION GAP SERPL CALCULATED.3IONS-SCNC: 10 MMOL/L (ref 7–15)
ATRIAL RATE - MUSE: 375 BPM
BUN SERPL-MCNC: 43.4 MG/DL (ref 8–23)
CALCIUM SERPL-MCNC: 10.2 MG/DL (ref 8.2–9.6)
CHLORIDE SERPL-SCNC: 92 MMOL/L (ref 98–107)
CREAT SERPL-MCNC: 1.17 MG/DL (ref 0.51–0.95)
DEPRECATED HCO3 PLAS-SCNC: 29 MMOL/L (ref 22–29)
DIASTOLIC BLOOD PRESSURE - MUSE: NORMAL MMHG
EGFRCR SERPLBLD CKD-EPI 2021: 44 ML/MIN/1.73M2
ERYTHROCYTE [DISTWIDTH] IN BLOOD BY AUTOMATED COUNT: 15 % (ref 10–15)
GLUCOSE BLDC GLUCOMTR-MCNC: 154 MG/DL (ref 70–99)
GLUCOSE BLDC GLUCOMTR-MCNC: 188 MG/DL (ref 70–99)
GLUCOSE BLDC GLUCOMTR-MCNC: 218 MG/DL (ref 70–99)
GLUCOSE BLDC GLUCOMTR-MCNC: 227 MG/DL (ref 70–99)
GLUCOSE BLDC GLUCOMTR-MCNC: 332 MG/DL (ref 70–99)
GLUCOSE SERPL-MCNC: 182 MG/DL (ref 70–99)
HCT VFR BLD AUTO: 30.6 % (ref 35–47)
HGB BLD-MCNC: 9.6 G/DL (ref 11.7–15.7)
INTERPRETATION ECG - MUSE: NORMAL
MAGNESIUM SERPL-MCNC: 1.5 MG/DL (ref 1.7–2.3)
MCH RBC QN AUTO: 27.5 PG (ref 26.5–33)
MCHC RBC AUTO-ENTMCNC: 31.4 G/DL (ref 31.5–36.5)
MCV RBC AUTO: 88 FL (ref 78–100)
P AXIS - MUSE: NORMAL DEGREES
PLATELET # BLD AUTO: 207 10E3/UL (ref 150–450)
POTASSIUM SERPL-SCNC: 5.1 MMOL/L (ref 3.4–5.3)
PR INTERVAL - MUSE: NORMAL MS
QRS DURATION - MUSE: 138 MS
QT - MUSE: 482 MS
QTC - MUSE: 512 MS
R AXIS - MUSE: -40 DEGREES
RBC # BLD AUTO: 3.49 10E6/UL (ref 3.8–5.2)
SODIUM SERPL-SCNC: 131 MMOL/L (ref 135–145)
SYSTOLIC BLOOD PRESSURE - MUSE: NORMAL MMHG
T AXIS - MUSE: 131 DEGREES
VENTRICULAR RATE- MUSE: 68 BPM
WBC # BLD AUTO: 8.6 10E3/UL (ref 4–11)

## 2024-06-25 PROCEDURE — 93005 ELECTROCARDIOGRAM TRACING: CPT

## 2024-06-25 PROCEDURE — 71045 X-RAY EXAM CHEST 1 VIEW: CPT

## 2024-06-25 PROCEDURE — 250N000013 HC RX MED GY IP 250 OP 250 PS 637

## 2024-06-25 PROCEDURE — 85014 HEMATOCRIT: CPT

## 2024-06-25 PROCEDURE — 250N000011 HC RX IP 250 OP 636

## 2024-06-25 PROCEDURE — 36415 COLL VENOUS BLD VENIPUNCTURE: CPT

## 2024-06-25 PROCEDURE — 120N000001 HC R&B MED SURG/OB

## 2024-06-25 PROCEDURE — 83735 ASSAY OF MAGNESIUM: CPT

## 2024-06-25 PROCEDURE — 250N000011 HC RX IP 250 OP 636: Performed by: STUDENT IN AN ORGANIZED HEALTH CARE EDUCATION/TRAINING PROGRAM

## 2024-06-25 PROCEDURE — 97530 THERAPEUTIC ACTIVITIES: CPT | Mod: GP | Performed by: PHYSICAL THERAPIST

## 2024-06-25 PROCEDURE — 258N000003 HC RX IP 258 OP 636: Mod: JZ | Performed by: STUDENT IN AN ORGANIZED HEALTH CARE EDUCATION/TRAINING PROGRAM

## 2024-06-25 PROCEDURE — 93010 ELECTROCARDIOGRAM REPORT: CPT | Performed by: INTERNAL MEDICINE

## 2024-06-25 PROCEDURE — 99232 SBSQ HOSP IP/OBS MODERATE 35: CPT | Performed by: STUDENT IN AN ORGANIZED HEALTH CARE EDUCATION/TRAINING PROGRAM

## 2024-06-25 PROCEDURE — 97161 PT EVAL LOW COMPLEX 20 MIN: CPT | Mod: GP | Performed by: PHYSICAL THERAPIST

## 2024-06-25 PROCEDURE — 82374 ASSAY BLOOD CARBON DIOXIDE: CPT

## 2024-06-25 PROCEDURE — 999N000111 HC STATISTIC OT IP EVAL DEFER

## 2024-06-25 RX ORDER — MAGNESIUM SULFATE HEPTAHYDRATE 40 MG/ML
2 INJECTION, SOLUTION INTRAVENOUS ONCE
Status: COMPLETED | OUTPATIENT
Start: 2024-06-25 | End: 2024-06-25

## 2024-06-25 RX ORDER — POLYETHYLENE GLYCOL 3350 17 G/17G
17 POWDER, FOR SOLUTION ORAL DAILY
DISCHARGE
Start: 2024-06-25 | End: 2024-07-03

## 2024-06-25 RX ORDER — ACETAMINOPHEN 325 MG/1
975 TABLET ORAL EVERY 8 HOURS PRN
DISCHARGE
Start: 2024-06-25 | End: 2024-08-06

## 2024-06-25 RX ORDER — OXYCODONE HYDROCHLORIDE 5 MG/1
5 TABLET ORAL EVERY 4 HOURS PRN
Qty: 20 TABLET | Refills: 0 | Status: SHIPPED | OUTPATIENT
Start: 2024-06-25 | End: 2024-08-06

## 2024-06-25 RX ORDER — METHOCARBAMOL 500 MG/1
250 TABLET, FILM COATED ORAL 3 TIMES DAILY PRN
DISCHARGE
Start: 2024-06-25 | End: 2024-07-03

## 2024-06-25 RX ORDER — AMOXICILLIN 250 MG
1 CAPSULE ORAL 2 TIMES DAILY PRN
DISCHARGE
Start: 2024-06-25 | End: 2024-08-06

## 2024-06-25 RX ADMIN — OXYCODONE HYDROCHLORIDE 5 MG: 5 TABLET ORAL at 06:35

## 2024-06-25 RX ADMIN — SIMVASTATIN 10 MG: 10 TABLET, FILM COATED ORAL at 20:30

## 2024-06-25 RX ADMIN — SENNOSIDES AND DOCUSATE SODIUM 1 TABLET: 50; 8.6 TABLET ORAL at 20:30

## 2024-06-25 RX ADMIN — POLYETHYLENE GLYCOL 3350 17 G: 17 POWDER, FOR SOLUTION ORAL at 08:12

## 2024-06-25 RX ADMIN — SODIUM CHLORIDE, POTASSIUM CHLORIDE, SODIUM LACTATE AND CALCIUM CHLORIDE 500 ML: 600; 310; 30; 20 INJECTION, SOLUTION INTRAVENOUS at 11:16

## 2024-06-25 RX ADMIN — QUETIAPINE 12.5 MG: 25 TABLET, FILM COATED ORAL at 22:00

## 2024-06-25 RX ADMIN — INSULIN ASPART 1 UNITS: 100 INJECTION, SOLUTION INTRAVENOUS; SUBCUTANEOUS at 22:00

## 2024-06-25 RX ADMIN — APIXABAN 2.5 MG: 2.5 TABLET, FILM COATED ORAL at 08:12

## 2024-06-25 RX ADMIN — ACETAMINOPHEN 975 MG: 325 TABLET, FILM COATED ORAL at 20:30

## 2024-06-25 RX ADMIN — ACETAMINOPHEN 975 MG: 325 TABLET, FILM COATED ORAL at 11:15

## 2024-06-25 RX ADMIN — SENNOSIDES AND DOCUSATE SODIUM 1 TABLET: 50; 8.6 TABLET ORAL at 08:12

## 2024-06-25 RX ADMIN — MAGNESIUM SULFATE HEPTAHYDRATE 2 G: 40 INJECTION, SOLUTION INTRAVENOUS at 13:02

## 2024-06-25 RX ADMIN — CEFAZOLIN 1 G: 1 INJECTION, POWDER, FOR SOLUTION INTRAMUSCULAR; INTRAVENOUS at 08:12

## 2024-06-25 RX ADMIN — APIXABAN 2.5 MG: 2.5 TABLET, FILM COATED ORAL at 20:30

## 2024-06-25 ASSESSMENT — ACTIVITIES OF DAILY LIVING (ADL)
ADLS_ACUITY_SCORE: 41
ADLS_ACUITY_SCORE: 31
ADLS_ACUITY_SCORE: 33
ADLS_ACUITY_SCORE: 41
ADLS_ACUITY_SCORE: 31
ADLS_ACUITY_SCORE: 33
ADLS_ACUITY_SCORE: 41
ADLS_ACUITY_SCORE: 31
ADLS_ACUITY_SCORE: 33
ADLS_ACUITY_SCORE: 31
ADLS_ACUITY_SCORE: 31
ADLS_ACUITY_SCORE: 41
ADLS_ACUITY_SCORE: 31
ADLS_ACUITY_SCORE: 41
ADLS_ACUITY_SCORE: 33
ADLS_ACUITY_SCORE: 31
ADLS_ACUITY_SCORE: 31
ADLS_ACUITY_SCORE: 41
ADLS_ACUITY_SCORE: 31
ADLS_ACUITY_SCORE: 33

## 2024-06-25 NOTE — PROGRESS NOTES
Date/Time 6/25/24 1900  Diagnosis: Fall, L Hip ORIF with IM nail  POD#: 1  Mental Status: A&Ox4, forgetful  Activity/dangle: up with 2 and SS  Diet: CHO  Pain: oxycodone  Tiwari/Voiding: Tiwari for retation  Tele/Restraints/Iso:na  02/LDA: 2L NC desats in RA  D/C Date:TBD  Other Info: CMS intact, dressing CDI

## 2024-06-25 NOTE — PROVIDER NOTIFICATION
Notified Hospitalist  regarding pt's K+ of 5.1 and Mag 1.5. 500cc LR bolus and IV Mag ordered and given, will plan to recheck BMP tomorrow AM per hospitalist. Also Continue plan of care.

## 2024-06-25 NOTE — PLAN OF CARE
Goal Outcome Evaluation:       Pt A&Ox4, forgetful. VSS on 2L O2 via NC. CXR completed. Mod carb, BG checks done. Takes pills whole one at a time. Up 2A w/ sera steady. Up in chair for meals Tiwari in place for retention, to be removed POD#2 per Hospitalist. L hip dressing CDI, CMS intact. Pain controlled w/ scheduled Tylenol & PRN Oxycodone. L PIV SL. Discharge to TCU pending placement, SW consulted. Continue plan of care.

## 2024-06-25 NOTE — PLAN OF CARE
Goal Outcome Evaluation:    Date/Time: 6/24/24 ([id number])    Diagnosis: Fall, L Hip ORIF with IM nail   POD#: 1  Mental Status: A&O x4, forgetful   Activity/dangle: refused dangle  Diet: reg  Pain: oxycodone x1 & scheduled tylenol  Murray/Voiding: murray for retention  Tele/Restraints/Iso: NA  02/LDA: still required 2L NC overnight. PIV SL  D/C Date: TBD?  Other Info: CMS intact. Dressing CDI, iced. Seroquel at HS.

## 2024-06-25 NOTE — CONSULTS
Care Management Initial Consult    General Information  Assessment completed with: Patient,    Type of CM/SW Visit: Initial Assessment    Primary Care Provider verified and updated as needed: Yes   Readmission within the last 30 days:        Reason for Consult: discharge planning  Advance Care Planning:            Communication Assessment  Patient's communication style: spoken language (English or Bilingual)    Hearing Difficulty or Deaf: no   Wear Glasses or Blind: yes    Cognitive  Cognitive/Neuro/Behavioral: WDL  Level of Consciousness: alert     Orientation: oriented x 4             Living Environment:   People in home: alone     Current living Arrangements: independent living facility      Able to return to prior arrangements: yes       Family/Social Support:  Care provided by: self  Provides care for: no one  Marital Status: Single  Children          Description of Support System: Supportive, Involved    Support Assessment: Adequate family and caregiver support, Adequate social supports    Current Resources:   Patient receiving home care services: No     Community Resources: None  Equipment currently used at home: cane, straight, walker, rolling  Supplies currently used at home:      Employment/Financial:  Employment Status: retired        Financial Concerns:             Does the patient's insurance plan have a 3 day qualifying hospital stay waiver?  No    Lifestyle & Psychosocial Needs:  Social Determinants of Health     Food Insecurity: Not on file   Depression: Not at risk (6/3/2024)    Received from [employer]    PHQ-2     PHQ-2 Score: 0   Housing Stability: Not on file   Tobacco Use: Low Risk  (6/3/2024)    Received from [employer]    Patient History     Smoking Tobacco Use: Never     Smokeless Tobacco Use: Never     Passive Exposure: Not on file   Financial Resource Strain: High Risk (1/1/2022)    Received from [facility]    Financial Resource Strain      Difficulty of Paying Living Expenses: Not on file     Difficulty of Paying Living Expenses: Not on file   Alcohol Use: Not At Risk (3/25/2021)    Received from [employer]    AUDIT-C     Frequency of Alcohol Consumption: Never     Average Number of Drinks: Not on file     Frequency of Binge Drinking: Not on file   Transportation Needs: Not on file   Physical Activity: Not on file   Interpersonal Safety: Not on file   Stress: Not on file   Social Connections: Unknown (1/1/2022)    Received from [facility]    Social Connections     Frequency of Communication with Friends and Family: Not on file   Health Literacy: Not on file       Functional Status:  Prior to admission patient needed assistance:              Mental Health Status:  Mental Health Status: No Current Concerns       Chemical Dependency Status:                Values/Beliefs:  Spiritual, Cultural Beliefs, [employer] Practices, Values that affect care:                 Additional Information:  Writer met with patient in her room and introduced self and role.  Patient shared that her  was in [facility] and he was also in [facility] and passed away while at [facility].  Patient is aware of TCU purpose and has been to TCU in the past, she has also had HHC in the past.  Patient lives in independent Sr[facility] Living at [facility].   Writer provided the [facility] TCU list for patient and she will have her daughter make choices with her tomorrow morning.    CTS will continue to follow for safe discharge plan    Keila Hong RN

## 2024-06-25 NOTE — PROVIDER NOTIFICATION
FYI Hospitalist  regarding CXR results being posted.     Addendum 06/25 1505: Informed by hospitalist  that CXR shows no sign of infection and to encourage use of IS.  If requiring O2 by tomorrow will require further workup.

## 2024-06-25 NOTE — PROGRESS NOTES
"   06/25/24 0900   Appointment Info   Signing Clinician's Name / Credentials (PT) Wes Wright DPT   Rehab Comments (PT) WBAT; no precautions       Present no   Living Environment   People in Home alone   Current Living Arrangements independent living facility   Home Accessibility no concerns   Transportation Anticipated family or friend will provide   Living Environment Comments Pt lives alone in an ILF. No stairs. Pt reports family will pick pt upon discharge. Pt reports her sister can provide assist if needed.   Self-Care   Usual Activity Tolerance good   Current Activity Tolerance moderate   Regular Exercise No   Equipment Currently Used at Home walker, rolling   Fall history within last six months yes   Number of times patient has fallen within last six months 1   Activity/Exercise/Self-Care Comment Pt reports being IND at baseline with all ADLs. Pt ambulates w/o an AD within her apartment but uses a FWW for community ambulation.   General Information   Onset of Illness/Injury or Date of Surgery 06/25/24   Referring Physician Reese Lofton PA-C   Patient/Family Therapy Goals Statement (PT) \"To get better\"   Pertinent History of Current Problem (include personal factors and/or comorbidities that impact the POC) s/p Open reduction internal fixation of left intertrochanteric fracture with short intramedullary nail POD#1   Existing Precautions/Restrictions fall   Weight-Bearing Status - LLE weight-bearing as tolerated   Weight-Bearing Status - RLE full weight-bearing   Cognition   Orientation Status (Cognition) oriented x 3   Pain Assessment   Patient Currently in Pain Yes, see Vital Sign flowsheet  (3/10 at surgical site)   Integumentary/Edema   Integumentary/Edema Comments Incision on L hip with dressing intact   Posture    Posture Forward head position;Protracted shoulders   Range of Motion (ROM)   Range of Motion ROM is WFL;ROM deficits secondary to surgical procedure;ROM deficits " secondary to pain;ROM deficits secondary to weakness;ROM deficits secondary to swelling   ROM Comment RLE ROM WFL; LLE limited by surgical procedure   Strength (Manual Muscle Testing)   Strength (Manual Muscle Testing) Deficits observed during functional mobility   Strength Comments LLE Hip Flexion: 2/5; RLE Hip Flexion: 3+/5   Bed Mobility   Comment, (Bed Mobility) Supine>sit w/ mod A x 1   Transfers   Comment, (Transfers) Sit>stand w/ FWW and mod A x 1   Gait/Stairs (Locomotion)   Comment, (Gait/Stairs) Unable to initiate   Balance   Balance Comments Adequate static sitting balance; pt unsteady with OOB activity   Sensory Examination   Sensory Perception patient reports no sensory changes   Clinical Impression   Criteria for Skilled Therapeutic Intervention Yes, treatment indicated   PT Diagnosis (PT) Impaired gait   Influenced by the following impairments Decreased activity tolerance; decreased balance; decreased strength   Functional limitations due to impairments Impaired functional mobility   Clinical Presentation (PT Evaluation Complexity) stable   Clinical Presentation Rationale Clinical judgement   Clinical Decision Making (Complexity) low complexity   Planned Therapy Interventions (PT) balance training;bed mobility training;gait training;patient/family education;strengthening;transfer training;progressive activity/exercise   Risk & Benefits of therapy have been explained evaluation/treatment results reviewed;care plan/treatment goals reviewed;risks/benefits reviewed;current/potential barriers reviewed;participants voiced agreement with care plan;participants included;patient   PT Total Evaluation Time   PT Eval, Low Complexity Minutes (29399) 10   Physical Therapy Goals   PT Frequency 5x/week   PT Predicted Duration/Target Date for Goal Attainment 06/30/24   PT Goals Bed Mobility;Transfers;Gait   PT: Bed Mobility Supervision/stand-by assist;Supine to/from sit   PT: Transfers Supervision/stand-by assist;Sit  to/from stand;Assistive device   PT: Gait Supervision/stand-by assist;Assistive device;100 feet   Interventions   Interventions Quick Adds Therapeutic Activity   Therapeutic Activity   Therapeutic Activities: dynamic activities to improve functional performance Minutes (77321) 25   Symptoms Noted During/After Treatment Dizziness;Fatigue;Increased pain   Treatment Detail/Skilled Intervention Greeted pt supine in bed, agreed to PT. VSS on RA throughout session. PT educated pt on WBAT status and having no precautions, pt voiced understanding. Pt performed supine>sit w/ mod A x1, needing assist to safely lift LLE off of bed and trunk control. Pt unable to volitionally move LLE against gravity at this time. Once in sitting, pt able to scoot self to EOB with increased effort and sit unsupported without LOB. Pt reporting dizziness in sitting, BP taken, WNL. Pt performed sit>stand x 3 w/ FWW and mod A x 1, needing assist to stand fully upright. Verbal cues for hand placement. Pt unsteady in standing, presenting with flexed knee on LLE and L lean. Cues for knee extension and to  midline, unable to complete citing pain and weakness. Pt able to stand for ~30 seconds before needing to sit. Pt asked to cease session. Pt returned to supine w/ mod A x1, needing assist to safely lift LLE back into bed and reposition. Pt ended session supine in bed, with all needs met and call light within reach.   PT Discharge Planning   PT Plan Bed mobility; repeat sit>stands; pre-gait exercises; gait w/ FWW and WC follow   PT Discharge Recommendation (DC Rec) Transitional Care Facility   PT Rationale for DC Rec Pt is below baseline. Pt currently requiring assist with all functional mobility. Pt presents with deficits in activity tolerance, balance, and strength. Due to these deficits, pt is a falls risk and currently unable to ambulate. Pt would benefit from continued skilled PT services via TCU to address deficits and improve IND with  safety and functional mobility.   PT Brief overview of current status Supine>sit w/ mod A x1; sit>stand w/ FWW and mod A x1   Total Session Time   Timed Code Treatment Minutes 25   Total Session Time (sum of timed and untimed services) 35

## 2024-06-25 NOTE — PROVIDER NOTIFICATION
Paged Hospitalist  regarding unable to wean off O2 and BG of 332 for lunch. Mod carb diet ordered and Portable CXR ordered and awaiting to be completed. Continue plan of care.

## 2024-06-25 NOTE — PROGRESS NOTES
[facility]    Medicine Progress Note - Hospitalist Service    Date of Admission:  6/23/2024    Assessment & Plan     Lisa Meraz is a 89 year old female who presents to the emergency room after mechanical fall earlier today. She was attempting to make her bed when she fell. Denies any loss of consciousness, but does state that she hit her head. Found to have left femur fracture. CTH and CT of cervical spine showed no trauma.     Mechanical Fall  CHT  Left IT fracture of proximal femur s/p ORIF left intertrochanteric fracture with short intramedullary nail 6/24/24  - IV dilaudid for pain, schedule tylenol   - Bowel regimen  - Orthopedics consulted, recommendations appreciate  - Pain control and VTE prophylaxis per orthopedic surgery, now back on PTA Apixiban 2.5 mg bid  - PT/OT, likely TCU placement     Hyperkalemia, resolved  Hyponatremia  ANDREA versus CKD stage IV  Creatinine 1.07 on admission, currently 1.17. Suspected ANDREA in setting of dehydration/NPO status versus baseline CKD. Patient also hyponatremic to 131. K 5.4 on 6/24, currently 5.1. Patient also had urinary retention on admission.   - Daily BMP  - Hold PTA ACE, she has been having recent hyperkalemia, wondering if risk of this medication now outweighs the benefit     - Tiwari catheter placed 6/24/24, plan for trial void on 6/26/24     Post-operative Hypoxia  Patient currently on 2 lpm nasal cannula. Unable to wean off today per nursing. No cough or respiratory symptoms reported by patient. No fevers or chills. Low suspicion for infection at this time.   - Incentive spirometry  - Chest XR ordered    Anxiety  Apparently anxious on admission. Calm and cooperative this morning. She reports prn medication for sleep (seroquel) has been helping a lot.   - Seroquel 6.25 mg BID prn and 12.5 mg at bedtime prn.  - Avoid anticholinergics     Hypertension  Intermittent atrial fibrillation  History of paroxysmal complete heart block s/p  dual chamber permament pacemaker 5/10/21  H/o of Severe Mitral regurgitation  Denies any chest pain, palpitations or anginal equivalents on admission. She has a history of nonobstructive carotid artery disease, pulmonary hypertension, mild aortic valve stenosis, severe mitral valve regurgitation with a flail P2 leaflet. She [facility] with Dr. Kenney at [facility]. She is due for outpatient cardiology follow up and repeat TTE which is being checked every 6 months. EKG with ventricularly paced rhythm.   - Now back on DOAC s/p surgery   - Currently hold ace inhibitor given hyperkalemia  - Plan for outpatient cardiology follow up     DM Type II  Patient is on metformin, she is hyperglycemic with BG > 200  - Mod CHO diet  - Sliding scale insulin  - Hold PTA metformin      Hyperlipidemia  - Continue statin     Constipation  - Bowel regimen    Normocytic anemia  Hemoglobin on admission 10.1, currently 9.6.   - Continue to monitor           Diet: Moderate Consistent Carb (60 g CHO per Meal) Diet      DVT Prophylaxis: Pneumatic Compression Devices  Tiwari Catheter: PRESENT, indication: Acute retention or obstruction  Lines: None     Cardiac Monitoring: None  Code Status: Full Code      Clinically Significant Risk Factors        # Hyperkalemia: Highest K = 5.4 mmol/L in last 2 days, will monitor as appropriate    # Hypercalcemia: Highest Ca = 10.2 mg/dL in last 2 days, will monitor as appropriate  # Hypomagnesemia: Lowest Mg = 1.5 mg/dL in last 2 days, will replace as needed                                 Disposition Plan     Medically Ready for Discharge: Anticipated Tomorrow         Louis Zuluaga DO  Hospitalist Service  [facility]  Securely message with Contrib (more info)  Text page via [employer] Paging/Directory   ______________________________________________________________________    Interval History     - No acute events overnight  - Patient had a good night sleep last  night  - She is not having significant pain today   - Was able to stand at the bedside with PT  - Updated her family at bedside  - No other acute concerns     Physical Exam   Vital Signs: Temp: 99.1  F (37.3  C) Temp src: Oral BP: 123/54 Pulse: 62   Resp: 16 SpO2: 95 % O2 Device: Nasal cannula Oxygen Delivery: 2 LPM  Weight: 110 lbs 0 oz    Constitutional: awake, alert, cooperative, no apparent distress, and appears stated age  Respiratory: No increased work of breathing, good air exchange, clear to auscultation bilaterally, no crackles or wheezing anteriorly   Cardiovascular: Normal apical impulse, regular rate and rhythm, normal S1 and S2, no S3 or S4, 1-2+ systolic murmur  GI: No scars, normal bowel sounds, soft, non-distended, non-tender, no masses palpated, no hepatosplenomegally  Neurologic: Awake, alert, oriented to name, place and time.  Neuropsychiatric: General: normal, calm, and normal eye contact    Medical Decision Making       48 MINUTES SPENT BY ME on the date of service doing chart review, history, exam, documentation & further activities per the note.      Data   ------------------------- PAST 24 HR DATA REVIEWED -----------------------------------------------    I have personally reviewed the following data over the past 24 hrs:    8.6  \   9.6 (L)   / 207     131 (L) 92 (L) 43.4 (H) /  332 (H)   5.1 29 1.17 (H) \       Imaging results reviewed over the past 24 hrs:   Recent Results (from the past 24 hour(s))   XR Surgery SUGEY L/T 5 Min Fluoro w Stills    Narrative    EXAM: XR SURGERY SUGEY FLUORO LESS THAN 5 MIN W STILLS  LOCATION: [facility]  DATE: 6/24/2024    INDICATION: ORIF of left Intertrochanteric FX with short Intramedullary nail   fluoro 0.9 sec  COMPARISON: None.    TECHNIQUE: Intraoperative fluoroscopy performed during the patient's procedure.    RADIATION DOSE: Total Air Kerma 7.35 mGy    FINDINGS: There are 4 intraoperative images which show fixation of a  femoral fracture in progress. Please see dedicated operative report for further details.     Fluoroscopy dose is 7.35 mGy.    XR Chest Port 1 View    Narrative    CHEST ONE VIEW  6/25/2024 2:09 PM     HISTORY: hypoxia    COMPARISON: Chest radiograph 6/23/2024.      Impression    IMPRESSION: Increased right mid and lower lung hazy opacity likely  reflecting layering small-to-moderate pleural effusion, atelectasis  and/or infection. Possible trace left pleural effusion. No  pneumothorax. Similar cardiomediastinal silhouette and dense mitral  valve annular calcification. Left chest wall cardiac device.    TANVIR HURLEY MD         SYSTEM ID:  [id number]

## 2024-06-25 NOTE — PROGRESS NOTES
"Orthopedic Surgery  Lisa Meraz  06/25/2024     Admit Date:  6/23/2024    POD: 1 Day Post-Op   Procedure(s):  Open reduction internal fixation of left intertrochanteric fracture with short intramedullary nail    Patient resting comfortably in bed.   States she feels \"pretty good\" this morning.   Pain controlled to the left hip, but does not an area of tenderness to the proximal hip. Pain is improved when she lifts the area from the bed. Denies any low back pain.  Denies numbness, tingling, and spasms to the LLE.  Tolerating oral intake.    Denies nausea or vomiting.  Denies chest pain or shortness of breath.  No acute events overnight.    Temp:  [97.6  F (36.4  C)-99.1  F (37.3  C)] 99.1  F (37.3  C)  Pulse:  [60-78] 62  Resp:  [9-29] 16  BP: (112-150)/(54-87) 123/54  SpO2:  [88 %-100 %] 94 %    Alert and oriented. Intermittently forgetful. NAD. Non-toxic appearing. Supplemental O2 intact, but no respiratory distress.  Left hip soft dressing is clean, dry, and intact.   No erythema of surrounding skin.  Mild swelling to the left lateral hip and buttock.   Thigh remains soft and compressible throughout.  Bilateral calves are soft, non-tender.  Left lower extremity is NVI.  Patient able to resist ankle dorsiflexion and plantar flexion bilaterally.  Able to flex and extend the toes.  DP pulse palpable.  Sensation intact bilateral lower extremities.    Labs/Imaging:  Recent Labs   Lab Test 06/25/24  0657 06/24/24  0733 06/23/24  1046   WBC 8.6 9.5 7.1   HGB 9.6* 8.8* 10.1*    196 192     Recent Labs   Lab Test 06/23/24  1046   INR 1.01     A/P    1. S/p left intertrochanteric femur fracture ORIF using CM device (DOS: 6/24/24)  -Continue PTA Eliquis 2.5 mg BID for DVT prophylaxis.    -Periop Ancef completed.  -Mobilize with PT/OT.  -WBAT LLE with walker.    -Continue current pain regimen.  -Dressings: Keep current soft dressing intact. Okay for RN to reinforce or change if needed. Order placed for RN to " convert dressing to Xerform vs Adaptic, gauze, and tegaderm on POD#2. This dressing can remain in place thereafter, but okay for RN to change if saturated >60% or peeling.  -Follow-up: 2 weeks post-op with Dr. Richy Dunbar/Reese Lofton PA-C    2. Disposition  -Anticipate d/c likely to TCU when medically cleared and progressing in PT.    Lexis Mojica PA-C  [facility]    Attending addendum: I evaluated the patient on the floor independently.  She is doing well, pain is controlled.  Did not mobilize very much with therapy.  Having pain in the hip and thigh region.  Denies any chest pain or shortness of breath.  Exam reveals that the dressing is clean dry and intact.  She is neurovascular intact.  Thigh is soft.  Hemoglobin is 9.6, no indication for transfusion.  Further plan as above    Nils Dunbar MD

## 2024-06-25 NOTE — PROGRESS NOTES
Notified provider about indwelling murray catheter discussed removal or continued need.    Did provider choose to remove indwelling murray catheter? NO, Keep murray in until tomorrow POD #2 per Hospitalist     Provider's murray indication for keeping indwelling murray catheter: Indication for continued use: Retention    Is there an order for indwelling murray catheter? YES    *If there is a plan to keep murray catheter in place at discharge daily notification with provider is not necessary, but please add a notation in the treatment team sticky note that the patient will be discharging with the catheter.

## 2024-06-25 NOTE — PLAN OF CARE
OT: Order received, chart reviewed and discussed with care team. Per PT, patient would benefit from continued therapy at TCU setting due to current level of assist. Defer discharge recommendations to PT and care team. Will complete OT orders and defer to next level of care.

## 2024-06-26 ENCOUNTER — APPOINTMENT (OUTPATIENT)
Dept: PHYSICAL THERAPY | Facility: CLINIC | Age: 89
DRG: 480 | End: 2024-06-26
Payer: COMMERCIAL

## 2024-06-26 LAB
ANION GAP SERPL CALCULATED.3IONS-SCNC: 8 MMOL/L (ref 7–15)
BUN SERPL-MCNC: 40.3 MG/DL (ref 8–23)
CALCIUM SERPL-MCNC: 8.9 MG/DL (ref 8.2–9.6)
CHLORIDE SERPL-SCNC: 93 MMOL/L (ref 98–107)
CREAT SERPL-MCNC: 1.2 MG/DL (ref 0.51–0.95)
DEPRECATED HCO3 PLAS-SCNC: 29 MMOL/L (ref 22–29)
EGFRCR SERPLBLD CKD-EPI 2021: 43 ML/MIN/1.73M2
GLUCOSE BLDC GLUCOMTR-MCNC: 161 MG/DL (ref 70–99)
GLUCOSE BLDC GLUCOMTR-MCNC: 189 MG/DL (ref 70–99)
GLUCOSE BLDC GLUCOMTR-MCNC: 219 MG/DL (ref 70–99)
GLUCOSE BLDC GLUCOMTR-MCNC: 230 MG/DL (ref 70–99)
GLUCOSE BLDC GLUCOMTR-MCNC: 384 MG/DL (ref 70–99)
GLUCOSE SERPL-MCNC: 179 MG/DL (ref 70–99)
HGB BLD-MCNC: 9.3 G/DL (ref 11.7–15.7)
OSMOLALITY SERPL: 294 MMOL/KG (ref 280–301)
OSMOLALITY UR: 512 MMOL/KG (ref 100–1200)
POTASSIUM SERPL-SCNC: 4.6 MMOL/L (ref 3.4–5.3)
SODIUM SERPL-SCNC: 130 MMOL/L (ref 135–145)
SODIUM UR-SCNC: <20 MMOL/L

## 2024-06-26 PROCEDURE — 120N000001 HC R&B MED SURG/OB

## 2024-06-26 PROCEDURE — 83930 ASSAY OF BLOOD OSMOLALITY: CPT | Performed by: STUDENT IN AN ORGANIZED HEALTH CARE EDUCATION/TRAINING PROGRAM

## 2024-06-26 PROCEDURE — 36415 COLL VENOUS BLD VENIPUNCTURE: CPT | Performed by: STUDENT IN AN ORGANIZED HEALTH CARE EDUCATION/TRAINING PROGRAM

## 2024-06-26 PROCEDURE — 85018 HEMOGLOBIN: CPT

## 2024-06-26 PROCEDURE — 250N000013 HC RX MED GY IP 250 OP 250 PS 637

## 2024-06-26 PROCEDURE — 250N000011 HC RX IP 250 OP 636: Mod: JZ | Performed by: STUDENT IN AN ORGANIZED HEALTH CARE EDUCATION/TRAINING PROGRAM

## 2024-06-26 PROCEDURE — 83935 ASSAY OF URINE OSMOLALITY: CPT | Performed by: STUDENT IN AN ORGANIZED HEALTH CARE EDUCATION/TRAINING PROGRAM

## 2024-06-26 PROCEDURE — 80048 BASIC METABOLIC PNL TOTAL CA: CPT | Performed by: STUDENT IN AN ORGANIZED HEALTH CARE EDUCATION/TRAINING PROGRAM

## 2024-06-26 PROCEDURE — 250N000013 HC RX MED GY IP 250 OP 250 PS 637: Performed by: STUDENT IN AN ORGANIZED HEALTH CARE EDUCATION/TRAINING PROGRAM

## 2024-06-26 PROCEDURE — 99232 SBSQ HOSP IP/OBS MODERATE 35: CPT | Performed by: STUDENT IN AN ORGANIZED HEALTH CARE EDUCATION/TRAINING PROGRAM

## 2024-06-26 PROCEDURE — 84300 ASSAY OF URINE SODIUM: CPT | Performed by: STUDENT IN AN ORGANIZED HEALTH CARE EDUCATION/TRAINING PROGRAM

## 2024-06-26 RX ORDER — FUROSEMIDE 10 MG/ML
20 INJECTION INTRAMUSCULAR; INTRAVENOUS ONCE
Status: COMPLETED | OUTPATIENT
Start: 2024-06-26 | End: 2024-06-26

## 2024-06-26 RX ORDER — METFORMIN HCL 500 MG
500 TABLET, EXTENDED RELEASE 24 HR ORAL 2 TIMES DAILY WITH MEALS
Status: DISCONTINUED | OUTPATIENT
Start: 2024-06-26 | End: 2024-06-28

## 2024-06-26 RX ORDER — MULTIPLE VITAMINS W/ MINERALS TAB 9MG-400MCG
1 TAB ORAL DAILY
Status: DISCONTINUED | OUTPATIENT
Start: 2024-06-26 | End: 2024-07-05 | Stop reason: HOSPADM

## 2024-06-26 RX ADMIN — OXYCODONE HYDROCHLORIDE 5 MG: 5 TABLET ORAL at 05:43

## 2024-06-26 RX ADMIN — SENNOSIDES AND DOCUSATE SODIUM 1 TABLET: 50; 8.6 TABLET ORAL at 20:24

## 2024-06-26 RX ADMIN — INSULIN ASPART 1 UNITS: 100 INJECTION, SOLUTION INTRAVENOUS; SUBCUTANEOUS at 21:47

## 2024-06-26 RX ADMIN — APIXABAN 2.5 MG: 2.5 TABLET, FILM COATED ORAL at 08:16

## 2024-06-26 RX ADMIN — POLYETHYLENE GLYCOL 3350 17 G: 17 POWDER, FOR SOLUTION ORAL at 08:16

## 2024-06-26 RX ADMIN — SENNOSIDES AND DOCUSATE SODIUM 1 TABLET: 50; 8.6 TABLET ORAL at 08:16

## 2024-06-26 RX ADMIN — OXYCODONE HYDROCHLORIDE 5 MG: 5 TABLET ORAL at 13:59

## 2024-06-26 RX ADMIN — ACETAMINOPHEN 975 MG: 325 TABLET, FILM COATED ORAL at 20:24

## 2024-06-26 RX ADMIN — FUROSEMIDE 20 MG: 10 INJECTION, SOLUTION INTRAMUSCULAR; INTRAVENOUS at 17:47

## 2024-06-26 RX ADMIN — ACETAMINOPHEN 975 MG: 325 TABLET, FILM COATED ORAL at 13:11

## 2024-06-26 RX ADMIN — SIMVASTATIN 10 MG: 10 TABLET, FILM COATED ORAL at 20:23

## 2024-06-26 RX ADMIN — APIXABAN 2.5 MG: 2.5 TABLET, FILM COATED ORAL at 20:24

## 2024-06-26 RX ADMIN — OXYCODONE HYDROCHLORIDE 5 MG: 5 TABLET ORAL at 00:16

## 2024-06-26 RX ADMIN — Medication 1 TABLET: at 13:11

## 2024-06-26 ASSESSMENT — ACTIVITIES OF DAILY LIVING (ADL)
ADLS_ACUITY_SCORE: 41

## 2024-06-26 NOTE — PROGRESS NOTES
[facility]    Medicine Progress Note - Hospitalist Service    Date of Admission:  6/23/2024    Assessment & Plan     Lisa Meraz is a 89 year old female who presents to the emergency room after mechanical fall earlier today. She was attempting to make her bed when she fell. Denies any loss of consciousness, but does state that she hit her head. Found to have left femur fracture. CTH and CT of cervical spine showed no trauma. Anticipate medically stable for discharge in 1-2 days.      Mechanical Fall  CHT  Left IT fracture of proximal femur s/p ORIF left intertrochanteric fracture with short intramedullary nail 6/24/24  - IV dilaudid for pain, schedule tylenol   - Bowel regimen, no BM as of 6/25  - Orthopedics consulted, recommendations appreciated  - Pain control and VTE prophylaxis per orthopedic surgery, now back on PTA Apixiban 2.5 mg bid  - PT/OT, likely TCU placement     Hyperkalemia, resolved  Hyponatremia  ANDREA versus CKD stage IV  Cachexia   Creatinine trend since admission 1.07 > 1.10 > 1.17 > 1.20. Na trend 133 > 132 > 131 > 130. Initially suspected ANDREA and hyponatremia in setting of dehydration/NPO status versus baseline CKD. Hyperkalemic on admission which resolved with murray catheter placement, IV fluids, and holding PTA ACE. Patient is frail and appears malnourished. Has been intermittently hyperglycemic since admission but currently do not suspect pseudohyponatremia.   - Daily BMP  - Urine sodium, urine osm, serum osm   - Hold PTA ACE, she has been having recent hyperkalemia, wondering if risk of this medication now outweighs the benefit     - Murray catheter placed 6/24/24, trial void today 6/26/24   - Nutrition consult placed 6/26    Post-operative Hypoxia  Patient currently on 2 lpm nasal cannula. Unable to wean off today per nursing. Chest XR showed right lower lobe infiltrate, versus effusion, versus atelectasis.  Patient denies infectious symptoms and does not feel   short of breath. Has not walked much since surgery. Is on PTA DOAC. No tachycardic.  - Continue Incentive spirometry  - Start continuous oximetry and attempt to wean O2 6/26. If unable to do so will consider further work up including advanced imaging    Anxiety  Apparently anxious on admission. Calm and cooperative this morning. She reports prn medication for sleep (seroquel) has been helping a lot.   - Seroquel 6.25 mg BID prn and 12.5 mg at bedtime prn.  - Avoid anticholinergics     Hypertension  Intermittent atrial fibrillation  History of paroxysmal complete heart block s/p dual chamber permament pacemaker 5/10/21  H/o of Severe Mitral regurgitation  Denies any chest pain, palpitations or anginal equivalents on admission. She has a history of nonobstructive carotid artery disease, pulmonary hypertension, mild aortic valve stenosis, severe mitral valve regurgitation with a flail P2 leaflet. She follows with Dr. Kenney at [facility]. She is due for outpatient cardiology follow up and repeat TTE which is being checked every 6 months. EKG with ventricularly paced rhythm.   - Now back on DOAC s/p surgery   - Currently hold ace inhibitor given hyperkalemia and possible ANDREA  - Plan for outpatient cardiology follow up     DM Type II  Patient is on metformin  - Mod CHO diet  - Sliding scale insulin  - Hold PTA metformin      Hyperlipidemia  - Continue statin     Constipation  - Bowel regimen    Normocytic anemia  Hemoglobin on admission 10.1, currently 9.3   - Continue to monitor           Diet: Moderate Consistent Carb (60 g CHO per Meal) Diet      DVT Prophylaxis: Pneumatic Compression Devices  Tiwari Catheter: Not present  Lines: None     Cardiac Monitoring: None  Code Status: Full Code      Clinically Significant Risk Factors           # Hypercalcemia: Highest Ca = 10.2 mg/dL in last 2 days, will monitor as appropriate  # Hypomagnesemia: Lowest Mg = 1.5 mg/dL in last 2 days, will replace as needed                                  Disposition Plan     Medically Ready for Discharge: Anticipated Tomorrow         Louis Zuluaga DO  Hospitalist Service  [facility]  Securely message with MOGL (more info)  Text page via [employer] Paging/Directory   ______________________________________________________________________    Interval History     - No acute events overnight  - Patient did not sleep great last night  - Denies pain today  - States she stood up and sat in chair yesterday  - Denies shortness of breath, cough, fever or any other symptoms     Physical Exam   Vital Signs: Temp: 97.8  F (36.6  C) Temp src: Oral BP: 137/70 Pulse: 82   Resp: 16 SpO2: 94 % O2 Device: Nasal cannula Oxygen Delivery: 2 LPM  Weight: 106 lbs 4.19 oz    Constitutional: awake, alert, cooperative, no apparent distress, and appears stated age  Respiratory: No increased work of breathing, good air exchange, decreased breath sounds at right base posteriorly  Cardiovascular: Normal apical impulse, regular rate and rhythm, normal S1 and S2, no S3 or S4, 1-2+ systolic murmur  GI: No scars, normal bowel sounds, soft, non-distended, non-tender, no masses palpated, no hepatosplenomegally  Neurologic: Awake, alert, oriented to name, place and time.  Neuropsychiatric: General: normal, calm, and normal eye contact    Medical Decision Making       43 MINUTES SPENT BY ME on the date of service doing chart review, history, exam, documentation & further activities per the note.      Data   ------------------------- PAST 24 HR DATA REVIEWED -----------------------------------------------    I have personally reviewed the following data over the past 24 hrs:    N/A  \   9.3 (L)   / N/A     130 (L) 93 (L) 40.3 (H) /  161 (H)   4.6 29 1.20 (H) \       Imaging results reviewed over the past 24 hrs:   Recent Results (from the past 24 hour(s))   XR Chest Port 1 View    Narrative    CHEST ONE VIEW  6/25/2024 2:09 PM     HISTORY:  hypoxia    COMPARISON: Chest radiograph 6/23/2024.      Impression    IMPRESSION: Increased right mid and lower lung hazy opacity likely  reflecting layering small-to-moderate pleural effusion, atelectasis  and/or infection. Possible trace left pleural effusion. No  pneumothorax. Similar cardiomediastinal silhouette and dense mitral  valve annular calcification. Left chest wall cardiac device.    TANVIR HURLEY MD         SYSTEM ID:  [id number]

## 2024-06-26 NOTE — PROVIDER NOTIFICATION
MD notified: Bladder scanned for 293, do you want me to place a catheter or straight cat. for the sample?  Also she is still desating to 83-88% in RA but sating 91-96 on 1L.

## 2024-06-26 NOTE — PROGRESS NOTES
Date/Time 6/26/24  19:00  Diagnosis: Fall, L Hip ORIF with IM nail  POD#: 2  Mental Status: A&Ox4, forgetful  Activity/dangle: up with 2 and SS  Diet: CHO  Pain: oxycodone, scheduled tylenol  Tiwari/Voiding: BR/BSC  Tele/Restraints/Iso:na  02/LDA: 1L NC desats in RA  D/C Date:TBD  Other Info: CMS intact, dressing CDI, BG was 384 at 1600 MD aware, turn repositioned, up in a chair. IS encouraged.

## 2024-06-26 NOTE — PLAN OF CARE
Diagnosis: Left hip ORIF with IM nail  POD#: 2  Mental Status: A&Ox4, forgetful  Activity/dangle up 2 inga steady  Diet: mod carb  Pain: oxycodone, scheduled tylenol  Tiwari/Voiding: DTV voiding trial  02/LDA: 2L NC, Iv SL  D/C Date: pending TCU  Other Info: Dressing scant drainage, CMS intact, blood sugar checks

## 2024-06-26 NOTE — PROGRESS NOTES
Care Management Follow Up    Length of Stay (days): 3    Expected Discharge Date: 06/27/2024     Concerns to be Addressed:       Patient plan of care discussed at interdisciplinary rounds: Yes    Anticipated Discharge Disposition: Transitional Care     Anticipated Discharge Services:    Anticipated Discharge DME: [facility]    Patient/family educated on Medicare website which has current facility and service quality ratings: yes  Education Provided on the Discharge Plan:    Patient/Family in Agreement with the Plan: yes    Referrals Placed by CM/SW:    Private pay costs discussed: Not applicable    Additional Information:  Writer spoke with patient regarding choices for TCU referral. Patient has [facility] so there are limited facilities that will take the insurance. Writer explained process of prior authorization to patient and family. Patient and family decided on 4 facilities.  to follow up tomorrow after facilities have gotten back to . Patient is planning to discharge Friday.     [facility]  Care Management

## 2024-06-26 NOTE — CONSULTS
"CLINICAL NUTRITION SERVICES  -  ASSESSMENT NOTE    Recommendations Ordered by Registered Dietitian (RD):   Ordered chocolate Glucerna at breakfast  Ordered MVI/M   Malnutrition:   % Weight Loss:  None noted  % Intake:  </= 50% for >/= 1 month (severe malnutrition) - chronic  Subcutaneous Fat Loss:  Orbital region severe depletion and Upper arm region severe depletion  Muscle Loss:  Temporal region moderate-severe depletion, Clavicle bone region severe depletion, and Dorsal hand region severe depletion  Fluid Retention:  2+ Mild    Malnutrition Diagnosis: Severe malnutrition  In Context of:  Environmental or social circumstances     REASON FOR ASSESSMENT  Lisa Meraz is a 89 year old female seen by Registered Dietitian for Provider Order - \"malnutrition assessment\"    PMH of CHF, T2DM, HLD. Presents after a fall.    NUTRITION HISTORY    - Spoke with patient and patients daughter at bedside. Per patient, she has a low appetite at baseline. She said she normally eats small, frequent meals. In the past year daughter noted how pt has been eating even smaller portions. Patient thinks she may be losing her appetite mostly due to age.  - Per pt, her MD told her to start drinking Glucerna's in an attempt to gain weight. She said she has been drinking 2 of them per day and has noticed some wt gain. Her dtr said she was down to 99# at one point and now she is in the 105-110# range. She has also been trying to add in an extra snack throughout the day. She has a bedtime snack of sugar free cookies before bed every night.     CURRENT NUTRITION ORDERS  Diet Order: Moderate Consistent Carbohydrate     Current Intake/Tolerance:  - 50-75% intakes documented per nursing flowsheets  - Per healthtouch:  6/25: 3 meals ordered  6/24: 1 meal ordered  6/23: 2 meals ordered    NUTRITION FOCUSED PHYSICAL ASSESSMENT FOR DIAGNOSING MALNUTRITION)  Yes            Observed:    Muscle wasting (refer to documentation in Malnutrition section) " "and Subcutaneous fat loss (refer to documentation in Malnutrition section)    ANTHROPOMETRICS  Height: 5'3\" per care everywhere  Weight: 48.2 kg, 106 lbs 4.19 oz  BMI 18.8   Weight Status:  Normal BMI however borderline underweight  IBW: 52.3 kg  % IBW: 92%  Weight History: wt gain recently, see discussion above  Wt Readings from Last 10 Encounters:   06/26/24 48.2 kg (106 lb 4.2 oz)     LABS  Na 130 (L), BUN 40.3 (H), Cr 1.20 (H), -332 (H)    MEDICATIONS  Medium sliding scale insulin, miralax, senokot    ASSESSED NUTRITION NEEDS PER APPROVED PRACTICE GUIDELINES:  Dosing Weight 48.2 kg  Estimated Energy Needs: 9404-4422 kcals (30-35 Kcal/Kg)  Justification: repletion  Estimated Protein Needs: 70-95 grams protein (1.5-2 g pro/Kg)  Justification: Repletion  Estimated Fluid Needs: 1 mL/kcal or per provider pending fluid status    NUTRITION DIAGNOSIS:  Inadequate oral intake related to low appetite at baseline as evidenced by pt report of eating less at baseline, need for ONS to help gain weight and meet needs, 50-75% intakes documented    NUTRITION INTERVENTIONS  Recommendations / Nutrition Prescription  See above    Implementation  Nutrition education: Per Provider order if indicated   Medical Food Supplement - ordered  Multivitamin/Mineral - ordered    Nutrition Goals  Patient to consume >75% of meals and 1 Glucerna/day    MONITORING AND EVALUATION:  Progress towards goals will be monitored and evaluated per protocol and Practice Guidelines    Yana Sanchez RD, LD  Clinical Dietitian - [facility]  "

## 2024-06-26 NOTE — PROGRESS NOTES
Orthopedic Surgery  Lisa Meraz  06/26/2024     Admit Date:  6/23/2024  POD: 2 Days Post-Op   Procedure(s):  Open reduction internal fixation of left intertrochanteric fracture with short intramedullary nail    Patient resting comfortably in bed.   States she feels good this morning  Pain controlled at rest.   Denies numbness, tingling, and spasms to the LLE.  Tolerating oral intake.    Denies nausea or vomiting.  Denies chest pain or shortness of breath.  No acute events overnight.    Temp:  [97.8  F (36.6  C)-98.6  F (37  C)] 97.8  F (36.6  C)  Pulse:  [61-82] 82  Resp:  [14-16] 16  BP: (121-138)/(65-70) 137/70  SpO2:  [88 %-97 %] 94 %    Alert and oriented. Intermittently forgetful. NAD. Non-toxic appearing.   Supplemental O2 intact, but no respiratory distress.  Left hip soft dressing is clean, dry, and intact. (ABD/tape)  No erythema of surrounding skin.  Mild swelling to the left lateral hip and buttock.   Thigh remains soft and compressible throughout.  Bilateral calves are soft, non-tender.  Left lower extremity is NVI.  Patient able to resist ankle dorsiflexion and plantar flexion bilaterally.  Able to flex and extend the toes.  DP pulse palpable.  Sensation intact bilateral lower extremities.    Labs/Imaging:  Recent Labs   Lab Test 06/26/24  0744 06/25/24  0657 06/24/24  0733 06/23/24  1046   WBC  --  8.6 9.5 7.1   HGB 9.3* 9.6* 8.8* 10.1*   PLT  --  207 196 192     Recent Labs   Lab Test 06/23/24  1046   INR 1.01     A/P    1. S/p left intertrochanteric femur fracture ORIF using CM device (DOS: 6/24/24)  -Continue PTA Eliquis 2.5 mg BID for DVT prophylaxis.    -Mobilize with PT/OT.  -WBAT LLE with walker.    -Continue current pain regimen.  -Dressings: Keep current soft dressing intact. Okay for RN to reinforce or change if needed. Order placed for RN to convert dressing to Xerform vs Adaptic, gauze, and tegaderm today. This dressing can remain in place thereafter, but okay for RN to change if  saturated >60% or peeling.  -Follow-up: 2 weeks post-op with Dr. Richy Dunbar/Reese Lofton PA-C    2. Disposition  -Anticipate d/c likely to TCU when medically cleared and progressing in PT.    [facility]    Attending addendum: I independently evaluated the patient on rounds.  Her pain is controlled.  She is sitting up in a chair today.  She is neurovascular intact in the operative extremity.  Continue plan as above.  Hemoglobin is 9.3.  No indication for transfusion.    Nils Dunbar MD

## 2024-06-27 ENCOUNTER — APPOINTMENT (OUTPATIENT)
Dept: CARDIOLOGY | Facility: CLINIC | Age: 89
DRG: 480 | End: 2024-06-27
Attending: STUDENT IN AN ORGANIZED HEALTH CARE EDUCATION/TRAINING PROGRAM
Payer: COMMERCIAL

## 2024-06-27 ENCOUNTER — APPOINTMENT (OUTPATIENT)
Dept: CT IMAGING | Facility: CLINIC | Age: 89
DRG: 480 | End: 2024-06-27
Attending: STUDENT IN AN ORGANIZED HEALTH CARE EDUCATION/TRAINING PROGRAM
Payer: COMMERCIAL

## 2024-06-27 LAB
ANION GAP SERPL CALCULATED.3IONS-SCNC: 9 MMOL/L (ref 7–15)
BUN SERPL-MCNC: 40.5 MG/DL (ref 8–23)
CALCIUM SERPL-MCNC: 8.9 MG/DL (ref 8.2–9.6)
CHLORIDE SERPL-SCNC: 88 MMOL/L (ref 98–107)
CREAT SERPL-MCNC: 1.16 MG/DL (ref 0.51–0.95)
DEPRECATED HCO3 PLAS-SCNC: 28 MMOL/L (ref 22–29)
EGFRCR SERPLBLD CKD-EPI 2021: 45 ML/MIN/1.73M2
ERYTHROCYTE [DISTWIDTH] IN BLOOD BY AUTOMATED COUNT: 14.6 % (ref 10–15)
GLUCOSE BLDC GLUCOMTR-MCNC: 242 MG/DL (ref 70–99)
GLUCOSE BLDC GLUCOMTR-MCNC: 261 MG/DL (ref 70–99)
GLUCOSE BLDC GLUCOMTR-MCNC: 325 MG/DL (ref 70–99)
GLUCOSE BLDC GLUCOMTR-MCNC: 341 MG/DL (ref 70–99)
GLUCOSE SERPL-MCNC: 247 MG/DL (ref 70–99)
HCT VFR BLD AUTO: 29.5 % (ref 35–47)
HGB BLD-MCNC: 9 G/DL (ref 11.7–15.7)
LVEF ECHO: NORMAL
MAGNESIUM SERPL-MCNC: 1.8 MG/DL (ref 1.7–2.3)
MCH RBC QN AUTO: 26.1 PG (ref 26.5–33)
MCHC RBC AUTO-ENTMCNC: 30.5 G/DL (ref 31.5–36.5)
MCV RBC AUTO: 86 FL (ref 78–100)
NT-PROBNP SERPL-MCNC: ABNORMAL PG/ML (ref 0–1800)
PLATELET # BLD AUTO: 235 10E3/UL (ref 150–450)
POTASSIUM SERPL-SCNC: 5 MMOL/L (ref 3.4–5.3)
RBC # BLD AUTO: 3.45 10E6/UL (ref 3.8–5.2)
SODIUM SERPL-SCNC: 124 MMOL/L (ref 135–145)
SODIUM SERPL-SCNC: 125 MMOL/L (ref 135–145)
WBC # BLD AUTO: 11.6 10E3/UL (ref 4–11)

## 2024-06-27 PROCEDURE — 80048 BASIC METABOLIC PNL TOTAL CA: CPT | Performed by: STUDENT IN AN ORGANIZED HEALTH CARE EDUCATION/TRAINING PROGRAM

## 2024-06-27 PROCEDURE — 83880 ASSAY OF NATRIURETIC PEPTIDE: CPT | Performed by: NURSE PRACTITIONER

## 2024-06-27 PROCEDURE — 93306 TTE W/DOPPLER COMPLETE: CPT

## 2024-06-27 PROCEDURE — 250N000009 HC RX 250: Performed by: INTERNAL MEDICINE

## 2024-06-27 PROCEDURE — 120N000001 HC R&B MED SURG/OB

## 2024-06-27 PROCEDURE — 250N000011 HC RX IP 250 OP 636: Performed by: INTERNAL MEDICINE

## 2024-06-27 PROCEDURE — 250N000013 HC RX MED GY IP 250 OP 250 PS 637

## 2024-06-27 PROCEDURE — 99222 1ST HOSP IP/OBS MODERATE 55: CPT | Mod: 24 | Performed by: INTERNAL MEDICINE

## 2024-06-27 PROCEDURE — 84295 ASSAY OF SERUM SODIUM: CPT | Performed by: STUDENT IN AN ORGANIZED HEALTH CARE EDUCATION/TRAINING PROGRAM

## 2024-06-27 PROCEDURE — 93306 TTE W/DOPPLER COMPLETE: CPT | Mod: 26 | Performed by: INTERNAL MEDICINE

## 2024-06-27 PROCEDURE — 99233 SBSQ HOSP IP/OBS HIGH 50: CPT | Performed by: STUDENT IN AN ORGANIZED HEALTH CARE EDUCATION/TRAINING PROGRAM

## 2024-06-27 PROCEDURE — 36415 COLL VENOUS BLD VENIPUNCTURE: CPT | Performed by: STUDENT IN AN ORGANIZED HEALTH CARE EDUCATION/TRAINING PROGRAM

## 2024-06-27 PROCEDURE — 83735 ASSAY OF MAGNESIUM: CPT | Performed by: STUDENT IN AN ORGANIZED HEALTH CARE EDUCATION/TRAINING PROGRAM

## 2024-06-27 PROCEDURE — 71260 CT THORAX DX C+: CPT

## 2024-06-27 PROCEDURE — 258N000003 HC RX IP 258 OP 636: Performed by: STUDENT IN AN ORGANIZED HEALTH CARE EDUCATION/TRAINING PROGRAM

## 2024-06-27 PROCEDURE — 250N000013 HC RX MED GY IP 250 OP 250 PS 637: Performed by: STUDENT IN AN ORGANIZED HEALTH CARE EDUCATION/TRAINING PROGRAM

## 2024-06-27 PROCEDURE — 85027 COMPLETE CBC AUTOMATED: CPT | Performed by: STUDENT IN AN ORGANIZED HEALTH CARE EDUCATION/TRAINING PROGRAM

## 2024-06-27 PROCEDURE — 250N000013 HC RX MED GY IP 250 OP 250 PS 637: Performed by: INTERNAL MEDICINE

## 2024-06-27 RX ORDER — LISINOPRIL 2.5 MG/1
2.5 TABLET ORAL DAILY
Status: DISCONTINUED | OUTPATIENT
Start: 2024-06-27 | End: 2024-06-28

## 2024-06-27 RX ORDER — IOPAMIDOL 755 MG/ML
53 INJECTION, SOLUTION INTRAVASCULAR ONCE
Status: COMPLETED | OUTPATIENT
Start: 2024-06-27 | End: 2024-06-27

## 2024-06-27 RX ORDER — CARVEDILOL 3.12 MG/1
3.12 TABLET ORAL 2 TIMES DAILY WITH MEALS
Status: DISCONTINUED | OUTPATIENT
Start: 2024-06-27 | End: 2024-06-28

## 2024-06-27 RX ADMIN — INSULIN ASPART 3 UNITS: 100 INJECTION, SOLUTION INTRAVENOUS; SUBCUTANEOUS at 21:35

## 2024-06-27 RX ADMIN — ACETAMINOPHEN 975 MG: 325 TABLET, FILM COATED ORAL at 20:32

## 2024-06-27 RX ADMIN — SENNOSIDES AND DOCUSATE SODIUM 1 TABLET: 50; 8.6 TABLET ORAL at 08:10

## 2024-06-27 RX ADMIN — Medication 1 TABLET: at 08:10

## 2024-06-27 RX ADMIN — SENNOSIDES AND DOCUSATE SODIUM 1 TABLET: 50; 8.6 TABLET ORAL at 20:32

## 2024-06-27 RX ADMIN — APIXABAN 2.5 MG: 2.5 TABLET, FILM COATED ORAL at 20:32

## 2024-06-27 RX ADMIN — CARVEDILOL 3.12 MG: 3.12 TABLET, FILM COATED ORAL at 18:32

## 2024-06-27 RX ADMIN — METHOCARBAMOL 250 MG: 500 TABLET ORAL at 20:52

## 2024-06-27 RX ADMIN — IOPAMIDOL 53 ML: 755 INJECTION, SOLUTION INTRAVENOUS at 11:11

## 2024-06-27 RX ADMIN — ACETAMINOPHEN 975 MG: 325 TABLET, FILM COATED ORAL at 13:12

## 2024-06-27 RX ADMIN — ACETAMINOPHEN 975 MG: 325 TABLET, FILM COATED ORAL at 04:34

## 2024-06-27 RX ADMIN — APIXABAN 2.5 MG: 2.5 TABLET, FILM COATED ORAL at 08:10

## 2024-06-27 RX ADMIN — SODIUM CHLORIDE 500 ML: 9 INJECTION, SOLUTION INTRAVENOUS at 10:34

## 2024-06-27 RX ADMIN — OXYCODONE HYDROCHLORIDE 5 MG: 5 TABLET ORAL at 21:38

## 2024-06-27 RX ADMIN — POLYETHYLENE GLYCOL 3350 17 G: 17 POWDER, FOR SOLUTION ORAL at 08:10

## 2024-06-27 RX ADMIN — LISINOPRIL 2.5 MG: 2.5 TABLET ORAL at 18:32

## 2024-06-27 RX ADMIN — SIMVASTATIN 10 MG: 10 TABLET, FILM COATED ORAL at 20:32

## 2024-06-27 RX ADMIN — SODIUM CHLORIDE 60 ML: 9 INJECTION, SOLUTION INTRAVENOUS at 16:46

## 2024-06-27 RX ADMIN — IOPAMIDOL 53 ML: 755 INJECTION, SOLUTION INTRAVENOUS at 16:46

## 2024-06-27 RX ADMIN — SODIUM CHLORIDE 60 ML: 9 INJECTION, SOLUTION INTRAVENOUS at 11:12

## 2024-06-27 ASSESSMENT — ACTIVITIES OF DAILY LIVING (ADL)
ADLS_ACUITY_SCORE: 37
ADLS_ACUITY_SCORE: 41
ADLS_ACUITY_SCORE: 37
ADLS_ACUITY_SCORE: 37
ADLS_ACUITY_SCORE: 40
ADLS_ACUITY_SCORE: 41
ADLS_ACUITY_SCORE: 37
ADLS_ACUITY_SCORE: 37

## 2024-06-27 NOTE — PROGRESS NOTES
Care Management Follow Up    Length of Stay (days): 4    Expected Discharge Date: 06/28/2024     Concerns to be Addressed:       Patient plan of care discussed at interdisciplinary rounds: Yes    Anticipated Discharge Disposition: Transitional Care     Anticipated Discharge Services:    Anticipated Discharge DME: [facility]    Patient/family educated on Medicare website which has current facility and service quality ratings: yes  Education Provided on the Discharge Plan:    Patient/Family in Agreement with the Plan: yes    Referrals Placed by CM/SW:    Private pay costs discussed: Not applicable    Additional Information:  Writer spoke with both patient's son and daughter regarding discharge. Patient has been accepted at her top choice for TCU, [facility]. Patient will likely discharge tomorrow. She will have to got through prior authorization process. Writer spoke with daughter Regina about adding her phone number to patient's chart. Regina agreed.     [facility]  Care Management

## 2024-06-27 NOTE — PROVIDER NOTIFICATION
Notified Hospitalist  that pt's Na+ is 125, 500cc NS bolus order received, plan to recheck sodium around noon.

## 2024-06-27 NOTE — PLAN OF CARE
Goal Outcome Evaluation:       Pt A&Ox4, forgetful. VSS on 1L via NC. Up 2A GB&W, voiding in BR. Up in chair for meals Mod carb diet, BG checks done. L hip dressing changed, CDI, CMS intact. R PIV SL. IV NS Bolus given for Na+ of 125, MD aware, 1400 recheck pending.  Awaiting Completion of Chest CT. Discharge to [facility] TCU pending medical stability,  Continue plan of care.

## 2024-06-27 NOTE — PROVIDER NOTIFICATION
Informed  that pt's bolus will be completed late d/t patient going down for CT and IV clotting, PIV access restored and bolus will complete around 12:30p. Informed to move Na+ recheck to 1400. Continue plan of care.

## 2024-06-27 NOTE — PROGRESS NOTES
[facility]    Medicine Progress Note - Hospitalist Service    Date of Admission:  6/23/2024    Assessment & Plan     Lisa Meraz is a 89 year old female who presents to the emergency room after mechanical fall earlier today. She was attempting to make her bed when she fell. Denies any loss of consciousness, but does state that she hit her head. Found to have left femur fracture. CTH and CT of cervical spine showed no trauma.      Mechanical Fall  CHT  Left IT fracture of proximal femur s/p ORIF left intertrochanteric fracture with short intramedullary nail 6/24/24  - IV dilaudid for pain, schedule tylenol   - Bowel regimen   - Orthopedics consulted, recommendations appreciated  - Pain control and VTE prophylaxis per orthopedic surgery, now back on PTA Apixiban 2.5 mg bid  - PT/OT, likely TCU placement     Hyperkalemia, resolved  Hyponatremia  ANDREA versus CKD   Cachexia   Creatinine trend since admission 1.07 > 1.10 > 1.17 > 1.20 > 1.16. Na trend 133 > 132 > 131 > 130 > 125. Attempted to diurese patient with 20 mg of IV lasix (due to hypoxia) when sodium was 130 and it dropped to 125. Urine sodium < 20, Urine osm 500, serum osm isotonic. Her urine studies also do not definitively fit into one category of hyponatremia but currently suspect hypovolemic hyponatremia given significant sodium drop with diuretic. We will trial rehydration with normal saline today.  - 500 ml bolus NS, repeat Na at 1400 to consider additional bolus, if sodium downtrending despite fluid bolus, will obtain nephrology consult  - Daily BMP  - Hold PTA ACE  - Nutrition consult placed 6/26    Post-operative Hypoxia  Patient has been on continuous oximetry for >24 hours and we have not been able to wean off of oxygen. She continues to require 1 lpm. She consistently drops to the mid 80's on room air. Chest XR showed right lower lobe infiltrate, versus effusion, versus atelectasis. Significantly reduced breath sounds at  right lung base. No wheezes, crackles, or rales. She is on DOAC but at lowered dose. Not tachycardic. Currently do not suspect PE. WBC now slightly elevated at 11.6 but patient without infectious symptoms. Patient's daughter notes she has lost a significant amount of weight over the past 6 months to a year. We discussed further characterizing the findings on chest XR on CT scan. Discussed risk of contrast induced injury but ultimately agreed to further imaging given her hypoxia and progressively worsening functional baseline. Briefly discussed possibility of malignancy. Patient also has a history of severe mitral regurgitation and gets echocardiograms every 6 months. Would also like to rule out a cardiac component.   - CT chest w/ IV contrast ordered  - TTE ordered   - Continue incentive spirometry  - Monitor for fever or infectious symptoms   - Continuous oximetry     Anxiety  Apparently anxious on admission. Calm and cooperative this morning. She reports prn medication for sleep (seroquel) has been helping a lot.   - Seroquel 6.25 mg BID prn and 12.5 mg at bedtime prn.  - Avoid anticholinergics     Hypertension  Intermittent atrial fibrillation  History of paroxysmal complete heart block s/p dual chamber permament pacemaker 5/10/21  H/o of Severe Mitral regurgitation  Denies any chest pain, palpitations or anginal equivalents on admission. She has a history of nonobstructive carotid artery disease, pulmonary hypertension, mild aortic valve stenosis, severe mitral valve regurgitation with a flail P2 leaflet. She follows with Dr. Kenney at [facility]. She is due for outpatient cardiology follow up and repeat TTE which is being checked every 6 months. EKG with ventricularly paced rhythm.   - Now back on DOAC s/p surgery   - Currently hold ace inhibitor given hyperkalemia and possible ANDREA  - Plan for outpatient cardiology follow up     DM Type II  Patient is on metformin. Has been hyperglycemic  during hospitalization. I think this is related to Glucerna which she has been taking to try to gain weight.    - Mod CHO diet, added carb coverage 1 unit for every 15 carbs on 6/27 given hyperglycemia  - Discussed holding glucerna on 6/28 with patient and daughter   - Sliding scale insulin  - Hold PTA metformin      Hyperlipidemia  - Continue statin     Constipation  - Bowel regimen    Normocytic anemia  Hemoglobin on admission 10.1, currently 9.3   - Continue to monitor           Diet: Moderate Consistent Carb (60 g CHO per Meal) Diet  Snacks/Supplements Adult: Glucerna; With Meals      DVT Prophylaxis: Pneumatic Compression Devices, PTA DOAC  Tiwari Catheter: Not present  Lines: None     Cardiac Monitoring: None  Code Status: Full Code      Clinically Significant Risk Factors                                  # Severe Malnutrition: based on nutrition assessment, PRESENT ON ADMISSION          Disposition Plan     Medically Ready for Discharge: Anticipated Tomorrow         Louis Zuluaga DO  Hospitalist Service  [facility]  Securely message with [employer] (more info)  Text page via [employer] Paging/Directory   ______________________________________________________________________    Interval History     - No acute events overnight  - Patient did not sleep great last night  - Denies pain today  - States she stood up and sat in chair yesterday  - Denies shortness of breath, cough, fever or any other symptoms     Physical Exam   Vital Signs: Temp: 98.1  F (36.7  C) Temp src: Oral BP: 134/79 Pulse: 80   Resp: 16 SpO2: 96 % O2 Device: Nasal cannula Oxygen Delivery: 1 LPM  Weight: 106 lbs 4.19 oz    Constitutional: awake, alert, cooperative, no apparent distress, and appears stated age, frail appearing  Respiratory: No increased work of breathing, good air exchange, decreased breath sounds at right base posteriorly  Cardiovascular: Normal apical impulse, regular rate and rhythm, normal S1 and S2, no  S3 or S4, 1-2+ systolic murmur  GI: No scars, normal bowel sounds, soft, non-distended, non-tender, no masses palpated, no hepatosplenomegally  Neurologic: Awake, alert, oriented to name, place and time.  Neuropsychiatric: General: normal, calm, and normal eye contact    Medical Decision Making       56 MINUTES SPENT BY ME on the date of service doing chart review, history, exam, documentation & further activities per the note.      Data   ------------------------- PAST 24 HR DATA REVIEWED -----------------------------------------------    I have personally reviewed the following data over the past 24 hrs:    N/A  \   9.3 (L)   / N/A     130 (L) 93 (L) 40.3 (H) /  261 (H)   4.6 29 1.20 (H) \       Imaging results reviewed over the past 24 hrs:   No results found for this or any previous visit (from the past 24 hour(s)).

## 2024-06-27 NOTE — CONSULTS
[facility]    Cardiology Consultation     Date of Admission:  6/23/2024    Assessment & Plan   Lisa Meraz is a 89 year old female who was admitted on 6/23/2024.    New cardiomyopathy, and patient had an echogram this admission which was reported to have an EF of 35 to 40%.  It is a technically challenging echo.  On my review, there is septal anteroseptal severe hypokinesis, more than what would be expected of pacemaker activation.  In my review, EF is mildly reduced.  This is new finding compared to the echo done at [facility] in December.  However patient is asymptomatic with no evidence of fluid overload.  She has had no cardiac symptoms prior to her mechanical fall that prompted this admission.  Severe eccentric MR due to flail P2 mitral leaflet, severe MR was again seen on the echo now.  2.  Mechanical fall with left intertrochanteric hip fracture, s/p internal fixation  3.  Paroxysmal atrial fibrillation with previous pacemaker implantation, on apixaban  4.  Hypertension on chlorthalidone      Discussion  At this time, patient has new cardiomyopathy but asymptomatic.  Given focal wall motion abnormality, patient could have underlying CAD.  In addition, LV dysfunction could have developed due to severe MR.  Since she is asymptomatic, I would continue medical therapy for now.  Will initiate Coreg at 3.125 mg twice daily and lisinopril 2.5 mg daily if blood pressure allows.  Will check N-terminal proBNP although clinically patient does not appear to be in fluid overload.  Further workup will have to be done as an outpatient.  She has an upcoming appointment with [facility] for echocardiogram on the 19 July and then follow-up with her cardiologist.  She can keep that appointment and they can do further workup.  If decreased EF is confirmed, she might need mitral valve clip procedure.    Thank you for allowing us to personally care of this nice patient.  Will sign off.  Recall if  questions.    Sincerely,    Kali Spaulding MD      Primary Care Physician   Physician No Ref-Primary    Reason for Consult   Reason for consult: I was asked by hospitalist to evaluate this patient for cardiomyopathy        History of Present Illness   Lisa Meraz is a 89 year old female who presents with mechanical fall and had a left intertrochanteric fracture of the proximal femur.    She has history of paroxysmal atrial fibrillation, congestive heart failure and previous pacemaker implantation.  She also has hypertension and type 2 diabetes.  Echocardiogram done last year in December revealed normal ejection fraction at an outside hospital.  Severe eccentric jet of mitral regurgitation was reported.  Mild to moderate tricuspid regurgitation.  She had a flail P2 leaflet.  She was asymptomatic and therefore was managed medically.    We were consulted because echocardiogram done in this hospital revealed moderate clear reduced ejection fraction which is new.  In addition chest x-ray showed bilateral small pleural effusions.  On admission, patient's EKG revealed atrial fibrillation with ventricular paced rhythm.  Creatinine on admission was 1.1 and now 1.16.  Sodium is 132 on admission now 125.  Hemoglobin was 9.    Past Medical History   Hypertension  Diabetes mellitus.   Mitral valve prolapse with flail P2 segment and severe mitral regurgitation  Moderate tricuspid regurgitation  Paroxysmal atrial fibrillation  pacemaker implantation      Past Surgical History   Past Surgical History:   Procedure Laterality Date    OPEN REDUCTION INTERNAL FIXATION HIP NAILING Left 6/24/2024    Procedure: Open reduction internal fixation of left intertrochanteric fracture with short intramedullary nail;  Surgeon: Richy Dunbar MD;  Location: [facility] OR     Pacemaker implantation,    Prior to Admission Medications   Prior to Admission Medications   Prescriptions Last Dose Informant Patient Reported? Taking?   acetaminophen  (TYLENOL) 500 MG tablet Unknown  Yes No   Sig: Take 500 mg by mouth every 6 hours as needed for mild pain   apixaban ANTICOAGULANT (ELIQUIS) 2.5 MG tablet 6/22/2024  Yes Yes   Sig: Take 2.5 mg by mouth 2 times daily   benazepril (LOTENSIN) 40 MG tablet 6/22/2024  Yes Yes   Sig: Take 40 mg by mouth daily   calcium carbonate (OS-JHONATHAN) 500 MG TABS 6/22/2024  Yes Yes   Sig: Take 1 tablet by mouth daily   chlorthalidone (HYGROTON) 25 MG tablet 6/22/2024  Yes Yes   Sig: Take 1 tablet by mouth daily   cholecalciferol ( ULTRA STRENGTH) 50 MCG (2000 UT) CAPS 6/22/2024  Yes Yes   Sig: Take 1 capsule by mouth daily   metFORMIN (GLUCOPHAGE XR) 500 MG 24 hr tablet 6/22/2024  Yes Yes   Sig: Take 500 mg by mouth 2 times daily (with meals)   methylcellulose (CITRUCEL) 500 MG TABS tablet 6/22/2024  Yes Yes   Sig: Take 500 mg by mouth daily   simvastatin (ZOCOR) 10 MG tablet 6/22/2024  Yes Yes   Sig: Take 1 tablet by mouth every evening      Facility-Administered Medications: None     Current Facility-Administered Medications   Medication Dose Route Frequency Provider Last Rate Last Admin    acetaminophen (TYLENOL) Suppository 650 mg  650 mg Rectal Q4H PRN Reese Lofton PA-C        acetaminophen (TYLENOL) tablet 650 mg  650 mg Oral Q4H PRN Reese Lofton PA-C        acetaminophen (TYLENOL) tablet 975 mg  975 mg Oral Q8H Reese Lofton PA-C   975 mg at 06/27/24 1312    apixaban ANTICOAGULANT (ELIQUIS) tablet 2.5 mg  2.5 mg Oral BID Reese Lofton PA-C   2.5 mg at 06/27/24 0810    benzocaine-menthol (CHLORASEPTIC) 6-10 MG lozenge 1 lozenge  1 lozenge Buccal Q1H PRN Reese Lofton PA-C        bisacodyl (DULCOLAX) suppository 10 mg  10 mg Rectal Daily PRN Reese Lofton PA-C        calcium carbonate (TUMS) chewable tablet 1,000 mg  1,000 mg Oral 4x Daily PRN Reese Lofton PA-C        glucose gel 15-30 g  15-30 g Oral Q15 Min PRN Reese Lofton PA-C        Or    dextrose 50 % injection 25-50 mL  25-50 mL Intravenous  Q15 Min PRN Reese Lofton PA-C        Or    glucagon injection 1 mg  1 mg Subcutaneous Q15 Min PRN Reese Lofton PA-C        HYDROmorphone (DILAUDID) injection 0.2 mg  0.2 mg Intravenous Q2H PRN Reese Lofton PA-C        Or    HYDROmorphone (PF) (DILAUDID) injection 0.5 mg  0.5 mg Intravenous Q2H PRN Reese Lofton PA-C        insulin aspart (NovoLOG) injection (RAPID ACTING)   Subcutaneous TID w/meals Louis Zuluaga DO   1 Units at 06/27/24 1346    insulin aspart (NovoLOG) injection (RAPID ACTING)  1-7 Units Subcutaneous TID AC Reese Lofton PA-C   3 Units at 06/27/24 1347    insulin aspart (NovoLOG) injection (RAPID ACTING)  1-5 Units Subcutaneous At Bedtime Reese Lofton PA-C   1 Units at 06/26/24 2147    lidocaine (LMX4) cream   Topical Q1H PRN Reese Lofton PA-C        lidocaine 1 % 0.1-1 mL  0.1-1 mL Other Q1H PRN Reese Lofton PA-C        magnesium hydroxide (MILK OF MAGNESIA) suspension 30 mL  30 mL Oral Daily PRN Reese Lofton PA-C        [Held by provider] metFORMIN (GLUCOPHAGE XR) 24 hr tablet 500 mg  500 mg Oral BID w/meals Louis Zuluaga,         methocarbamol (ROBAXIN) half-tab 250 mg  250 mg Oral TID PRN Reese Lofton PA-C        multivitamin w/minerals (THERA-VIT-M) tablet 1 tablet  1 tablet Oral Daily Louis Zuluaga DO   1 tablet at 06/27/24 0810    naloxone (NARCAN) injection 0.2 mg  0.2 mg Intravenous Q2 Min PRN Reese Lofton PA-C        Or    naloxone (NARCAN) injection 0.4 mg  0.4 mg Intravenous Q2 Min PRN Reese Lofton PA-C        Or    naloxone (NARCAN) injection 0.2 mg  0.2 mg Intramuscular Q2 Min PRN Reese Lofton PA-C        Or    naloxone (NARCAN) injection 0.4 mg  0.4 mg Intramuscular Q2 Min PRN Reese Lofton PA-C        ondansetron (ZOFRAN ODT) ODT tab 4 mg  4 mg Oral Q6H PRN Reese Lofton PA-C        Or    ondansetron (ZOFRAN) injection 4 mg  4 mg Intravenous Q6H PRN Reese Lofton PA-C        oxyCODONE (ROXICODONE) tablet 5 mg   5 mg Oral Q4H PRN Reese Lofton PA-C   5 mg at 06/26/24 1359    Or    oxyCODONE (ROXICODONE) tablet 10 mg  10 mg Oral Q4H PRN Reese Lofton PA-C        polyethylene glycol (MIRALAX) Packet 17 g  17 g Oral Daily Reese Lofton PA-C   17 g at 06/27/24 0810    prochlorperazine (COMPAZINE) injection 5 mg  5 mg Intravenous Q6H PRN Reese Lofton PA-C        Or    prochlorperazine (COMPAZINE) tablet 5 mg  5 mg Oral Q6H PRN Reese Lofton PA-C        QUEtiapine (SEROquel) half-tab 12.5 mg  12.5 mg Oral At Bedtime PRN Reese Lofton PA-C   12.5 mg at 06/25/24 2200    QUEtiapine (SEROquel) quarter-tab 6.25 mg  6.25 mg Oral BID PRN Reese Lofton PA-C   6.25 mg at 06/23/24 2132    senna-docusate (SENOKOT-S/PERICOLACE) 8.6-50 MG per tablet 1 tablet  1 tablet Oral BID Reese Lofton PA-C   1 tablet at 06/27/24 0810    senna-docusate (SENOKOT-S/PERICOLACE) 8.6-50 MG per tablet 1 tablet  1 tablet Oral BID PRN Reese Lofton PA-C        Or    senna-docusate (SENOKOT-S/PERICOLACE) 8.6-50 MG per tablet 2 tablet  2 tablet Oral BID PRN Reese Lofton PA-C        simvastatin (ZOCOR) tablet 10 mg  10 mg Oral QPM Reese Lofton PA-C   10 mg at 06/26/24 2023    sodium chloride (PF) 0.9% PF flush 3 mL  3 mL Intracatheter q1 min prn Reese Lofton PA-C        sodium chloride (PF) 0.9% PF flush 3 mL  3 mL Intracatheter Q8H Reese Lofton PA-C   3 mL at 06/27/24 0813    sodium chloride (PF) 0.9% PF flush 3 mL  3 mL Intracatheter q1 min prn Reese Lofton PA-C         Current Facility-Administered Medications   Medication Dose Route Frequency Provider Last Rate Last Admin    acetaminophen (TYLENOL) Suppository 650 mg  650 mg Rectal Q4H PRN Reese Lofton PA-C        acetaminophen (TYLENOL) tablet 650 mg  650 mg Oral Q4H PRN Reese Lofton PA-C        acetaminophen (TYLENOL) tablet 975 mg  975 mg Oral Q8H Reese Lofton PA-C   975 mg at 06/27/24 1312    apixaban ANTICOAGULANT (ELIQUIS) tablet 2.5 mg  2.5 mg  Oral BID Reese Lofton PA-C   2.5 mg at 06/27/24 0810    benzocaine-menthol (CHLORASEPTIC) 6-10 MG lozenge 1 lozenge  1 lozenge Buccal Q1H PRN Reese Lofton PA-C        bisacodyl (DULCOLAX) suppository 10 mg  10 mg Rectal Daily PRN Reese Lofton PA-C        calcium carbonate (TUMS) chewable tablet 1,000 mg  1,000 mg Oral 4x Daily PRN Reese Lofton PA-C        glucose gel 15-30 g  15-30 g Oral Q15 Min PRN Reese Lofton PA-C        Or    dextrose 50 % injection 25-50 mL  25-50 mL Intravenous Q15 Min PRN Reese Lofton PA-C        Or    glucagon injection 1 mg  1 mg Subcutaneous Q15 Min PRN Reese Lofton PA-C        HYDROmorphone (DILAUDID) injection 0.2 mg  0.2 mg Intravenous Q2H PRN Reese Lofton PA-C        Or    HYDROmorphone (PF) (DILAUDID) injection 0.5 mg  0.5 mg Intravenous Q2H PRN Reese Lofton PA-C        insulin aspart (NovoLOG) injection (RAPID ACTING)   Subcutaneous TID w/meals Louis Zuluaga DO   1 Units at 06/27/24 1346    insulin aspart (NovoLOG) injection (RAPID ACTING)  1-7 Units Subcutaneous TID AC Reese Lofton PA-C   3 Units at 06/27/24 1347    insulin aspart (NovoLOG) injection (RAPID ACTING)  1-5 Units Subcutaneous At Bedtime Reese Lofton PA-C   1 Units at 06/26/24 2147    lidocaine (LMX4) cream   Topical Q1H PRN Reese Lofton PA-C        lidocaine 1 % 0.1-1 mL  0.1-1 mL Other Q1H PRN Reese Lofton PA-C        magnesium hydroxide (MILK OF MAGNESIA) suspension 30 mL  30 mL Oral Daily PRN Reese Lofton PA-C        [Held by provider] metFORMIN (GLUCOPHAGE XR) 24 hr tablet 500 mg  500 mg Oral BID w/meals Louis Zuluaga DO        methocarbamol (ROBAXIN) half-tab 250 mg  250 mg Oral TID PRN Reese Lofton PA-C        multivitamin w/minerals (THERA-VIT-M) tablet 1 tablet  1 tablet Oral Daily Louis Zuluaga, DO   1 tablet at 06/27/24 0810    naloxone (NARCAN) injection 0.2 mg  0.2 mg Intravenous Q2 Min PRN Reese Lofton PA-C        Or    naloxone  (NARCAN) injection 0.4 mg  0.4 mg Intravenous Q2 Min PRN Reese Lofton PA-C        Or    naloxone (NARCAN) injection 0.2 mg  0.2 mg Intramuscular Q2 Min PRN Reese Lofton PA-C        Or    naloxone (NARCAN) injection 0.4 mg  0.4 mg Intramuscular Q2 Min PRN Reese Lofton PA-C        ondansetron (ZOFRAN ODT) ODT tab 4 mg  4 mg Oral Q6H PRN Reese Lofton PA-C        Or    ondansetron (ZOFRAN) injection 4 mg  4 mg Intravenous Q6H PRN Reese Lofton PA-C        oxyCODONE (ROXICODONE) tablet 5 mg  5 mg Oral Q4H PRN Reese Lofton PA-C   5 mg at 06/26/24 1359    Or    oxyCODONE (ROXICODONE) tablet 10 mg  10 mg Oral Q4H PRN Reese Lofton PA-C        polyethylene glycol (MIRALAX) Packet 17 g  17 g Oral Daily Reese Lofton PA-C   17 g at 06/27/24 0810    prochlorperazine (COMPAZINE) injection 5 mg  5 mg Intravenous Q6H PRN Reese Lofton PA-C        Or    prochlorperazine (COMPAZINE) tablet 5 mg  5 mg Oral Q6H PRN Reese Lofton PA-C        QUEtiapine (SEROquel) half-tab 12.5 mg  12.5 mg Oral At Bedtime PRN Reese Lofton PA-C   12.5 mg at 06/25/24 2200    QUEtiapine (SEROquel) quarter-tab 6.25 mg  6.25 mg Oral BID PRN Reese Lofton PA-C   6.25 mg at 06/23/24 2132    senna-docusate (SENOKOT-S/PERICOLACE) 8.6-50 MG per tablet 1 tablet  1 tablet Oral BID Reese Lofton PA-C   1 tablet at 06/27/24 0810    senna-docusate (SENOKOT-S/PERICOLACE) 8.6-50 MG per tablet 1 tablet  1 tablet Oral BID PRN Reese Lofton PA-C        Or    senna-docusate (SENOKOT-S/PERICOLACE) 8.6-50 MG per tablet 2 tablet  2 tablet Oral BID PRN Reese Lofton PA-C        simvastatin (ZOCOR) tablet 10 mg  10 mg Oral QPM Reese Lofton PA-C   10 mg at 06/26/24 2023    sodium chloride (PF) 0.9% PF flush 3 mL  3 mL Intracatheter q1 min prn Reese Lofton PA-C        sodium chloride (PF) 0.9% PF flush 3 mL  3 mL Intracatheter Q8H Reese Lofton PA-C   3 mL at 06/27/24 0813    sodium chloride (PF) 0.9% PF flush 3 mL  3  "mL Intracatheter q1 min prn Reese Lofton PA-C         Allergies   No Known Allergies    Social History      Does not smoke.    Family History     Not relevant       Review of Systems   A comprehensive review of system was performed and is negative other than that noted in the HPI or here.     Physical Exam   Vital Signs with Ranges  Temp:  [97.7  F (36.5  C)-98.1  F (36.7  C)] 98.1  F (36.7  C)  Pulse:  [63-80] 76  Resp:  [16] 16  BP: (126-134)/(60-79) 127/65  SpO2:  [96 %-100 %] 96 %  Wt Readings from Last 4 Encounters:   06/26/24 48.2 kg (106 lb 4.2 oz)     I/O last 3 completed shifts:  In: -   Out: 300 [Urine:300]      Vitals: /65 (BP Location: Right arm)   Pulse 76   Temp 98.1  F (36.7  C) (Oral)   Resp 16   Wt 48.2 kg (106 lb 4.2 oz)   SpO2 96%     Physical Exam:   General - Alert and oriented to time place and person in no acute distress  Eyes - No scleral icterus  HEENT - Neck supple, moist mucous membranes, JVP not raised  Cardiovascular -regular S1 and S2 within 2 x 6 pansystolic murmur in the mitral area  Extremities - Left hip fractire. S/P surgery  Respiratory - clear  Skin - No pallor or cyanosis  Gastrointestinal - Non tender and non distended without rebound or guarding  Psych - Appropriate affect       No lab results found in last 7 days.    Invalid input(s): \"TROPONINIES\"    Recent Labs   Lab 06/27/24  0813 06/27/24  0732 06/27/24  0130 06/26/24  0809 06/26/24  0744 06/25/24  0724 06/25/24  0657 06/24/24  0807 06/24/24  0733 06/23/24  1442 06/23/24  1046   WBC 11.6*  --   --   --   --   --  8.6  --  9.5  --  7.1   HGB 9.0*  --   --   --  9.3*  --  9.6*  --  8.8*  --  10.1*   MCV 86  --   --   --   --   --  88  --  86  --  88     --   --   --   --   --  207  --  196  --  192   INR  --   --   --   --   --   --   --   --   --   --  1.01   *  --   --   --  130*  --  131*  --  132*  --  133*   POTASSIUM 5.0  --   --   --  4.6  --  5.1   < > 5.4*  --  4.4   CHLORIDE 88*  --   " "--   --  93*  --  92*  --  93*  --  93*   CO2 28  --   --   --  29  --  29  --  29  --  29   BUN 40.5*  --   --   --  40.3*  --  43.4*  --  44.8*  --  36.7*   CR 1.16*  --   --   --  1.20*  --  1.17*  --  1.10*  --  1.07*   GFRESTIMATED 45*  --   --   --  43*  --  44*  --  47*  --  49*   ANIONGAP 9  --   --   --  8  --  10  --  10  --  11   JHONATHAN 8.9  --   --   --  8.9  --  10.2*  --  9.5  --  9.9*   * 242* 261*   < > 179*   < > 182*   < > 185*   < > 214*   ALBUMIN  --   --   --   --   --   --   --   --   --   --  4.0   PROTTOTAL  --   --   --   --   --   --   --   --   --   --  6.7   BILITOTAL  --   --   --   --   --   --   --   --   --   --  0.4   ALKPHOS  --   --   --   --   --   --   --   --   --   --  50   ALT  --   --   --   --   --   --   --   --   --   --  27   AST  --   --   --   --   --   --   --   --   --   --  27    < > = values in this interval not displayed.     No results for input(s): \"CHOL\", \"HDL\", \"LDL\", \"TRIG\", \"CHOLHDLRATIO\" in the last 66688 hours.  Recent Labs   Lab 06/27/24  0813 06/26/24  0744 06/25/24  0657 06/24/24  0733   WBC 11.6*  --  8.6 9.5   HGB 9.0* 9.3* 9.6* 8.8*   HCT 29.5*  --  30.6* 28.3*   MCV 86  --  88 86     --  207 196     No results for input(s): \"PH\", \"PHV\", \"PO2\", \"PO2V\", \"SAT\", \"PCO2\", \"PCO2V\", \"HCO3\", \"HCO3V\" in the last 168 hours.  No results for input(s): \"NTBNPI\", \"NTBNP\" in the last 168 hours.  No results for input(s): \"DD\" in the last 168 hours.  No results for input(s): \"SED\", \"CRP\" in the last 168 hours.  Recent Labs   Lab 06/27/24  0813 06/25/24  0657 06/24/24  0733    207 196     No results for input(s): \"TSH\" in the last 168 hours.  No results for input(s): \"COLOR\", \"APPEARANCE\", \"URINEGLC\", \"URINEBILI\", \"URINEKETONE\", \"SG\", \"UBLD\", \"URINEPH\", \"PROTEIN\", \"UROBILINOGEN\", \"NITRITE\", \"LEUKEST\", \"RBCU\", \"WBCU\" in the last 168 hours.    Imaging:  Recent Results (from the past 48 hour(s))   Echocardiogram Complete   Result Value    LVEF  35-40%    " [facility]    [id number]  [id number]  [id number]  [id number]^ROSEMARIE^ISA^AILEEN     [facility]  Echocardiography Laboratory  [address] [place] [address]     Name: MOODY KAT  MRN: [id number]  : 1934  Study Date: 2024 11:39 AM  Age: 89 yrs  Gender: Female  Patient Location: [facility]  Reason For Study: Mitral Regurgitation  Ordering Physician: ISA HOUSTON  Performed By: Cynthia Sanchez     BSA: 2.1 m2  Height: 65 in  Weight: 234 lb  HR: 62  BP: 127/65 mmHg  ______________________________________________________________________________  Procedure  Complete Portable Echo Adult.  ______________________________________________________________________________  Interpretation Summary     Technically challenging study due to patient body habitus.     The left ventricular cavity is small. Calculated LV wall thickness consistent  with concentric remodeling however visually appears to be concentric left  ventricular hypertrophy.  Left ventricular systolic function is moderately reduced. The visual ejection  fraction is 35-40%.  Hypokinesis of the mid to apical anterior, anteroseptal, inferoseptal, apex  wall segments.  The right ventricle is normal in size and function.  Mild aortic stenosis, Vmax 2.2 m/s, mean gradient 8 mmHg, CHERRIE 1.3cm2, DI 0.61,  SVi 25 cc/m2.  Mitral regurgitation is at least moderate, possibly severe, the regurgitant  jet is eccentric directed against the interatrial septum and so likely  underestimated.  Moderate (2+) tricuspid regurgitation.  Small posterior pericardial effusion.  There are no echocardiographic indications of cardiac tamponade.     There are no prior studies available for comparison.  ______________________________________________________________________________  Left Ventricle  The left ventricular cavity is small. Calculated LV wall thickness consistent  with concentric remodeling however visually appears to be concentric  left  ventricular hypertrophy. Left ventricular systolic function is moderately  reduced. The visual ejection fraction is 35-40%. Grade III or advanced  diastolic dysfunction. Hypokinesis of the mid to apical anterior,  anteroseptal, inferoseptal, apex wall segments.     Right Ventricle  The right ventricle is normal in size and function.     Atria  The left atrium is moderate to severely dilated. There is a catheter/pacemaker  lead seen in the right atrium.     Mitral Valve  Mitral regurgitation is at least moderate, possibly severe, the regurgitant  jet is eccentric directed against the interatrial septum and so likely  underestimated.     Tricuspid Valve  There is moderate (2+) tricuspid regurgitation. The right ventricular systolic  pressure is approximated at 29.3 mmHg plus the right atrial pressure.     Aortic Valve  Mild aortic stenosis, Vmax 2.2 m/s, mean gradient 8 mmHg, CHERRIE 1.3cm2, DI 0.61,  SVi 25 cc/m2.     Pulmonic Valve  The pulmonic valve is not well seen, but is grossly normal.     Vessels  The aortic root is normal size. Normal size ascending aorta.     Pericardium  Small posterior pericardial effusion. There are no echocardiographic  indications of cardiac tamponade.     ______________________________________________________________________________  MMode/2D Measurements & Calculations  IVSd: 1.3 cm  LVIDd: 3.6 cm  LVIDs: 2.5 cm  LVPWd: 1.5 cm  FS: 30.0 %     LV mass(C)d: 172.5 grams  LV mass(C)dI: 81.6 grams/m2  Ao root diam: 2.9 cm  LA dimension: 4.3 cm  asc Aorta Diam: 3.3 cm  LA/Ao: 1.5  LVOT diam: 1.6 cm  LVOT area: 2.1 cm2  Ao root diam index Ht(cm/m): 1.7  Ao root diam index BSA (cm/m2): 1.4  Asc Ao diam index BSA (cm/m2): 1.6  Asc Ao diam index Ht(cm/m): 2.0  LA Volume (BP): 102.0 ml  LA Volume Index (BP): 48.3 ml/m2     RV Base: 3.3 cm  RWT: 0.80  TAPSE: 1.8 cm     Doppler Measurements & Calculations  MV E max tonia: 136.0 cm/sec  MV A max tonia: 44.3 cm/sec  MV E/A: 3.1  MV max P.9  mmHg  MV mean PG: 3.2 mmHg  MV V2 VTI: 33.9 cm  MVA(VTI): 1.5 cm2  MV dec slope: 595.1 cm/sec2  MV dec time: 0.23 sec  Ao V2 max: 224.9 cm/sec  Ao max P.0 mmHg  Ao V2 mean: 121.9 cm/sec  Ao mean P.2 mmHg  Ao V2 VTI: 43.8 cm  CHERRIE(I,D): 1.2 cm2  CHERRIE(V,D): 1.3 cm2  LV V1 max P.6 mmHg  LV V1 max: 137.8 cm/sec  LV V1 VTI: 25.5 cm  MR PISA: 5.2 cm2  MR ERO: 0.45 cm2  MR volume: 68.3 ml  SV(LVOT): 52.4 ml  SI(LVOT): 24.8 ml/m2  PA acc time: 0.07 sec  TR max carlitos: 270.0 cm/sec  TR max P.3 mmHg  AV Carlitos Ratio (DI): 0.61  CHERRIE Index (cm2/m2): 0.57     RV S Carlitos: 11.0 cm/sec     ______________________________________________________________________________  Report approved by: Jeffry Walsh 2024 02:11 PM             Echo:  No results found for this or any previous visit (from the past 4320 hour(s)).    Clinically Significant Risk Factors         # Hyponatremia: Lowest Na = 125 mmol/L in last 2 days, will monitor as appropriate           # Acute heart failure with reduced ejection fraction: last echo with EF <40% and receiving IV diuretics              # Severe Malnutrition: based on nutrition assessment          Cardiac Arrhythmia: Atrial fibrillation: Persistent  Non-Rheumatic Valve Disease: Mitral valve insufficiency  Tricuspid valve insufficiency  Pericardial effusion    hyponatremia and Hypo-osmolality    CKD POA List: Stage 3a (GFR 45-59)    Pulmonary, pleural effusions      Pulmonary Heart Disease (Pulmonary hypertension or Cor pulmonale): Pulmonary Hypertension, unspecified

## 2024-06-27 NOTE — PLAN OF CARE
Goal Outcome Evaluation:      Plan of Care Reviewed With: patient    Overall Patient Progress: improvingOverall Patient Progress: improving    Outcome Evaluation: VSS, pain controlled, ambulated with assist. Discharge pending TCU    .Diagnosis: ORIF L hip, POD#3  Orientation/Cognitive: A&OX4/forgetful  Mobility Level: Ax1 GB/W  Pain Management: pain controlled with tylenol  Tele/VS/O2: VSS, 02@1L  Diet: mod carb  Bowel/Bladder: voided in BR  Skin : L hip dressing CDI  Drains/Devices: PIV SL  Discharge date/time: pending  TCU placement

## 2024-06-27 NOTE — PROGRESS NOTES
Per ED CT, OK for patient to have a 22 gauge IV as long as it works and able to flush. Informed CT that I'll be running IV TKO following bolus to keep IV patent. CT stated they would call when they're ready. Continue plan of care.

## 2024-06-27 NOTE — PROGRESS NOTES
Orthopedic Surgery  Lisa Meraz  06/27/2024     Admit Date:  6/23/2024  POD: 3 Days Post-Op   Procedure(s):  Open reduction internal fixation of left intertrochanteric fracture with short intramedullary nail    Patient resting comfortably in bed.   Reports she is doing okay and slowly improving.  Pain controlled at rest. Notes that her tailbone is uncomfortable with the application of pressure.  PT is going well per the patient. She enjoys sitting in her chair rather than in bed.  Denies numbness, tingling, and spasms to the LLE.  Tolerating oral intake.    Denies nausea or vomiting.  Denies chest pain or shortness of breath.  No acute events overnight.    Temp:  [97.7  F (36.5  C)-98.5  F (36.9  C)] 98.1  F (36.7  C)  Pulse:  [63-80] 76  Resp:  [16] 16  BP: (109-134)/(49-79) 127/65  SpO2:  [83 %-100 %] 96 %    Alert and oriented. Intermittently forgetful. NAD. Non-toxic appearing.   Left hip soft dressing is clean, dry, and but starting to peel slightly.  No erythema of surrounding skin.  Mild swelling to the left lateral hip and buttock.   Thigh remains soft and compressible throughout.  Bilateral calves are soft, non-tender.  Left lower extremity is NVI.  Patient able to resist ankle dorsiflexion and plantar flexion bilaterally.  Able to flex and extend the toes.  DP pulse palpable.  Sensation intact bilateral lower extremities.    Labs/Imaging:  Recent Labs   Lab Test 06/27/24  0813 06/26/24  0744 06/25/24  0657 06/24/24  0733   WBC 11.6*  --  8.6 9.5   HGB 9.0* 9.3* 9.6* 8.8*     --  207 196     Recent Labs   Lab Test 06/23/24  1046   INR 1.01     A/P    1. S/p left intertrochanteric femur fracture ORIF using CM device (DOS: 6/24/24)  -Continue PTA Eliquis 2.5 mg BID for DVT prophylaxis.    -Hgb slightly down today, but overall stable.  -Mobilize with PT/OT.  -WBAT LLE with walker.    -Continue current pain regimen.  -Dressings: Order previously placed for RN to convert dressing to Xerform vs  Adaptic, gauze, and tegaderm on POD#2, but this was never completed. RN notified and will complete dressing change today. This dressing can remain in place thereafter, but okay for RN to change if saturated >60% or peeling.  -Follow-up: 2 weeks post-op with Dr. Richy Dunbar/Reese Lofton PA-C    2. Disposition  -Anticipate d/c likely to TCU when medically cleared and progressing in PT. Ortho stable.    Lexis Mojica PA-C  [facility]

## 2024-06-28 ENCOUNTER — APPOINTMENT (OUTPATIENT)
Dept: PHYSICAL THERAPY | Facility: CLINIC | Age: 89
DRG: 480 | End: 2024-06-28
Payer: COMMERCIAL

## 2024-06-28 ENCOUNTER — APPOINTMENT (OUTPATIENT)
Dept: ULTRASOUND IMAGING | Facility: CLINIC | Age: 89
DRG: 480 | End: 2024-06-28
Attending: STUDENT IN AN ORGANIZED HEALTH CARE EDUCATION/TRAINING PROGRAM
Payer: COMMERCIAL

## 2024-06-28 LAB
ANION GAP SERPL CALCULATED.3IONS-SCNC: 11 MMOL/L (ref 7–15)
BUN SERPL-MCNC: 45.1 MG/DL (ref 8–23)
CALCIUM SERPL-MCNC: 8.5 MG/DL (ref 8.2–9.6)
CHLORIDE SERPL-SCNC: 90 MMOL/L (ref 98–107)
CREAT SERPL-MCNC: 1.21 MG/DL (ref 0.51–0.95)
DEPRECATED HCO3 PLAS-SCNC: 25 MMOL/L (ref 22–29)
EGFRCR SERPLBLD CKD-EPI 2021: 42 ML/MIN/1.73M2
ERYTHROCYTE [DISTWIDTH] IN BLOOD BY AUTOMATED COUNT: 14.8 % (ref 10–15)
GLUCOSE BLDC GLUCOMTR-MCNC: 187 MG/DL (ref 70–99)
GLUCOSE BLDC GLUCOMTR-MCNC: 220 MG/DL (ref 70–99)
GLUCOSE BLDC GLUCOMTR-MCNC: 237 MG/DL (ref 70–99)
GLUCOSE BLDC GLUCOMTR-MCNC: 257 MG/DL (ref 70–99)
GLUCOSE BLDC GLUCOMTR-MCNC: 263 MG/DL (ref 70–99)
GLUCOSE BLDC GLUCOMTR-MCNC: 374 MG/DL (ref 70–99)
GLUCOSE BODY FLUID SOURCE: NORMAL
GLUCOSE FLD-MCNC: 267 MG/DL
GLUCOSE SERPL-MCNC: 229 MG/DL (ref 70–99)
HCT VFR BLD AUTO: 29.6 % (ref 35–47)
HGB BLD-MCNC: 9.5 G/DL (ref 11.7–15.7)
LD BODY BODY FLUID SOURCE: NORMAL
LDH FLD L TO P-CCNC: 106 U/L
LDH SERPL L TO P-CCNC: 246 U/L (ref 0–250)
MCH RBC QN AUTO: 27.8 PG (ref 26.5–33)
MCHC RBC AUTO-ENTMCNC: 32.1 G/DL (ref 31.5–36.5)
MCV RBC AUTO: 87 FL (ref 78–100)
PLATELET # BLD AUTO: 266 10E3/UL (ref 150–450)
POTASSIUM SERPL-SCNC: 4.9 MMOL/L (ref 3.4–5.3)
PROT FLD-MCNC: 2.1 G/DL
PROT SERPL-MCNC: 6 G/DL (ref 6.4–8.3)
PROTEIN BODY FLUID SOURCE: NORMAL
RBC # BLD AUTO: 3.42 10E6/UL (ref 3.8–5.2)
SODIUM SERPL-SCNC: 126 MMOL/L (ref 135–145)
WBC # BLD AUTO: 9.6 10E3/UL (ref 4–11)

## 2024-06-28 PROCEDURE — 83615 LACTATE (LD) (LDH) ENZYME: CPT | Performed by: STUDENT IN AN ORGANIZED HEALTH CARE EDUCATION/TRAINING PROGRAM

## 2024-06-28 PROCEDURE — 250N000013 HC RX MED GY IP 250 OP 250 PS 637

## 2024-06-28 PROCEDURE — 250N000009 HC RX 250: Performed by: RADIOLOGY

## 2024-06-28 PROCEDURE — 84157 ASSAY OF PROTEIN OTHER: CPT | Performed by: STUDENT IN AN ORGANIZED HEALTH CARE EDUCATION/TRAINING PROGRAM

## 2024-06-28 PROCEDURE — 89050 BODY FLUID CELL COUNT: CPT | Performed by: STUDENT IN AN ORGANIZED HEALTH CARE EDUCATION/TRAINING PROGRAM

## 2024-06-28 PROCEDURE — 258N000003 HC RX IP 258 OP 636: Performed by: INTERNAL MEDICINE

## 2024-06-28 PROCEDURE — 120N000001 HC R&B MED SURG/OB

## 2024-06-28 PROCEDURE — 250N000013 HC RX MED GY IP 250 OP 250 PS 637: Performed by: INTERNAL MEDICINE

## 2024-06-28 PROCEDURE — 250N000013 HC RX MED GY IP 250 OP 250 PS 637: Performed by: STUDENT IN AN ORGANIZED HEALTH CARE EDUCATION/TRAINING PROGRAM

## 2024-06-28 PROCEDURE — 36415 COLL VENOUS BLD VENIPUNCTURE: CPT | Performed by: STUDENT IN AN ORGANIZED HEALTH CARE EDUCATION/TRAINING PROGRAM

## 2024-06-28 PROCEDURE — 87070 CULTURE OTHR SPECIMN AEROBIC: CPT | Performed by: STUDENT IN AN ORGANIZED HEALTH CARE EDUCATION/TRAINING PROGRAM

## 2024-06-28 PROCEDURE — 0W993ZZ DRAINAGE OF RIGHT PLEURAL CAVITY, PERCUTANEOUS APPROACH: ICD-10-PCS | Performed by: RADIOLOGY

## 2024-06-28 PROCEDURE — 97530 THERAPEUTIC ACTIVITIES: CPT | Mod: GP | Performed by: PHYSICAL THERAPIST

## 2024-06-28 PROCEDURE — 80048 BASIC METABOLIC PNL TOTAL CA: CPT | Performed by: STUDENT IN AN ORGANIZED HEALTH CARE EDUCATION/TRAINING PROGRAM

## 2024-06-28 PROCEDURE — 32555 ASPIRATE PLEURA W/ IMAGING: CPT

## 2024-06-28 PROCEDURE — 82945 GLUCOSE OTHER FLUID: CPT | Performed by: STUDENT IN AN ORGANIZED HEALTH CARE EDUCATION/TRAINING PROGRAM

## 2024-06-28 PROCEDURE — 84155 ASSAY OF PROTEIN SERUM: CPT | Performed by: STUDENT IN AN ORGANIZED HEALTH CARE EDUCATION/TRAINING PROGRAM

## 2024-06-28 PROCEDURE — 99223 1ST HOSP IP/OBS HIGH 75: CPT | Mod: 25 | Performed by: INTERNAL MEDICINE

## 2024-06-28 PROCEDURE — 85027 COMPLETE CBC AUTOMATED: CPT | Performed by: STUDENT IN AN ORGANIZED HEALTH CARE EDUCATION/TRAINING PROGRAM

## 2024-06-28 PROCEDURE — 99233 SBSQ HOSP IP/OBS HIGH 50: CPT | Performed by: STUDENT IN AN ORGANIZED HEALTH CARE EDUCATION/TRAINING PROGRAM

## 2024-06-28 RX ORDER — LIDOCAINE HYDROCHLORIDE 10 MG/ML
10 INJECTION, SOLUTION EPIDURAL; INFILTRATION; INTRACAUDAL; PERINEURAL ONCE
Status: COMPLETED | OUTPATIENT
Start: 2024-06-28 | End: 2024-06-28

## 2024-06-28 RX ORDER — MULTIPLE VITAMINS W/ MINERALS TAB 9MG-400MCG
1 TAB ORAL DAILY
DISCHARGE
Start: 2024-06-29 | End: 2024-08-07

## 2024-06-28 RX ORDER — SODIUM CHLORIDE 9 MG/ML
INJECTION, SOLUTION INTRAVENOUS CONTINUOUS
Status: DISCONTINUED | OUTPATIENT
Start: 2024-06-28 | End: 2024-06-30

## 2024-06-28 RX ORDER — QUETIAPINE FUMARATE 25 MG/1
12.5 TABLET, FILM COATED ORAL
DISCHARGE
Start: 2024-06-28 | End: 2024-08-06

## 2024-06-28 RX ORDER — CARVEDILOL 3.12 MG/1
6.25 TABLET ORAL 2 TIMES DAILY WITH MEALS
Status: DISCONTINUED | OUTPATIENT
Start: 2024-06-28 | End: 2024-07-05 | Stop reason: HOSPADM

## 2024-06-28 RX ORDER — CARVEDILOL 6.25 MG/1
6.25 TABLET ORAL 2 TIMES DAILY WITH MEALS
DISCHARGE
Start: 2024-06-29 | End: 2024-07-05

## 2024-06-28 RX ADMIN — ACETAMINOPHEN 650 MG: 325 TABLET, FILM COATED ORAL at 17:24

## 2024-06-28 RX ADMIN — OXYCODONE HYDROCHLORIDE 5 MG: 5 TABLET ORAL at 19:30

## 2024-06-28 RX ADMIN — SODIUM CHLORIDE: 9 INJECTION, SOLUTION INTRAVENOUS at 16:09

## 2024-06-28 RX ADMIN — LISINOPRIL 2.5 MG: 2.5 TABLET ORAL at 08:09

## 2024-06-28 RX ADMIN — Medication 1 TABLET: at 08:07

## 2024-06-28 RX ADMIN — OXYCODONE HYDROCHLORIDE 5 MG: 5 TABLET ORAL at 19:01

## 2024-06-28 RX ADMIN — METHOCARBAMOL 250 MG: 500 TABLET ORAL at 17:30

## 2024-06-28 RX ADMIN — CARVEDILOL 6.25 MG: 3.12 TABLET, FILM COATED ORAL at 17:25

## 2024-06-28 RX ADMIN — LIDOCAINE HYDROCHLORIDE 10 ML: 10 INJECTION, SOLUTION EPIDURAL; INFILTRATION; INTRACAUDAL; PERINEURAL at 10:55

## 2024-06-28 RX ADMIN — SENNOSIDES AND DOCUSATE SODIUM 1 TABLET: 50; 8.6 TABLET ORAL at 08:11

## 2024-06-28 RX ADMIN — SIMVASTATIN 10 MG: 10 TABLET, FILM COATED ORAL at 20:30

## 2024-06-28 RX ADMIN — INSULIN ASPART 4 UNITS: 100 INJECTION, SOLUTION INTRAVENOUS; SUBCUTANEOUS at 22:34

## 2024-06-28 RX ADMIN — CARVEDILOL 3.12 MG: 3.12 TABLET, FILM COATED ORAL at 08:07

## 2024-06-28 RX ADMIN — ACETAMINOPHEN 650 MG: 325 TABLET, FILM COATED ORAL at 10:37

## 2024-06-28 RX ADMIN — APIXABAN 2.5 MG: 2.5 TABLET, FILM COATED ORAL at 20:30

## 2024-06-28 ASSESSMENT — ACTIVITIES OF DAILY LIVING (ADL)
ADLS_ACUITY_SCORE: 37
ADLS_ACUITY_SCORE: 34
ADLS_ACUITY_SCORE: 33
ADLS_ACUITY_SCORE: 37
ADLS_ACUITY_SCORE: 37
ADLS_ACUITY_SCORE: 34
ADLS_ACUITY_SCORE: 33
ADLS_ACUITY_SCORE: 34
ADLS_ACUITY_SCORE: 34
ADLS_ACUITY_SCORE: 33
ADLS_ACUITY_SCORE: 37
ADLS_ACUITY_SCORE: 33
ADLS_ACUITY_SCORE: 34

## 2024-06-28 NOTE — PROGRESS NOTES
Medicine Progress Note - Hospitalist Service    Date of Admission:  6/23/2024    Assessment & Plan     Lisa Meraz is a 89 year old female who presents to the emergency room after mechanical fall earlier today. She was attempting to make her bed when she fell. Denies any loss of consciousness, but does state that she hit her head. Found to have left femur fracture. CTH and CT of cervical spine showed no trauma. Anticipate medically stable for discharge in 1-2 days.     Mechanical Fall  CHT  Left IT fracture of proximal femur s/p ORIF left intertrochanteric fracture with short intramedullary nail 6/24/24  - IV dilaudid for pain, schedule tylenol   - Bowel regimen   - Orthopedics consulted, recommendations appreciated  - Pain control and VTE prophylaxis per orthopedic surgery, now back on PTA Apixiban 2.5 mg bid  - PT/OT, likely TCU placement     Hyperkalemia, resolved  Hyponatremia  ANDREA versus CKD   Cachexia   Creatinine trend since admission 1.07 > 1.10 > 1.17 > 1.20 > 1.16. Na trend 133 > 132 > 131 > 130 > 125 > 124 > 126. Attempted to diurese patient with 20 mg of IV lasix (due to hypoxia) when sodium was 130 and it dropped to 125. Urine sodium < 20, Urine osm 500, serum osm isotonic. Suspecting hypovolemic hyponatremia.   - Appreciate nephrology assistance, rehydrating with NS 6/28, if Na normalizing on 6/29 patient would be medically stable for discharge to TCU  - Daily BMP  - Nutrition consult placed 6/26    Post-operative Hypoxia, resolved  Right Transudative Pleural effusion s/p US thoracentesis 6/28  Patient has been on continuous oximetry for >24 hours and we have not been able to wean off of oxygen. She continues to require 1 lpm. She consistently drops to the mid 80's on room air. Chest XR showed right lower lobe infiltrate, versus effusion, versus atelectasis. Significantly reduced breath sounds at right lung base. No wheezes, crackles, or rales. She is on  DOAC but at lowered dose. Not tachycardic. Currently do not suspect PE. WBC now slightly elevated at 11.6 but patient without infectious symptoms. Chest CT showed large right pleural effusion. S/p US thoracentesis on 6/28. Findings consistent with transudate effusion, perhaps in setting of atelectasis after surgery. BNP is elevated however patient appears more volume down on exam so do not suspect acute HFrEF.  - Repeat chest XR ordered for 6/29, if stable/improving and patient off oxygen, she will be medically stable for discharge     Anxiety  Apparently anxious on admission. Calm and cooperative this morning. She reports prn medication for sleep (seroquel) has been helping a lot.   - Seroquel 6.25 mg BID prn and 12.5 mg at bedtime prn.  - Avoid anticholinergics     Hypertension  Intermittent atrial fibrillation  History of paroxysmal complete heart block s/p dual chamber permament pacemaker 5/10/21  H/o of Severe Mitral regurgitation  Denies any chest pain, palpitations or anginal equivalents on admission. She has a history of nonobstructive carotid artery disease, pulmonary hypertension, mild aortic valve stenosis, severe mitral valve regurgitation with a flail P2 leaflet. She follows with Dr. Kenney at [facility]. She is due for outpatient cardiology follow up and repeat TTE which is being checked every 6 months. EKG with ventricularly paced rhythm. Repeat TTE on 6/27 due to post-operative hypoxia showing new WMA and decrease in EF. However patient    - Now back on DOAC s/p surgery   - Cardiology consulted due to reduced EF and new WMA seen on TTE, recommending GDMT and outpatient cardiology follow up for further risk stratification  - Started on low dose lisinopril and COREG, recommend continuing these on discharge   - Holding PTA chlorthalidone and benazepril given normotension, likely to hold on discharge    DM Type II  Patient is on metformin. Has been hyperglycemic during hospitalization.  I think this is related to Glucerna which she has been taking to try to gain weight.    - Mod CHO diet, added carb coverage 1 unit for every 15 carbs on 6/27 given hyperglycemia  - Discussed holding glucerna on 6/28 with patient and daughter   - Sliding scale insulin  - Hold PTA metformin, resume on discharge      Hyperlipidemia  - Continue statin     Constipation  - Bowel regimen    Normocytic anemia  Hemoglobin on admission 10.1, currently 9.3   - Continue to monitor           Diet: Moderate Consistent Carb (60 g CHO per Meal) Diet  Snacks/Supplements Adult: Glucerna; With Meals      DVT Prophylaxis: Pneumatic Compression Devices, PTA DOAC  Tiwari Catheter: Not present  Lines: None     Cardiac Monitoring: None  Code Status: Full Code      Clinically Significant Risk Factors         # Hyponatremia: Lowest Na = 124 mmol/L in last 2 days, will monitor as appropriate           # Acute heart failure with reduced ejection fraction: last echo with EF <40% and receiving IV diuretics                # Severe Malnutrition: based on nutrition assessment           Disposition Plan     Medically Ready for Discharge: Anticipated Tomorrow         Louis Zuluaga DO  Hospitalist Service  [facility]  Securely message with [employer] (more info)  Text page via [employer] Paging/Directory   ______________________________________________________________________    Interval History     - No acute events overnight  - Patient slept well overnight  - Denies pain today  - No longer requiring oxygen after US thoracentesis  - No other acute concerns   - Updated patient family at bedside     Physical Exam   Vital Signs: Temp: 97.4  F (36.3  C) Temp src: Oral BP: 110/49 Pulse: 69   Resp: 16 SpO2: 96 % O2 Device: None (Room air) Oxygen Delivery: 1 LPM  Weight: 106 lbs 4.19 oz    Constitutional: awake, alert, cooperative, no apparent distress, and appears stated age, frail appearing  Respiratory: No increased work of breathing,  good air exchange, improved aeration right lower lung base.  Cardiovascular: Normal apical impulse, regular rate and rhythm, normal S1 and S2, no S3 or S4, 1-2+ systolic murmur  GI: No scars, normal bowel sounds, soft, non-distended, non-tender, no masses palpated, no hepatosplenomegally  Neurologic: Awake, alert, oriented to name, place and time.  Neuropsychiatric: General: normal, calm, and normal eye contact    Medical Decision Making       58 MINUTES SPENT BY ME on the date of service doing chart review, history, exam, documentation & further activities per the note.      Data   ------------------------- PAST 24 HR DATA REVIEWED -----------------------------------------------    I have personally reviewed the following data over the past 24 hrs:    9.6  \   9.5 (L)   / 266     126 (L) 90 (L) 45.1 (H) /  237 (H)   4.9 25 1.21 (H) \     ALT: N/A AST: N/A AP: N/A TBILI: N/A   ALB: N/A TOT PROTEIN: 6.0 (L) LIPASE: N/A     Trop: N/A BNP: N/A     Ferritin:  N/A % Retic:  N/A LDH:  246       Imaging results reviewed over the past 24 hrs:   Recent Results (from the past 24 hour(s))   CT Chest w Contrast    Narrative    EXAM: CT CHEST W CONTRAST  LOCATION: [facility]  DATE: 6/27/2024    INDICATION: persistent hypoxia, possible right pleural effusion  COMPARISON: None.  TECHNIQUE: CT chest with IV contrast. Multiplanar reformats were obtained. Dose reduction techniques were used.    CONTRAST: 53 mL Isovue 370    FINDINGS:   LUNGS AND PLEURA: Large right pleural effusion with complete atelectasis of right lower lobe and moderate atelectasis of right middle lobe.  Tiny left pleural effusion with minimal atelectasis at left base.    MEDIASTINUM/AXILLAE: Mild cardiomegaly. Pacemaker present. No lymphadenopathy. Heavy calcifications of mitral annulus.    CORONARY ARTERY CALCIFICATION: Moderate.    UPPER ABDOMEN: Unremarkable.    MUSCULOSKELETAL: Unremarkable.      Impression    IMPRESSION:   1.  Large  right pleural effusion with associated presence of atelectasis.  2.  Very small volume left pleural effusion.   US Thoracentesis    Narrative    EXAM:  1. RIGHT THORACENTESIS  2. ULTRASOUND GUIDANCE  LOCATION: [facility]  DATE/TIME: 6/28/2024 11:11 AM    INDICATION: Pleural effusion.    PROCEDURE: Informed consent obtained. Time out performed. The chest  was prepped and draped in a sterile fashion. 10 mL of 1% lidocaine was  infused into local soft tissues. A 5 Ethiopian catheter system was  introduced into the pleural effusion under ultrasound guidance.    0.8 liters of clear fluid were removed and sent to lab if requested.    Patient tolerated procedure well.    Ultrasound images have been permanently captured for documentation.      Impression    IMPRESSION:  Status post ultrasound-guided right thoracentesis.    SHEREEN WALDROP MD         SYSTEM ID:  [id number]

## 2024-06-28 NOTE — PROGRESS NOTES
Orthopedic Surgery  Lisa Meraz  06/28/2024     Admit Date:  6/23/2024  POD: 4 Days Post-Op   Procedure(s):  Open reduction internal fixation of left intertrochanteric fracture with short intramedullary nail    Patient resting comfortably in chair.   Reports she is doing well today.  Pain improving to the left hip.  Denies numbness, tingling, and spasms to the LLE.  Tolerating oral intake.    Denies nausea or vomiting.  Denies chest pain or shortness of breath.  No acute events overnight.    Temp:  [97.4  F (36.3  C)-97.8  F (36.6  C)] 97.8  F (36.6  C)  Pulse:  [60-83] 76  Resp:  [16] 16  BP: (107-133)/(57-74) 124/63  SpO2:  [94 %-100 %] 94 %    Alert and oriented. Intermittently forgetful. NAD. Non-toxic appearing.   Left hip gauze/tegaderm dressings are clean, dry, and intact.  No erythema of surrounding skin.  Mild swelling to the left lateral hip and buttock.   Thigh remains soft and compressible throughout.  Bilateral calves are soft, non-tender.  Left lower extremity is NVI.  Patient able to resist ankle dorsiflexion and plantar flexion bilaterally.  Able to flex and extend the toes.  DP pulse palpable.  Sensation intact bilateral lower extremities.    Labs/Imaging:  Recent Labs   Lab Test 06/28/24  0714 06/27/24  0813 06/26/24  0744 06/25/24  0657   WBC 9.6 11.6*  --  8.6   HGB 9.5* 9.0* 9.3* 9.6*    235  --  207     Recent Labs   Lab Test 06/23/24  1046   INR 1.01     A/P    1. S/p left intertrochanteric femur fracture ORIF using CM device (DOS: 6/24/24)  -Continue PTA Eliquis 2.5 mg BID for DVT prophylaxis.    -Hgb stable.  -Mobilize with PT/OT.  -WBAT LLE with walker.    -Continue current pain regimen.  -Dressings: Keep intact, but okay for RN to change if saturated >60% or peeling.  -Follow-up: 2 weeks post-op with Dr. Richy Dunbar/Reese Lofton PA-C    2. Disposition  -Anticipate d/c likely to TCU when medically cleared and progressing in PT. Ortho stable. Ortho team will sign off at  this time, but will see the patient on POD#14 if still inpatient. Otherwise, she can follow up with Dr. Dunbar's team as above. Please contact our team with any questions or concerns.    Lexis Mojica PA-C  [facility]

## 2024-06-28 NOTE — PROGRESS NOTES
Care Management Follow Up    Length of Stay (days): 5    Expected Discharge Date: 06/29/2024     Concerns to be Addressed:       Patient plan of care discussed at interdisciplinary rounds: Yes    Anticipated Discharge Disposition: Transitional Care     Anticipated Discharge Services:    Anticipated Discharge DME: Walker    Patient/family educated on Medicare website which has current facility and service quality ratings: yes  Education Provided on the Discharge Plan:    Patient/Family in Agreement with the Plan: yes    Referrals Placed by CM/SW:    Private pay costs discussed: Not applicable    Additional Information:  Writer spoke to Darcy with [facility] regarding patient arriving today. They would like her there by 1400. Reviewed chart and patient would discharge via wheelchair. Call to [facility] and set up wheelchair today between [phone].     Message sent to Dr. Zuluaga regarding patient discharging. He noted that she will likely stay the weekend. Writer cancelled the ride and left a voicemail for Darcy at [phone] that it was unlikely that she would discharge today. Will continue to follow for discharge planning.    Spoke to patient's daughter who would like to know if [facility] does weekend admissions. Call to Darcy and second  left asking this question. Updated family that when they respond writer will keep them updated.  GENESIS Lisa

## 2024-06-28 NOTE — PLAN OF CARE
Date/Time: 6/27/24 [id number]    Trauma/Ortho/Medical (Choose one) Trauma     Diagnosis: Left Hip Fx  POD#: 3 ORIF  Mental Status: A&O forgetful  Activity/dangle: A2 GB Walker   Diet: Mod Cho  Pain: PRN Oxy  Tiwari/Voiding: Voiding   Tele/Restraints/Iso: NA  02/LDA: 1L NC  D/C Date: Pending  Other Info: Chest CT this evening for hypoxia showing large right pleural effusion, ECHO with reduced EF, Cards consult,  Neph consult

## 2024-06-28 NOTE — CONSULTS
Nephrology Consultation      Lisa Meraz MRN# [id number]   YOB: 1934 Age: 89 year old   Date of Admission: 6/23/2024     Reason for consult: I was asked by Dr. Zuluaga to evaulate this patient for hyponatremia.           Assessment and Plan:   Hyponatremia: The sodium on admission was 133.  She was on chlorthalidone which is a thiazide diuretic and can cause hyponatremia.  This has been stopped in the hospital.  Her thyroid status is unknown.  There is no evidence of adrenal insufficiency.  On admission her creatinine was 1.07 with an estimated GFR 49 consistent with chronic kidney disease stage IIIa.  She has never seen a nephrologist.  Today her sodium is 126 but corrects for glucose to 128.  Potassium 4.9 bicarbonate 25 creatinine 1.21 with estimated GFR 42.    In the hospital for metformin and chlorthalidone have been stopped.  NP apixaban was held and her benazepril was also held.  When her hyponatremia became evident laboratories were checked which showed a urine sodium less than 20 and urine osmolality elevated at 512 and a plasma osmolality of 294.  She was given a total of 1 L of IV fluids including 500 and lactated Ringer's and 500 of saline.    She denies headache and has no mental status changes associated with low sodium.    Our plan will be to give her some IV fluids.  With a low urine sodium and high urine osmolality she may simply be dry.  Her exam shows no evidence at all of fluid overload: Her neck veins are flat there is no edema her lungs are clear.  We will give her normal saline for 1.5 L and recheck labs in the morning.    Acute kidney injury, mild: On admission her creatinine was 1.07 and estimated GFR 49 consistent with chronic kidney disease stage IIIa.  Risk factors for chronic kidney disease include diabetes and hypertension.  She also has cardiovascular disease.  She was on an ACE inhibitor when she came in and metformin but these have been stopped.  She has gotten IV  contrast in the hospital and also diuretics.  There is no exposure to nonsteroidal anti-inflammatory drugs.  We will stop her lisinopril which was recently started and we will avoid restarting metformin until sometime in the future when she is stable again as an outpatient.  Her blood pressure is not high at this point, systolic 107-133 and diastolic 57-74.    I will give her some saline, 1500 cc over 15 hours.  We will recheck her kidney function in the morning.  We should avoid metformin and ACE inhibitor for now.  We should avoid further contrast and not give her any nonsteroidal anti-inflammatory drugs.    Hypertension: She was on a diuretic ACE and ACE inhibitor as an outpatient.  Will stop both of those.  Currently she is on carvedilol and I will increase the dose slightly and we will follow her along with this.  This would be good for her atrial fibrillation and her hypertension.  In the future she can be started on an ACE inhibitor or ARB once her kidney function is stable and she has recovered from her surgery.    Diabetes: I would not use metformin for now.  In the future when she has stabilized this could be restarted but I would avoid it now in the setting of mild acute kidney injury.             Chief Complaint:   Hip fracture    History is obtained from the patient and electronic health record         History of Present Illness:   This patient is a 89 year old female who presents with hip fracture.  This occurred 6/23, after a fall.  She had surgical repair of her hip with ORIF.  She is 4 days postop.  She is doing quite well in this regard.  Her past medical history includes hypertension diabetes congestive heart failure atrial fibrillation mitral regurgitation.  She has a pacemaker in place.  Her last cardiac echo showed normal LV ejection fraction.  In the hospital which she recently underwent a thoracentesis.  She did get IV contrast for CT scan and she did get about a liter of IV fluids recently.   Lisinopril has been restarted.    She feels she is doing well she ate.  She has very little pain.  She is rehabbing and will be discharged to acute rehab.  She usually lives in an assisted living such scenario.    She has no history of renal disease or urologic problems.  She has never seen a nephrologist before.                Past Medical History:   No past medical history on file.          Past Surgical History:     Past Surgical History:   Procedure Laterality Date    OPEN REDUCTION INTERNAL FIXATION HIP NAILING Left 6/24/2024    Procedure: Open reduction internal fixation of left intertrochanteric fracture with short intramedullary nail;  Surgeon: Richy Dunbar MD;  Location:  OR               Social History:     Social History     Tobacco Use    Smoking status: Not on file    Smokeless tobacco: Not on file   Substance Use Topics    Alcohol use: Not on file             Family History:   No family history on file.          Allergies:   No Known Allergies          Medications:     Current Facility-Administered Medications   Medication Dose Route Frequency Provider Last Rate Last Admin    apixaban ANTICOAGULANT (ELIQUIS) tablet 2.5 mg  2.5 mg Oral BID Reese Lofton PA-C   2.5 mg at 06/27/24 2032    carvedilol (COREG) tablet 3.125 mg  3.125 mg Oral BID w/meals Kali Spaulding MD   3.125 mg at 06/28/24 0807    insulin aspart (NovoLOG) injection (RAPID ACTING)   Subcutaneous TID w/meals Louis Zuluaga DO   1 Units at 06/28/24 1238    insulin aspart (NovoLOG) injection (RAPID ACTING)  1-7 Units Subcutaneous TID AC Reese Lofton PA-C   2 Units at 06/28/24 1239    insulin aspart (NovoLOG) injection (RAPID ACTING)  1-5 Units Subcutaneous At Bedtime Reese Lofton PA-C   3 Units at 06/27/24 2135    multivitamin w/minerals (THERA-VIT-M) tablet 1 tablet  1 tablet Oral Daily Louis Zuluaga DO   1 tablet at 06/28/24 0807    polyethylene glycol (MIRALAX) Packet 17 g  17 g Oral Daily Rolly  MIKIE Leigh   17 g at 24 0810    senna-docusate (SENOKOT-S/PERICOLACE) 8.6-50 MG per tablet 1 tablet  1 tablet Oral BID Reese Lofton PA-C   1 tablet at 24 0811    simvastatin (ZOCOR) tablet 10 mg  10 mg Oral QPM Reese Lofton PA-C   10 mg at 24    sodium chloride (PF) 0.9% PF flush 3 mL  3 mL Intracatheter Q8H Reese Lofton PA-C   3 mL at 24 0231             Review of Systems:     She is taking p.o. well without nausea or vomiting but notes a poor appetite.  She is lost with assistance.  She is not having much pain from her surgical site on the left hip.  She denies shortness of breath or chest pain.            Physical Exam:   Vitals were reviewed  Temp: 97.8  F (36.6  C) Temp src: Oral BP: 124/63 Pulse: 76   Resp: 16 SpO2: 94 % O2 Device: None (Room air) Oxygen Delivery: 1 LPM  Blood pressure range: Systolic (24hrs), Av , Min:107 , Max:133     Blood pressure range: Diastolic (24hrs), Av, Min:57, Max:74      Intake/Output Summary (Last 24 hours) at 2024 1446  Last data filed at 2024 0900  Gross per 24 hour   Intake 100 ml   Output --   Net 100 ml     She is alert and resting comfortably in the chair  There is no distress obvious  She is a good medical historian  Lungs show clear anterior breath sounds   cardiac exam shows regular rhythm normal S1-S2 there is a holosystolic murmur at the apex, there is no jugular venous distention  Head shows no trauma the eyes are round and reactive light  Abdomen shows normal bowel sounds it is soft and nontender, there is no bruit  Extremities show the hands with no clubbing, cyanosis and good pulses, the lower extremities show no edema and good pulses, the left hip has a clean dressing in place         Data:   All laboratory data reviewed  Results for orders placed or performed during the hospital encounter of 24 (from the past 24 hour(s))   Sodium   Result Value Ref Range    Sodium 124 (L) 135 - 145 mmol/L   Nt  probnp inpatient   Result Value Ref Range    N terminal Pro BNP Inpatient 25,311 (H) 0 - 1,800 pg/mL   CT Chest w Contrast    Narrative    EXAM: CT CHEST W CONTRAST  LOCATION: [facility]  DATE: 6/27/2024    INDICATION: persistent hypoxia, possible right pleural effusion  COMPARISON: None.  TECHNIQUE: CT chest with IV contrast. Multiplanar reformats were obtained. Dose reduction techniques were used.    CONTRAST: 53 mL Isovue 370    FINDINGS:   LUNGS AND PLEURA: Large right pleural effusion with complete atelectasis of right lower lobe and moderate atelectasis of right middle lobe.  Tiny left pleural effusion with minimal atelectasis at left base.    MEDIASTINUM/AXILLAE: Mild cardiomegaly. Pacemaker present. No lymphadenopathy. Heavy calcifications of mitral annulus.    CORONARY ARTERY CALCIFICATION: Moderate.    UPPER ABDOMEN: Unremarkable.    MUSCULOSKELETAL: Unremarkable.      Impression    IMPRESSION:   1.  Large right pleural effusion with associated presence of atelectasis.  2.  Very small volume left pleural effusion.   Glucose by meter   Result Value Ref Range    GLUCOSE BY METER POCT 325 (H) 70 - 99 mg/dL   Glucose by meter   Result Value Ref Range    GLUCOSE BY METER POCT 341 (H) 70 - 99 mg/dL   Glucose by meter   Result Value Ref Range    GLUCOSE BY METER POCT 187 (H) 70 - 99 mg/dL   Glucose by meter   Result Value Ref Range    GLUCOSE BY METER POCT 220 (H) 70 - 99 mg/dL   Basic metabolic panel   Result Value Ref Range    Sodium 126 (L) 135 - 145 mmol/L    Potassium 4.9 3.4 - 5.3 mmol/L    Chloride 90 (L) 98 - 107 mmol/L    Carbon Dioxide (CO2) 25 22 - 29 mmol/L    Anion Gap 11 7 - 15 mmol/L    Urea Nitrogen 45.1 (H) 8.0 - 23.0 mg/dL    Creatinine 1.21 (H) 0.51 - 0.95 mg/dL    GFR Estimate 42 (L) >60 mL/min/1.73m2    Calcium 8.5 8.2 - 9.6 mg/dL    Glucose 229 (H) 70 - 99 mg/dL   CBC with platelets   Result Value Ref Range    WBC Count 9.6 4.0 - 11.0 10e3/uL    RBC Count 3.42 (L)  3.80 - 5.20 10e6/uL    Hemoglobin 9.5 (L) 11.7 - 15.7 g/dL    Hematocrit 29.6 (L) 35.0 - 47.0 %    MCV 87 78 - 100 fL    MCH 27.8 26.5 - 33.0 pg    MCHC 32.1 31.5 - 36.5 g/dL    RDW 14.8 10.0 - 15.0 %    Platelet Count 266 150 - 450 10e3/uL   Lactate Dehydrogenase   Result Value Ref Range    Lactate Dehydrogenase 246 0 - 250 U/L   Protein total   Result Value Ref Range    Protein Total 6.0 (L) 6.4 - 8.3 g/dL   Cell count with differential fluid    Narrative    The following orders were created for panel order Cell count with differential fluid.  Procedure                               Abnormality         Status                     ---------                               -----------         ------                     Cell Count Body Fluid[[id number]]                            Preliminary result         Differential Body Fluid[[id number]]                          Preliminary result           Please view results for these tests on the individual orders.   Glucose fluid   Result Value Ref Range    Glucose Fluid Source Pleural Cavity, Right     Glucose fluid 267 mg/dL    Narrative    No reference ranges have been established. This result should be interpreted in the context of the patient's clinical condition and compared to simultaneous measurement in the patient's blood. This is a lab developed test. It has not been cleared or approved by the FDA. FDA clearance is not required for clinical use.   Lactate dehydrogenase fluid   Result Value Ref Range    LD Fluid Source Pleural Cavity, Right     Lactate dehydrogenase fluid 106 U/L    Narrative    No reference ranges have been established. This result should be interpreted in the context of the patient's clinical condition and compared to simultaneous measurement in the patient's blood. This is a lab developed test. It has not been cleared or approved by the FDA. FDA clearance is not required for clinical use.   Protein fluid   Result Value Ref Range    Protein Fluid Source  Pleural Cavity, Right     Protein Total Fluid 2.1 g/dL    Narrative    No reference ranges have been established. This result should be interpreted in the context of the patient's clinical condition and compared to simultaneous measurement in the patient's blood. This is a lab developed test. It has not been cleared or approved by the FDA. FDA clearance is not required for clinical use.   Cell Count Body Fluid   Result Value Ref Range    Color Yellow Colorless, Yellow    Clarity Clear Clear    Cell Count Fluid Source Pleural Cavity, Right     Total Nucleated Cells 386 /uL    Narrative    No reference ranges have been established.  This result  should be interpreted in the context of the patient's clinical condition and   compared to simultaneous measurement in the patient's blood.         Differential Body Fluid   Result Value Ref Range    % Neutrophils 4 %    % Lymphocytes 58 %    % Monocyte/Macrophages 16 %    % Lining Cells 22 %   US Thoracentesis    Narrative    EXAM:  1. RIGHT THORACENTESIS  2. ULTRASOUND GUIDANCE  LOCATION: [facility]  DATE/TIME: 6/28/2024 11:11 AM    INDICATION: Pleural effusion.    PROCEDURE: Informed consent obtained. Time out performed. The chest  was prepped and draped in a sterile fashion. 10 mL of 1% lidocaine was  infused into local soft tissues. A 5 Taiwanese catheter system was  introduced into the pleural effusion under ultrasound guidance.    0.8 liters of clear fluid were removed and sent to lab if requested.    Patient tolerated procedure well.    Ultrasound images have been permanently captured for documentation.      Impression    IMPRESSION:  Status post ultrasound-guided right thoracentesis.    SHEREEN WALDROP MD         SYSTEM ID:  [id number]   Glucose by meter   Result Value Ref Range    GLUCOSE BY METER POCT 237 (H) 70 - 99 mg/dL

## 2024-06-28 NOTE — PLAN OF CARE
Goal Outcome Evaluation:        Date/Time: 6/28/24 2300-7:30     Trauma/Ortho     Diagnosis: Open reduction internal fixation of left intertrochanteric fracture with short intramedullary nail / Left Hip Fx    POD#: 4 ORIF  Mental Status: A&O forgetful  Activity/dangle: A2 GB Walker   Diet: Moderate Consistent Carb (60 g CHO per Meal)   Pain: Denied  Tiwari/Voiding: BSC  Tele/Restraints/Iso: NA  02/LDA: 1L NC  D/C Date: Pending    Other Info: PIV-SL, 02 on going with dressing CDI.

## 2024-06-28 NOTE — PLAN OF CARE
Date/Time: 06/28: [id number]    Trauma/Ortho/Medical (Choose one): Ortho    Diagnosis: (L) Hip ORIF  POD#:4  Mental Status: Oriented x 4 but can be forgetful  Activity/dangle: Up with one assist with GB/Walker  Diet: Mod CHO  Pain: Managed with prn tylenol  Tiwari/Voiding: Bathroom, Had a BM today  Tele/Restraints/Iso: None  02/LDA: RA. NS infusing at 100 ml/hour (1500 ml over 15 hrs )  D/C Date: TCU pending  Other Info: Had US guided thoracentesis and removed 800 ml fluid. Dressing on right lower back is CDI. Is on sliding scale coverage. Followed by nephrologist and cardiologist

## 2024-06-29 ENCOUNTER — APPOINTMENT (OUTPATIENT)
Dept: GENERAL RADIOLOGY | Facility: CLINIC | Age: 89
DRG: 480 | End: 2024-06-29
Attending: STUDENT IN AN ORGANIZED HEALTH CARE EDUCATION/TRAINING PROGRAM
Payer: COMMERCIAL

## 2024-06-29 LAB
ALBUMIN UR-MCNC: 20 MG/DL
ANION GAP SERPL CALCULATED.3IONS-SCNC: 10 MMOL/L (ref 7–15)
APPEARANCE UR: CLEAR
BILIRUB UR QL STRIP: NEGATIVE
BUN SERPL-MCNC: 52.1 MG/DL (ref 8–23)
BUN SERPL-MCNC: 54.2 MG/DL (ref 8–23)
BUN SERPL-MCNC: 58.7 MG/DL (ref 8–23)
CALCIUM SERPL-MCNC: 7.7 MG/DL (ref 8.2–9.6)
CALCIUM SERPL-MCNC: 7.9 MG/DL (ref 8.2–9.6)
CALCIUM SERPL-MCNC: 8.1 MG/DL (ref 8.2–9.6)
CHLORIDE SERPL-SCNC: 88 MMOL/L (ref 98–107)
CHLORIDE SERPL-SCNC: 88 MMOL/L (ref 98–107)
CHLORIDE SERPL-SCNC: 91 MMOL/L (ref 98–107)
COLOR UR AUTO: YELLOW
CREAT SERPL-MCNC: 1.35 MG/DL (ref 0.51–0.95)
CREAT SERPL-MCNC: 1.37 MG/DL (ref 0.51–0.95)
CREAT SERPL-MCNC: 1.53 MG/DL (ref 0.51–0.95)
DEPRECATED HCO3 PLAS-SCNC: 22 MMOL/L (ref 22–29)
EGFRCR SERPLBLD CKD-EPI 2021: 32 ML/MIN/1.73M2
EGFRCR SERPLBLD CKD-EPI 2021: 37 ML/MIN/1.73M2
EGFRCR SERPLBLD CKD-EPI 2021: 37 ML/MIN/1.73M2
GLUCOSE BLDC GLUCOMTR-MCNC: 245 MG/DL (ref 70–99)
GLUCOSE BLDC GLUCOMTR-MCNC: 246 MG/DL (ref 70–99)
GLUCOSE BLDC GLUCOMTR-MCNC: 252 MG/DL (ref 70–99)
GLUCOSE BLDC GLUCOMTR-MCNC: 252 MG/DL (ref 70–99)
GLUCOSE BLDC GLUCOMTR-MCNC: 292 MG/DL (ref 70–99)
GLUCOSE BLDC GLUCOMTR-MCNC: 293 MG/DL (ref 70–99)
GLUCOSE BLDC GLUCOMTR-MCNC: 331 MG/DL (ref 70–99)
GLUCOSE BLDC GLUCOMTR-MCNC: 352 MG/DL (ref 70–99)
GLUCOSE SERPL-MCNC: 251 MG/DL (ref 70–99)
GLUCOSE SERPL-MCNC: 254 MG/DL (ref 70–99)
GLUCOSE SERPL-MCNC: 315 MG/DL (ref 70–99)
GLUCOSE UR STRIP-MCNC: NEGATIVE MG/DL
HGB UR QL STRIP: NEGATIVE
HYALINE CASTS: 4 /LPF
KETONES UR STRIP-MCNC: NEGATIVE MG/DL
LEUKOCYTE ESTERASE UR QL STRIP: NEGATIVE
MAGNESIUM SERPL-MCNC: 2 MG/DL (ref 1.7–2.3)
MUCOUS THREADS #/AREA URNS LPF: PRESENT /LPF
NITRATE UR QL: NEGATIVE
PH UR STRIP: 5.5 [PH] (ref 5–7)
PHOSPHATE SERPL-MCNC: 2.8 MG/DL (ref 2.5–4.5)
POTASSIUM SERPL-SCNC: 5.2 MMOL/L (ref 3.4–5.3)
POTASSIUM SERPL-SCNC: 5.5 MMOL/L (ref 3.4–5.3)
POTASSIUM SERPL-SCNC: 5.8 MMOL/L (ref 3.4–5.3)
POTASSIUM SERPL-SCNC: 5.8 MMOL/L (ref 3.4–5.3)
RBC URINE: 1 /HPF
SODIUM SERPL-SCNC: 120 MMOL/L (ref 135–145)
SODIUM SERPL-SCNC: 120 MMOL/L (ref 135–145)
SODIUM SERPL-SCNC: 123 MMOL/L (ref 135–145)
SODIUM UR-SCNC: <20 MMOL/L
SP GR UR STRIP: 1.03 (ref 1–1.03)
SQUAMOUS EPITHELIAL: 2 /HPF
T4 FREE SERPL-MCNC: 1.22 NG/DL (ref 0.9–1.7)
TSH SERPL DL<=0.005 MIU/L-ACNC: 4.86 UIU/ML (ref 0.3–4.2)
UROBILINOGEN UR STRIP-MCNC: NORMAL MG/DL
WBC URINE: 2 /HPF

## 2024-06-29 PROCEDURE — 84439 ASSAY OF FREE THYROXINE: CPT | Performed by: INTERNAL MEDICINE

## 2024-06-29 PROCEDURE — 250N000013 HC RX MED GY IP 250 OP 250 PS 637

## 2024-06-29 PROCEDURE — 71046 X-RAY EXAM CHEST 2 VIEWS: CPT

## 2024-06-29 PROCEDURE — 80048 BASIC METABOLIC PNL TOTAL CA: CPT | Performed by: INTERNAL MEDICINE

## 2024-06-29 PROCEDURE — 36415 COLL VENOUS BLD VENIPUNCTURE: CPT | Performed by: INTERNAL MEDICINE

## 2024-06-29 PROCEDURE — 36415 COLL VENOUS BLD VENIPUNCTURE: CPT | Performed by: HOSPITALIST

## 2024-06-29 PROCEDURE — 36415 COLL VENOUS BLD VENIPUNCTURE: CPT

## 2024-06-29 PROCEDURE — 250N000009 HC RX 250: Performed by: INTERNAL MEDICINE

## 2024-06-29 PROCEDURE — 250N000013 HC RX MED GY IP 250 OP 250 PS 637: Performed by: STUDENT IN AN ORGANIZED HEALTH CARE EDUCATION/TRAINING PROGRAM

## 2024-06-29 PROCEDURE — 81001 URINALYSIS AUTO W/SCOPE: CPT | Performed by: INTERNAL MEDICINE

## 2024-06-29 PROCEDURE — 83735 ASSAY OF MAGNESIUM: CPT | Performed by: INTERNAL MEDICINE

## 2024-06-29 PROCEDURE — 99232 SBSQ HOSP IP/OBS MODERATE 35: CPT | Performed by: INTERNAL MEDICINE

## 2024-06-29 PROCEDURE — 99232 SBSQ HOSP IP/OBS MODERATE 35: CPT | Performed by: HOSPITALIST

## 2024-06-29 PROCEDURE — 80048 BASIC METABOLIC PNL TOTAL CA: CPT

## 2024-06-29 PROCEDURE — 250N000013 HC RX MED GY IP 250 OP 250 PS 637: Performed by: INTERNAL MEDICINE

## 2024-06-29 PROCEDURE — 84300 ASSAY OF URINE SODIUM: CPT | Performed by: INTERNAL MEDICINE

## 2024-06-29 PROCEDURE — 120N000001 HC R&B MED SURG/OB

## 2024-06-29 PROCEDURE — 84100 ASSAY OF PHOSPHORUS: CPT | Performed by: INTERNAL MEDICINE

## 2024-06-29 PROCEDURE — 84443 ASSAY THYROID STIM HORMONE: CPT | Performed by: INTERNAL MEDICINE

## 2024-06-29 PROCEDURE — 258N000003 HC RX IP 258 OP 636: Performed by: INTERNAL MEDICINE

## 2024-06-29 PROCEDURE — 250N000012 HC RX MED GY IP 250 OP 636 PS 637: Performed by: INTERNAL MEDICINE

## 2024-06-29 RX ORDER — DEXTROSE MONOHYDRATE 25 G/50ML
25-50 INJECTION, SOLUTION INTRAVENOUS
Status: CANCELLED | OUTPATIENT
Start: 2024-06-29

## 2024-06-29 RX ORDER — DEXTROSE MONOHYDRATE 100 MG/ML
INJECTION, SOLUTION INTRAVENOUS CONTINUOUS PRN
Status: CANCELLED | OUTPATIENT
Start: 2024-06-29

## 2024-06-29 RX ORDER — NICOTINE POLACRILEX 4 MG
15-30 LOZENGE BUCCAL
Status: CANCELLED | OUTPATIENT
Start: 2024-06-29

## 2024-06-29 RX ADMIN — INSULIN ASPART 2 UNITS: 100 INJECTION, SOLUTION INTRAVENOUS; SUBCUTANEOUS at 23:15

## 2024-06-29 RX ADMIN — CARVEDILOL 6.25 MG: 3.12 TABLET, FILM COATED ORAL at 17:25

## 2024-06-29 RX ADMIN — APIXABAN 2.5 MG: 2.5 TABLET, FILM COATED ORAL at 08:15

## 2024-06-29 RX ADMIN — OXYCODONE HYDROCHLORIDE 10 MG: 5 TABLET ORAL at 03:53

## 2024-06-29 RX ADMIN — SODIUM CHLORIDE 10 UNITS: 9 INJECTION, SOLUTION INTRAVENOUS at 17:22

## 2024-06-29 RX ADMIN — ACETAMINOPHEN 650 MG: 325 TABLET, FILM COATED ORAL at 13:33

## 2024-06-29 RX ADMIN — OXYCODONE HYDROCHLORIDE 5 MG: 5 TABLET ORAL at 16:11

## 2024-06-29 RX ADMIN — OXYCODONE HYDROCHLORIDE 5 MG: 5 TABLET ORAL at 20:51

## 2024-06-29 RX ADMIN — METHOCARBAMOL 250 MG: 500 TABLET ORAL at 13:33

## 2024-06-29 RX ADMIN — ACETAMINOPHEN 650 MG: 325 TABLET, FILM COATED ORAL at 03:53

## 2024-06-29 RX ADMIN — SENNOSIDES AND DOCUSATE SODIUM 1 TABLET: 50; 8.6 TABLET ORAL at 08:15

## 2024-06-29 RX ADMIN — SENNOSIDES AND DOCUSATE SODIUM 1 TABLET: 50; 8.6 TABLET ORAL at 20:50

## 2024-06-29 RX ADMIN — CARVEDILOL 6.25 MG: 3.12 TABLET, FILM COATED ORAL at 08:15

## 2024-06-29 RX ADMIN — SIMVASTATIN 10 MG: 10 TABLET, FILM COATED ORAL at 20:50

## 2024-06-29 RX ADMIN — Medication 1 TABLET: at 08:15

## 2024-06-29 RX ADMIN — APIXABAN 2.5 MG: 2.5 TABLET, FILM COATED ORAL at 20:50

## 2024-06-29 RX ADMIN — SODIUM BICARBONATE 50 MEQ: 84 INJECTION INTRAVENOUS at 17:25

## 2024-06-29 ASSESSMENT — ACTIVITIES OF DAILY LIVING (ADL)
ADLS_ACUITY_SCORE: 35
ADLS_ACUITY_SCORE: 36
ADLS_ACUITY_SCORE: 33
ADLS_ACUITY_SCORE: 35
ADLS_ACUITY_SCORE: 33
ADLS_ACUITY_SCORE: 36
ADLS_ACUITY_SCORE: 35
ADLS_ACUITY_SCORE: 33
ADLS_ACUITY_SCORE: 33
ADLS_ACUITY_SCORE: 35
ADLS_ACUITY_SCORE: 35
ADLS_ACUITY_SCORE: 33
ADLS_ACUITY_SCORE: 39
ADLS_ACUITY_SCORE: 35
ADLS_ACUITY_SCORE: 35
ADLS_ACUITY_SCORE: 33
ADLS_ACUITY_SCORE: 33
ADLS_ACUITY_SCORE: 36
ADLS_ACUITY_SCORE: 33
ADLS_ACUITY_SCORE: 35
ADLS_ACUITY_SCORE: 35

## 2024-06-29 NOTE — PROVIDER NOTIFICATION
MD Notification    Notified Person: MD    Notified Person Name: Dr. Adan White    Notification Date/Time: 6/29/24 @ 0318    Notification Interaction: Maciej    Purpose of Notification:  :FYI- patient BG at 10pm was 374, 4 units of insulin given. At 2am, was 293.    Orders Received:     Comments: One time dose of insulin.

## 2024-06-29 NOTE — PROGRESS NOTES
Date/Time: 6/28/24 1900-06/29/24 0700    Diagnosis:  POD#:4 Left Hip ORIF  Mechanical fall.   Mental Status:    Activity/dangle: AOx4  Diet:Mod Carb(60g CHO)  Pain:denies; given oxy and tylenol x1 for movement  Tiwari/Voiding:Not voiding overnight, bladder scan 294 at 0400.   Tele/Restraints/Iso:n/a  02/LDA:VSS on RA; sometimes dips to high to mid 80s while sleeping.   D/C Date: Discharge to [facility], they are ready. Pending medical clearance.   Other Info:  On thinners.  BG check. 374(then given 4 units) 293(then given3 units 1xdose)265

## 2024-06-29 NOTE — PROGRESS NOTES
VSS, CMS intact. Up with 1-2 and walker. Pain managed with oxycodone, Robaxin and tylenol. Low sodium and high potassium level. IV sodium bicarbonate and insuline given at 1722. Order to check BG Q30 minutes for 2 hours. Sodium check every 6 hours. A&OX4.

## 2024-06-29 NOTE — PROVIDER NOTIFICATION
Dr. Seymour paged with the following message:   Message: Please see pt's lab result. K+ is 5.5 amd sodium is 123. TY!   Addendum at 0906: Per Dr. Seymour: I saw that Nephrology is following, and I will have them address it

## 2024-06-29 NOTE — PROGRESS NOTES
[facility]    Medicine Progress Note - Hospitalist Service    Date of Admission:  6/23/2024    Assessment & Plan     Lisa Meraz is a 89 year old female who presents to the emergency room after mechanical fall earlier today. She was attempting to make her bed when she fell. Denies any loss of consciousness, but does state that she hit her head. Found to have left femur fracture. CTH and CT of cervical spine showed no trauma. Anticipate medically stable for discharge in 1-2 days.     Mechanical Fall  CHT  Left IT fracture of proximal femur s/p ORIF left intertrochanteric fracture with short intramedullary nail 6/24/24  -  dilaudid prn for pain, schedule tylenol   - Bowel regimen   - Orthopedics following, recommendations appreciated  - Pain control and VTE prophylaxis per orthopedic surgery, now back on PTA Apixiban 2.5 mg bid  - PT/OT, likely TCU placement     Hyperkalemia,   Hyponatremia  ANDREA versus CKD   Cachexia     Creatinine trend since admission 1.07 > 1.10 > 1.17 > 1.20 > 1.16 >1.37. Na trend 133 > 132 > 131 > 130 > 125 > 124 > 126 >123.    K+ is 55  After an initial attempt to diurese patient with 20 mg of IV lasix (due to hypoxia) sodium decreased from 130  to 125.   Urine sodium < 20, Urine osm 500, serum osm isotonic. Suspecting hypovolemic hyponatremia.   - Appreciate nephrology assistance, rehydrating with NS 6/28, if Na normalizing on 6/29 patient would be medically stable for discharge to TCU  - Daily BMP  - Nutrition consult placed 6/26 6/29 Potasium increased to 5.5, Creatinine is uptrending to 1.37 and sodium is drifting down to 123  Deferred to nephrology    Post-operative Hypoxia, resolved  Right Transudative Pleural effusion s/p US thoracentesis 6/28  Patient has been on continuous oximetry for >24 hours and we have not been able to wean off of oxygen. She continues to require 1 lpm. She consistently drops to the mid 80's on room air. Chest XR showed right lower  lobe infiltrate, versus effusion, versus atelectasis. Significantly reduced breath sounds at right lung base. No wheezes, crackles, or rales. She is on DOAC but at lowered dose. Not tachycardic. Currently do not suspect PE. WBC now slightly elevated at 11.6 but patient without infectious symptoms. Chest CT showed large right pleural effusion. S/p US thoracentesis on 6/28. Findings consistent with transudate effusion, perhaps in setting of atelectasis after surgery. BNP is elevated however patient appears more volume down on exam so do not suspect acute HFrEF.  - Repeat chest XR ordered for 6/29, is pending at the time of this note    Anxiety  Apparently anxious on admission. Calm and cooperative this morning. She reports prn medication for sleep (seroquel) has been helping a lot.   - Seroquel 6.25 mg BID prn and 12.5 mg at bedtime prn.  - Avoid anticholinergics     Hypertension  Intermittent atrial fibrillation  History of paroxysmal complete heart block s/p dual chamber permament pacemaker 5/10/21  H/o of Severe Mitral regurgitation  Denies any chest pain, palpitations or anginal equivalents on admission. She has a history of nonobstructive carotid artery disease, pulmonary hypertension, mild aortic valve stenosis, severe mitral valve regurgitation with a flail P2 leaflet. She follows with Dr. Kenney at [facility]. She is due for outpatient cardiology follow up and repeat TTE which is being checked every 6 months. EKG with ventricularly paced rhythm. Repeat TTE on 6/27 due to post-operative hypoxia showing new WMA and decrease in EF. However patient    - Now back on DOAC s/p surgery   - Cardiology consulted due to reduced EF and new WMA seen on TTE, recommending GDMT and outpatient cardiology follow up for further risk stratification  - Started on low dose lisinopril and COREG, recommend continuing these on discharge   - Holding PTA chlorthalidone and benazepril given normotension, likely to hold  on discharge    DM Type II  Patient is on metformin. Has been hyperglycemic during hospitalization. I think this is related to Glucerna which she has been taking to try to gain weight.    - Mod CHO diet, added carb coverage 1 unit for every 15 carbs on 6/27 given hyperglycemia  - Discussed holding glucerna on 6/28 with patient and daughter   - Sliding scale insulin  - Hold PTA metformin,       Hyperlipidemia  - Continue statin     Constipation  - Bowel regimen    Normocytic anemia  Hemoglobin on admission 10.1, currently 9.3   - Continue to monitor           Diet: Moderate Consistent Carb (60 g CHO per Meal) Diet  Snacks/Supplements Adult: Glucerna; With Meals  Fluid restriction 1200 ML FLUID      DVT Prophylaxis: Pneumatic Compression Devices, PTA DOAC  Tiwari Catheter: Not present  Lines: None     Cardiac Monitoring: None  Code Status: Full Code      Clinically Significant Risk Factors        # Hyperkalemia: Highest K = 5.5 mmol/L in last 2 days, will monitor as appropriate  # Hyponatremia: Lowest Na = 123 mmol/L in last 2 days, will monitor as appropriate           # Acute heart failure with reduced ejection fraction: last echo with EF <40% and receiving IV diuretics                # Severe Malnutrition: based on nutrition assessment           Disposition Plan     Medically Ready for Discharge: Anticipated Tomorrow         Jaylen Seymour MD  Hospitalist Service  [facility]  Securely message with [employer] (more info)  Text page via [employer] Paging/Directory   ______________________________________________________________________    Interval History   - Patient slept well overnight, pain is controlled with the current pain regimen  - No longer requiring oxygen after US thoracentesis  - No other acute concerns   - Updated patient family at bedside     Physical Exam   Vital Signs: Temp: 97.7  F (36.5  C) Temp src: Oral BP: 122/62 Pulse: 69   Resp: 16 SpO2: 97 % O2 Device: None (Room  air)    Weight: 121 lbs 4.05 oz    Constitutional: alert   appears deconditioned  Respiratory: Adequate  lair entry, no wheezing  Cardiovascular: S1 S2 head , soft systolic murmur noted    Medical Decision Making       58 MINUTES SPENT BY ME on the date of service doing chart review, history, exam, documentation & further activities per the note.      Data   ------------------------- PAST 24 HR DATA REVIEWED -----------------------------------------------    I have personally reviewed the following data over the past 24 hrs:    N/A  \   N/A   / N/A     123 (L) 91 (L) 52.1 (H) /  252 (H)   5.5 (H) 22 1.37 (H) \     TSH: 4.86 (H) T4: 1.22 A1C: N/A       Imaging results reviewed over the past 24 hrs:   No results found for this or any previous visit (from the past 24 hour(s)).

## 2024-06-29 NOTE — PROGRESS NOTES
[facility]     Renal Progress Note       SHORTHAND KEY FOR MY NOTES:  c = with, s = without, p = after, a = before, x = except, asx = asymptomatic, tx = transplant or treatment, sx = symptoms or symptomatic, cx = canceled or culture, rxn = reaction, yday = yesterday, nl = normal, abx = antibiotics, fxn = function, dx = diagnosis, dz = disease, m/h = melena/hematochezia, c/d/l/ha = cramping/dizziness/lightheadedness/headache, d/c = discharge or diarrhea/constipation, f/c/n/v = fevers/chills/nausea/vomiting, cp/sob = chest pain/shortness of breath, tbv = total body volume, rxn = reaction, tdc = tunneled dialysis catheter, pta = prior to admission, hd = hemodialysis, pd = peritoneal dialysis, hhd = home hemodialysis, edw = estimated dry wt         Assessment/Plan:     1.  Severe hyponatremia.  Pt's Na corrects up for higher sugars, but is lower again despite fluids.  She seems dry on exam and urine studies are c/w that, too.  However, the pain is likely causing an ADH release leading to SIADH.  She is fairly alert right now and isn't having any major sx of hypoNa.  Explained that fixing Na properly takes time to avoid rapid correction.  A.  If the Na is lower at 1800 then we will start 2% saline @ 50 ml/h since that will help c both volume and SIADH.  B.  Fluid restriction 1200 ml every day.  C.  Encouraged pt to think of food as medicine and eat something at each meal.  D.  Check Na q 6h.  E.  Salt tabs are large and she has trouble c large pills, so if we need to supplement we will use UreNa packets.    2.  Severe hyperkalemia.  Pt's K is rising.  Part of this may be due to hypovolemia causing reduced distal delivery of Na.  She could also be developing a relative acidosis (CO2 29 to 22).  A.  Will give bicarb + insulin now.  Will not give glc since sugars are already high.  B.  Check K again at 1800.  C.  Continue med mgmt prn.    3.  ANDREA/CKD III.  Pt's cr is a bit higher than baseline.   It's bc she is dry.  ACEi was stopped.    4.  L femur fx s/p ORIF.  Pt has some continued discomfort.  A.  Pain control prn.    5.  DM2.  Pt's sugars are high.  She isn't eating much and likes sweets.  The metformin has been held.  A.  Ok to eat whatever she wants.  B.  If she needs a more aggressive sliding scale, please call Hosp team.  C.  Agree c holding metformin.    6.  FEN.  Other than K and Na, other electrolytes are ok.  A.  Continue same diet.    Case d/w Chloé Otoole/asa RNs, pt/family.        Interval History:     Pt denies any n/v, but is having pain and discomfort in the L leg.  No cp/sob.  She doesn't eat a lot and is restricting her water intake.  Family states she seems worse than yday, though pt disagrees.          Medications and Allergies:     Current Facility-Administered Medications   Medication Dose Route Frequency Provider Last Rate Last Admin    apixaban ANTICOAGULANT (ELIQUIS) tablet 2.5 mg  2.5 mg Oral BID Reese Lofton PA-C   2.5 mg at 06/29/24 0815    carvedilol (COREG) tablet 6.25 mg  6.25 mg Oral BID w/meals Abbe Helton MD   6.25 mg at 06/29/24 0815    insulin aspart (NovoLOG) injection (RAPID ACTING)   Subcutaneous TID w/meals Louis Zuluaga DO   2 Units at 06/29/24 0816    insulin aspart (NovoLOG) injection (RAPID ACTING)  1-7 Units Subcutaneous TID AC Reese Lofton PA-C   3 Units at 06/29/24 0815    insulin aspart (NovoLOG) injection (RAPID ACTING)  1-5 Units Subcutaneous At Bedtime Reese Lofton PA-C   4 Units at 06/28/24 2234    insulin regular 1 unit/mL injection 10 Units  10 Units Intravenous Once Abdoulaye Mcqueen MD        multivitamin w/minerals (THERA-VIT-M) tablet 1 tablet  1 tablet Oral Daily Louis Zuluaga DO   1 tablet at 06/29/24 0815    polyethylene glycol (MIRALAX) Packet 17 g  17 g Oral Daily Reese Lofton PA-C   17 g at 06/27/24 0810    senna-docusate (SENOKOT-S/PERICOLACE) 8.6-50 MG per tablet 1 tablet  1 tablet Oral BID  Reese Lofton PA-C   1 tablet at 06/29/24 0815    simvastatin (ZOCOR) tablet 10 mg  10 mg Oral QPM Reese Lofton PA-C   10 mg at 06/28/24 2030    sodium bicarbonate 8.4 % injection 50 mEq  50 mEq Intravenous Once Abdoulaye Mcqueen MD        sodium chloride (PF) 0.9% PF flush 3 mL  3 mL Intracatheter Q8H Reese Lofton PA-C   3 mL at 06/29/24 1613     No Known Allergies       Physical Exam:     Vitals were reviewed     , Blood pressure 122/62, pulse 69, temperature 97.7  F (36.5  C), temperature source Oral, resp. rate 16, weight 55 kg (121 lb 4.1 oz), SpO2 97%.  Wt Readings from Last 3 Encounters:   06/29/24 55 kg (121 lb 4.1 oz)     Intake/Output Summary (Last 24 hours) at 6/29/2024 1626  Last data filed at 6/29/2024 1500  Gross per 24 hour   Intake 220 ml   Output 100 ml   Net 120 ml     GENERAL APPEARANCE: pleasant, NAD, alert  RESP: CTA B c good efforts  CV: RRR, nl S1/S2   ABDOMEN: s/nt/nd  EXTREMITIES/SKIN: no ble edema         Data:     CBC RESULTS:     Recent Labs   Lab 06/28/24  0714 06/27/24  0813 06/26/24  0744 06/25/24  0657 06/24/24  0733 06/23/24  1046   WBC 9.6 11.6*  --  8.6 9.5 7.1   RBC 3.42* 3.45*  --  3.49* 3.30* 3.63*   HGB 9.5* 9.0* 9.3* 9.6* 8.8* 10.1*   HCT 29.6* 29.5*  --  30.6* 28.3* 31.9*    235  --  207 196 192     Basic Metabolic Panel:  Recent Labs   Lab 06/29/24  1455 06/29/24  1325 06/29/24  0735 06/29/24  0733 06/29/24  0544 06/29/24  0159 06/28/24  1142 06/28/24  0714 06/27/24  1721 06/27/24  1507 06/27/24  1145 06/27/24  0813 06/26/24  0809 06/26/24  0744 06/25/24  0724 06/25/24  0657   *  --   --  123*  --   --   --  126*  --  124*  --  125*  --  130*  --  131*   POTASSIUM 5.8*  5.8*  --   --  5.5*  --   --   --  4.9  --   --   --  5.0  --  4.6  --  5.1   CHLORIDE 88*  --   --  91*  --   --   --  90*  --   --   --  88*  --  93*  --  92*   CO2 22  --   --  22  --   --   --  25  --   --   --  28  --  29  --  29   BUN 54.2*  --   --  52.1*  --   --   --  45.1*  --    --   --  40.5*  --  40.3*  --  43.4*   CR 1.35*  --   --  1.37*  --   --   --  1.21*  --   --   --  1.16*  --  1.20*  --  1.17*   * 252* 252* 251* 246* 293*   < > 229*   < >  --    < > 247*   < > 179*   < > 182*   JHONATHAN 8.1*  --   --  7.9*  --   --   --  8.5  --   --   --  8.9  --  8.9  --  10.2*    < > = values in this interval not displayed.     INR  Recent Labs   Lab 06/23/24  1046   INR 1.01      Attestation:   I have reviewed today's relevant vital signs, notes, medications, labs and imaging.    Abdoulaye Mcqueen MD  [facility] - Nephrology  [phone]

## 2024-06-29 NOTE — PROVIDER NOTIFICATION
Paged on call nephrologist, , to look at pt's morning lab result and pt doesn't have any IV fluid infusing. Awaiting for a call back.  Addendum at 1100: TORB received from Dr. Mcqueen for Fluid restriction of 1200 ml/day.

## 2024-06-30 LAB
ANION GAP SERPL CALCULATED.3IONS-SCNC: 10 MMOL/L (ref 7–15)
ANION GAP SERPL CALCULATED.3IONS-SCNC: 11 MMOL/L (ref 7–15)
ANION GAP SERPL CALCULATED.3IONS-SCNC: 12 MMOL/L (ref 7–15)
BASE EXCESS BLDV CALC-SCNC: 5.2 MMOL/L (ref -3–3)
BUN SERPL-MCNC: 55.7 MG/DL (ref 8–23)
BUN SERPL-MCNC: 56.3 MG/DL (ref 8–23)
BUN SERPL-MCNC: 56.7 MG/DL (ref 8–23)
CALCIUM SERPL-MCNC: 7.8 MG/DL (ref 8.2–9.6)
CALCIUM SERPL-MCNC: 7.9 MG/DL (ref 8.2–9.6)
CALCIUM SERPL-MCNC: 8 MG/DL (ref 8.2–9.6)
CHLORIDE SERPL-SCNC: 89 MMOL/L (ref 98–107)
CHLORIDE SERPL-SCNC: 91 MMOL/L (ref 98–107)
CHLORIDE SERPL-SCNC: 92 MMOL/L (ref 98–107)
CREAT SERPL-MCNC: 1.41 MG/DL (ref 0.51–0.95)
CREAT SERPL-MCNC: 1.43 MG/DL (ref 0.51–0.95)
CREAT SERPL-MCNC: 1.44 MG/DL (ref 0.51–0.95)
DEPRECATED HCO3 PLAS-SCNC: 19 MMOL/L (ref 22–29)
DEPRECATED HCO3 PLAS-SCNC: 21 MMOL/L (ref 22–29)
DEPRECATED HCO3 PLAS-SCNC: 22 MMOL/L (ref 22–29)
EGFRCR SERPLBLD CKD-EPI 2021: 34 ML/MIN/1.73M2
EGFRCR SERPLBLD CKD-EPI 2021: 35 ML/MIN/1.73M2
EGFRCR SERPLBLD CKD-EPI 2021: 35 ML/MIN/1.73M2
ERYTHROCYTE [DISTWIDTH] IN BLOOD BY AUTOMATED COUNT: 14.6 % (ref 10–15)
GLUCOSE BLDC GLUCOMTR-MCNC: 116 MG/DL (ref 70–99)
GLUCOSE BLDC GLUCOMTR-MCNC: 118 MG/DL (ref 70–99)
GLUCOSE BLDC GLUCOMTR-MCNC: 164 MG/DL (ref 70–99)
GLUCOSE BLDC GLUCOMTR-MCNC: 166 MG/DL (ref 70–99)
GLUCOSE BLDC GLUCOMTR-MCNC: 180 MG/DL (ref 70–99)
GLUCOSE BLDC GLUCOMTR-MCNC: 204 MG/DL (ref 70–99)
GLUCOSE BLDC GLUCOMTR-MCNC: 217 MG/DL (ref 70–99)
GLUCOSE BLDC GLUCOMTR-MCNC: 240 MG/DL (ref 70–99)
GLUCOSE BLDC GLUCOMTR-MCNC: 272 MG/DL (ref 70–99)
GLUCOSE BLDC GLUCOMTR-MCNC: 87 MG/DL (ref 70–99)
GLUCOSE SERPL-MCNC: 156 MG/DL (ref 70–99)
GLUCOSE SERPL-MCNC: 247 MG/DL (ref 70–99)
GLUCOSE SERPL-MCNC: 333 MG/DL (ref 70–99)
HCO3 BLDV-SCNC: 32 MMOL/L (ref 21–28)
HCT VFR BLD AUTO: 28.5 % (ref 35–47)
HGB BLD-MCNC: 9.1 G/DL (ref 11.7–15.7)
MCH RBC QN AUTO: 27.7 PG (ref 26.5–33)
MCHC RBC AUTO-ENTMCNC: 31.9 G/DL (ref 31.5–36.5)
MCV RBC AUTO: 87 FL (ref 78–100)
O2/TOTAL GAS SETTING VFR VENT: 21 %
OXYHGB MFR BLDV: 30 % (ref 70–75)
PCO2 BLDV: 55 MM HG (ref 40–50)
PH BLDV: 7.37 [PH] (ref 7.32–7.43)
PLATELET # BLD AUTO: 303 10E3/UL (ref 150–450)
PO2 BLDV: 21 MM HG (ref 25–47)
POTASSIUM SERPL-SCNC: 5.6 MMOL/L (ref 3.4–5.3)
POTASSIUM SERPL-SCNC: 5.9 MMOL/L (ref 3.4–5.3)
POTASSIUM SERPL-SCNC: 6.3 MMOL/L (ref 3.4–5.3)
RBC # BLD AUTO: 3.29 10E6/UL (ref 3.8–5.2)
SAO2 % BLDV: 30.5 % (ref 70–75)
SODIUM SERPL-SCNC: 121 MMOL/L (ref 135–145)
SODIUM SERPL-SCNC: 122 MMOL/L (ref 135–145)
SODIUM SERPL-SCNC: 122 MMOL/L (ref 135–145)
SODIUM SERPL-SCNC: 124 MMOL/L (ref 135–145)
WBC # BLD AUTO: 10.6 10E3/UL (ref 4–11)

## 2024-06-30 PROCEDURE — 250N000013 HC RX MED GY IP 250 OP 250 PS 637: Performed by: STUDENT IN AN ORGANIZED HEALTH CARE EDUCATION/TRAINING PROGRAM

## 2024-06-30 PROCEDURE — 250N000013 HC RX MED GY IP 250 OP 250 PS 637: Performed by: INTERNAL MEDICINE

## 2024-06-30 PROCEDURE — 250N000012 HC RX MED GY IP 250 OP 636 PS 637: Performed by: INTERNAL MEDICINE

## 2024-06-30 PROCEDURE — 80048 BASIC METABOLIC PNL TOTAL CA: CPT | Performed by: INTERNAL MEDICINE

## 2024-06-30 PROCEDURE — 36415 COLL VENOUS BLD VENIPUNCTURE: CPT | Performed by: INTERNAL MEDICINE

## 2024-06-30 PROCEDURE — 250N000013 HC RX MED GY IP 250 OP 250 PS 637

## 2024-06-30 PROCEDURE — 120N000001 HC R&B MED SURG/OB

## 2024-06-30 PROCEDURE — 99232 SBSQ HOSP IP/OBS MODERATE 35: CPT | Performed by: INTERNAL MEDICINE

## 2024-06-30 PROCEDURE — 85027 COMPLETE CBC AUTOMATED: CPT | Performed by: HOSPITALIST

## 2024-06-30 PROCEDURE — 258N000003 HC RX IP 258 OP 636: Performed by: INTERNAL MEDICINE

## 2024-06-30 PROCEDURE — 250N000009 HC RX 250: Performed by: HOSPITALIST

## 2024-06-30 PROCEDURE — 82805 BLOOD GASES W/O2 SATURATION: CPT | Performed by: INTERNAL MEDICINE

## 2024-06-30 PROCEDURE — 99232 SBSQ HOSP IP/OBS MODERATE 35: CPT | Performed by: HOSPITALIST

## 2024-06-30 PROCEDURE — 80048 BASIC METABOLIC PNL TOTAL CA: CPT | Performed by: HOSPITALIST

## 2024-06-30 PROCEDURE — 250N000009 HC RX 250: Performed by: INTERNAL MEDICINE

## 2024-06-30 RX ORDER — NICOTINE POLACRILEX 4 MG
15-30 LOZENGE BUCCAL
Status: DISCONTINUED | OUTPATIENT
Start: 2024-06-30 | End: 2024-06-30

## 2024-06-30 RX ORDER — DEXTROSE MONOHYDRATE 25 G/50ML
25-50 INJECTION, SOLUTION INTRAVENOUS
Status: DISCONTINUED | OUTPATIENT
Start: 2024-06-30 | End: 2024-06-30

## 2024-06-30 RX ADMIN — QUETIAPINE 12.5 MG: 25 TABLET, FILM COATED ORAL at 23:43

## 2024-06-30 RX ADMIN — SODIUM BICARBONATE 100 MEQ: 84 INJECTION INTRAVENOUS at 17:29

## 2024-06-30 RX ADMIN — Medication 1 TABLET: at 08:13

## 2024-06-30 RX ADMIN — VASOPRESSIN: 20 INJECTION, SOLUTION INTRAVENOUS at 10:45

## 2024-06-30 RX ADMIN — OXYCODONE HYDROCHLORIDE 5 MG: 5 TABLET ORAL at 10:44

## 2024-06-30 RX ADMIN — POLYETHYLENE GLYCOL 3350 17 G: 17 POWDER, FOR SOLUTION ORAL at 08:13

## 2024-06-30 RX ADMIN — OXYCODONE HYDROCHLORIDE 5 MG: 5 TABLET ORAL at 23:42

## 2024-06-30 RX ADMIN — SODIUM CHLORIDE: 4 INJECTION, SOLUTION, CONCENTRATE INTRAVENOUS at 21:32

## 2024-06-30 RX ADMIN — APIXABAN 2.5 MG: 2.5 TABLET, FILM COATED ORAL at 23:43

## 2024-06-30 RX ADMIN — ACETAMINOPHEN 650 MG: 325 TABLET, FILM COATED ORAL at 14:10

## 2024-06-30 RX ADMIN — METHOCARBAMOL 250 MG: 500 TABLET ORAL at 10:44

## 2024-06-30 RX ADMIN — SODIUM CHLORIDE 10 UNITS: 9 INJECTION, SOLUTION INTRAVENOUS at 11:50

## 2024-06-30 RX ADMIN — SODIUM BICARBONATE 100 MEQ: 84 INJECTION INTRAVENOUS at 21:25

## 2024-06-30 RX ADMIN — CARVEDILOL 6.25 MG: 3.12 TABLET, FILM COATED ORAL at 17:29

## 2024-06-30 RX ADMIN — SENNOSIDES AND DOCUSATE SODIUM 1 TABLET: 50; 8.6 TABLET ORAL at 08:14

## 2024-06-30 RX ADMIN — SIMVASTATIN 10 MG: 10 TABLET, FILM COATED ORAL at 23:43

## 2024-06-30 RX ADMIN — Medication 15 G: at 23:43

## 2024-06-30 RX ADMIN — SODIUM ZIRCONIUM CYCLOSILICATE 10 G: 5 POWDER, FOR SUSPENSION ORAL at 21:35

## 2024-06-30 RX ADMIN — APIXABAN 2.5 MG: 2.5 TABLET, FILM COATED ORAL at 08:14

## 2024-06-30 RX ADMIN — SODIUM CHLORIDE 10 UNITS: 9 INJECTION, SOLUTION INTRAVENOUS at 21:16

## 2024-06-30 RX ADMIN — ACETAMINOPHEN 650 MG: 325 TABLET, FILM COATED ORAL at 08:14

## 2024-06-30 RX ADMIN — SODIUM CHLORIDE: 4 INJECTION, SOLUTION, CONCENTRATE INTRAVENOUS at 16:39

## 2024-06-30 RX ADMIN — CARVEDILOL 6.25 MG: 3.12 TABLET, FILM COATED ORAL at 08:13

## 2024-06-30 ASSESSMENT — ACTIVITIES OF DAILY LIVING (ADL)
ADLS_ACUITY_SCORE: 37
ADLS_ACUITY_SCORE: 41
ADLS_ACUITY_SCORE: 37
ADLS_ACUITY_SCORE: 41
ADLS_ACUITY_SCORE: 37
ADLS_ACUITY_SCORE: 41
ADLS_ACUITY_SCORE: 39
ADLS_ACUITY_SCORE: 37
ADLS_ACUITY_SCORE: 41
ADLS_ACUITY_SCORE: 37
ADLS_ACUITY_SCORE: 37
ADLS_ACUITY_SCORE: 39
ADLS_ACUITY_SCORE: 41
ADLS_ACUITY_SCORE: 39
ADLS_ACUITY_SCORE: 39
ADLS_ACUITY_SCORE: 41
ADLS_ACUITY_SCORE: 39
ADLS_ACUITY_SCORE: 41
ADLS_ACUITY_SCORE: 39

## 2024-06-30 NOTE — PLAN OF CARE
Goal Outcome Evaluation:                      Date/Time: 06/29 1900-0730     Trauma/Ortho/Medical (Choose one): Trauma     Diagnosis: ORIF (L) Hip  POD#: 6  Mental Status: Oriented x 4  Activity/dangle: Up with 1-2 assist with GB/Walker  Diet: Mod CHO, BG checks  Pain: PRN oxycodone  Tiwari/Voiding: Bathroom  Tele/Restraints/Iso: None  02/LDA: RA  IV SL  D/C Date: TBD  Other Info:  Sodium check every 6 hours

## 2024-06-30 NOTE — PROGRESS NOTES
[facility]    Medicine Progress Note - Hospitalist Service    Date of Admission:  6/23/2024    Assessment & Plan     Lisa Meraz is a 89 year old female who presents to the emergency room after mechanical fall earlier today. She was attempting to make her bed when she fell. Denies any loss of consciousness, but does state that she hit her head. Found to have left femur fracture. CTH and CT of cervical spine showed no trauma. Anticipate medically stable for discharge in 1-2 days.     Mechanical Fall  CHT  Left IT fracture of proximal femur s/p ORIF left intertrochanteric fracture with short intramedullary nail 6/24/24  -  dilaudid prn for pain, schedule tylenol   - Bowel regimen   - Orthopedics following, recommendations appreciated  - Pain control and VTE prophylaxis per orthopedic surgery, now back on PTA Apixiban 2.5 mg bid  - PT/OT, likely TCU placement     Hyperkalemia,   Hyponatremia  ANDREA versus CKD   Cachexia     Creatinine trend since admission 1.07 > 1.10 > 1.17 > 1.20 > 1.16 >1.37. Na trend 133 > 132 > 131 > 130 > 125 > 124 > 126 >123>>121.    K+ is 5.6  After an initial attempt to diurese patient with 20 mg of IV lasix (due to hypoxia) sodium decreased from 130  to 125.   Urine sodium < 20, Urine osm 500, serum osm isotonic. Suspecting hypovolemic hyponatremia.   - Appreciate nephrology assistance, rehydrating with NS 6/28, if Na normalizing on 6/29 patient would be medically stable for discharge to TCU  - Daily BMP  - Nutrition consult placed 6/26 6/29 Potasium increased to 5.5, Creatinine is uptrending to 1.37 and sodium is drifting down to 123  Deferred to nephrology  6/30? Continues to have hyponatremia and hyperkalemia,    - 10 units of inulin ordered   - Hyperkalemia protocol.   - 2% saline at 50 mLs/hour   - continue serial sodium measurement        Post-operative Hypoxia, resolved  Right Transudative Pleural effusion s/p US thoracentesis 6/28  Patient has been on  continuous oximetry for >24 hours and we have not been able to wean off of oxygen. She continues to require 1 lpm. She consistently drops to the mid 80's on room air. Chest XR showed right lower lobe infiltrate, versus effusion, versus atelectasis. Significantly reduced breath sounds at right lung base. No wheezes, crackles, or rales. She is on DOAC but at lowered dose. Not tachycardic. Currently do not suspect PE. WBC now slightly elevated at 11.6 but patient without infectious symptoms. Chest CT showed large right pleural effusion. S/p US thoracentesis on 6/28. Findings consistent with transudate effusion, perhaps in setting of atelectasis after surgery. BNP is elevated however patient appears more volume down on exam so do not suspect acute HFrEF.  - Repeat chest XR ordered for 6/29, showed improved pleural effusion after the thoracentesis  with improved lung aeration     Anxiety  Apparently anxious on admission. Calm and cooperative this morning. She reports prn medication for sleep (seroquel) has been helping a lot.   - Seroquel 6.25 mg BID prn and 12.5 mg at bedtime prn.  - Avoid anticholinergics     Hypertension  Intermittent atrial fibrillation  History of paroxysmal complete heart block s/p dual chamber permament pacemaker 5/10/21  H/o of Severe Mitral regurgitation  Denies any chest pain, palpitations or anginal equivalents on admission. She has a history of nonobstructive carotid artery disease, pulmonary hypertension, mild aortic valve stenosis, severe mitral valve regurgitation with a flail P2 leaflet. She follows with Dr. Kenney at [facility]. She is due for outpatient cardiology follow up and repeat TTE which is being checked every 6 months. EKG with ventricularly paced rhythm. Repeat TTE on 6/27 due to post-operative hypoxia showing new WMA and decrease in EF. However patient    - Now back on DOAC s/p surgery   - Cardiology consulted due to reduced EF and new WMA seen on TTE,  recommending GDMT and outpatient cardiology follow up for further risk stratification  - Started on low dose lisinopril and COREG, recommend continuing these on discharge   - Holding PTA chlorthalidone and benazepril given normotension, likely to hold on discharge    DM Type II  Glycemia is suboptimally controled with the current Insulin regimen   -  continue Mod CHO diet, Increase carb coverage from1 unit for every 15 carbs to 1.5 units every 15 carbs on 6/30     - Sliding scale insulin  - Hold PTA metformin,       Hyperlipidemia  - Continue statin     Constipation  - Bowel regimen    Normocytic anemia  Hemoglobin on admission 10.1,   - Continue to monitor           Diet: Moderate Consistent Carb (60 g CHO per Meal) Diet  Snacks/Supplements Adult: Glucerna; With Meals  Fluid restriction 1200 ML FLUID      DVT Prophylaxis: Pneumatic Compression Devices, PTA DOAC  Tiwari Catheter: Not present  Lines: None     Cardiac Monitoring: ACTIVE order. Indication: Electrolyte Imbalance (24 hours)- Magnesium <1.3 mg/ml; Potassium < =2.8 or > 5.5 mg/ml  Code Status: Full Code      Clinically Significant Risk Factors        # Hyperkalemia: Highest K = 5.8 mmol/L in last 2 days, will monitor as appropriate  # Hyponatremia: Lowest Na = 120 mmol/L in last 2 days, will monitor as appropriate           # Chronic heart failure with reduced ejection fraction: last echo with EF <40%                # Severe Malnutrition: based on nutrition assessment           Disposition Plan     Medically Ready for Discharge: Anticipated Tomorrow         Jaylen Seymour MD  Hospitalist Service  [facility]  Securely message with [email or web address] (more info)  Text page via [employer] Paging/Directory   ______________________________________________________________________    Interval History   - Patient slept well overnight, pain is controlled with the current pain regimen  - continues to breath OK at room air  - Continues to have  hyponatremia and Hyperkalemia      Physical Exam   Vital Signs: Temp: 97.9  F (36.6  C) Temp src: Oral BP: (!) 127/94 Pulse: 69   Resp: 16 SpO2: 98 % O2 Device: None (Room air)    Weight: 132 lbs 7.94 oz    Constitutional: alert   appears deconditioned  Respiratory: Adequate  lair entry, no wheezing  Cardiovascular: S1 S2 head , soft systolic murmur noted    Medical Decision Making       58 MINUTES SPENT BY ME on the date of service doing chart review, history, exam, documentation & further activities per the note.      Data   ------------------------- PAST 24 HR DATA REVIEWED -----------------------------------------------    I have personally reviewed the following data over the past 24 hrs:    10.6  \   9.1 (L)   / 303     121 (L); 121 (L) 89 (L) 55.7 (H) /  164 (H)   5.6 (H) 22 1.43 (H) \       Imaging results reviewed over the past 24 hrs:   No results found for this or any previous visit (from the past 24 hour(s)).

## 2024-06-30 NOTE — PROVIDER NOTIFICATION
On-call nephrologist, Dr Anne, paged with pt's sodium level of 121 and potassium level if 5.5. Order received for IV insulin, 10 U, to be administered stat.

## 2024-06-30 NOTE — PROGRESS NOTES
[facility]     Renal Progress Note       SHORTHAND KEY FOR MY NOTES:  c = with, s = without, p = after, a = before, x = except, asx = asymptomatic, tx = transplant or treatment, sx = symptoms or symptomatic, cx = canceled or culture, rxn = reaction, yday = yesterday, nl = normal, abx = antibiotics, fxn = function, dx = diagnosis, dz = disease, m/h = melena/hematochezia, c/d/l/ha = cramping/dizziness/lightheadedness/headache, d/c = discharge or diarrhea/constipation, f/c/n/v = fevers/chills/nausea/vomiting, cp/sob = chest pain/shortness of breath, tbv = total body volume, rxn = reaction, tdc = tunneled dialysis catheter, pta = prior to admission, hd = hemodialysis, pd = peritoneal dialysis, hhd = home hemodialysis, edw = estimated dry wt         Assessment/Plan:     1.  Severe hyponatremia.  Pt's most recent Na is down to 123 (corrected) despite getting 2% saline.  She is asx for the most part.  It seems that the pain from her LLE spasm/cramping may be driving more ADH release today.  She still seems dry on exam, so it's prob a multifactorial etiology for her low Na.    A.  Continue 2% saline x 6h more and then reassess.  B.  Check labs at 1800, midnight, and AM.  C.  Pt to keep trying to increase food intake.  D.  Fluid restriction 1200 ml every day.  E.  UreNa 15g bid.    2.  Severe hyperkalemia 2 met acidosis.  Pt's K is higher this afternoon.  It's prob bc of the acidosis + higher K food intake + lower distal delivery of Na given that she's dry.  Sugars are ok now, but were higher this AM.  She's getting insulin sliding scale.  A.  Give 100 mEq bicarb x 1.  B.  Repeat K later today.  C.  Continue med mgmt.  D.  Low k diet.    3.  ANDREA/CKD III.  Pt's cr is stable.  Making some urine.    A.  Continue to hold ACEi.  B.  Continue fluids.    4.  L femur fx s/p ORIF.  Pt has some continued discomfort, which may be contributing to ADH release.  A.  Pain control prn.    5.  DM2.  Pt's sugars are  better controlled.  The metformin has been held.  A.  Change to high insulin sliding scale.  B.  Agree c holding metformin.    6.  FEN.  Other than K and Na, other electrolytes are ok.  A.  Continue same diet.    Case d/w Chloé Otoole RN, pt/family.        Interval History:     Pt feels ok x for LLE cramps/spasms.  She is trying to abide by the free water restriction.  She is eating higher K foods still.  No cp/sob.          Medications and Allergies:     Current Facility-Administered Medications   Medication Dose Route Frequency Provider Last Rate Last Admin    apixaban ANTICOAGULANT (ELIQUIS) tablet 2.5 mg  2.5 mg Oral BID Reese Lofton PA-C   2.5 mg at 06/30/24 0814    carvedilol (COREG) tablet 6.25 mg  6.25 mg Oral BID w/meals Abbe Helton MD   6.25 mg at 06/30/24 0813    insulin aspart (NovoLOG) injection (RAPID ACTING)   Subcutaneous TID w/meals Jaylen Seymour MD   4 Units at 06/30/24 0814    insulin aspart (NovoLOG) injection (RAPID ACTING)  1-7 Units Subcutaneous TID AC Jaylen Seymour MD   3 Units at 06/30/24 0814    insulin aspart (NovoLOG) injection (RAPID ACTING)  1-5 Units Subcutaneous At Bedtime Reese Lofton PA-C   2 Units at 06/29/24 2315    multivitamin w/minerals (THERA-VIT-M) tablet 1 tablet  1 tablet Oral Daily Louis Zuluaga,    1 tablet at 06/30/24 0813    polyethylene glycol (MIRALAX) Packet 17 g  17 g Oral Daily Reese Lofton PA-C   17 g at 06/30/24 0813    senna-docusate (SENOKOT-S/PERICOLACE) 8.6-50 MG per tablet 1 tablet  1 tablet Oral BID Reese Lofton PA-C   1 tablet at 06/30/24 0814    simvastatin (ZOCOR) tablet 10 mg  10 mg Oral QPM Reese Lofton PA-C   10 mg at 06/29/24 2050    sodium bicarbonate 8.4 % injection 100 mEq  100 mEq Intravenous Once Abdoulaye Mcqueen MD        sodium chloride (PF) 0.9% PF flush 3 mL  3 mL Intracatheter Q8H Reese Lofton PA-C   3 mL at 06/30/24 0232     No Known Allergies       Physical Exam:      Vitals were reviewed     , Blood pressure 124/65, pulse 79, temperature 97.7  F (36.5  C), temperature source Oral, resp. rate 16, weight 60.1 kg (132 lb 7.9 oz), SpO2 95%.  Wt Readings from Last 3 Encounters:   06/30/24 60.1 kg (132 lb 7.9 oz)     Intake/Output Summary (Last 24 hours) at 6/30/2024 1608  Last data filed at 6/30/2024 1300  Gross per 24 hour   Intake 1220 ml   Output 400 ml   Net 820 ml     GENERAL APPEARANCE: pleasant, NAD, seems more alert than yday  RESP: CTA B c good efforts  CV: RRR, nl S1/S2   ABDOMEN: s/nt/nd  EXTREMITIES/SKIN: no ble edema; LLE cramps/spasms         Data:     CBC RESULTS:     Recent Labs   Lab 06/30/24  0959 06/28/24  0714 06/27/24  0813 06/26/24  0744 06/25/24  0657 06/24/24  0733   WBC 10.6 9.6 11.6*  --  8.6 9.5   RBC 3.29* 3.42* 3.45*  --  3.49* 3.30*   HGB 9.1* 9.5* 9.0* 9.3* 9.6* 8.8*   HCT 28.5* 29.6* 29.5*  --  30.6* 28.3*    266 235  --  207 196     Basic Metabolic Panel:  Recent Labs   Lab 06/30/24  1445 06/30/24  1440 06/30/24  1252 06/30/24  1137 06/30/24  0959 06/30/24  0728 06/30/24  0215 06/30/24  0006 06/29/24  2212 06/29/24  1938 06/29/24  1626 06/29/24  1455 06/29/24  0735 06/29/24  0733 06/28/24  1142 06/28/24  0714   *  122*  --   --   --  121*  121*  --   --  121*  --  120*  --  120*  --  123*  --  126*   POTASSIUM 5.9*  --   --   --  5.6*  --   --   --   --  5.2  --  5.8*  5.8*  --  5.5*  --  4.9   CHLORIDE 91*  --   --   --  89*  --   --   --   --  88*  --  88*  --  91*  --  90*   CO2 19*  --   --   --  22  --   --   --   --  22  --  22  --  22  --  25   BUN 56.3*  --   --   --  55.7*  --   --   --   --  58.7*  --  54.2*  --  52.1*  --  45.1*   CR 1.41*  --   --   --  1.43*  --   --   --   --  1.53*  --  1.35*  --  1.37*  --  1.21*   * 166* 164* 272* 333* 240*   < >  --    < > 315*   < > 254*   < > 251*   < > 229*   JHONATHAN 8.0*  --   --   --  7.9*  --   --   --   --  7.7*  --  8.1*  --  7.9*  --  8.5    < > = values in this  interval not displayed.     INR  No lab results found in last 7 days.     Attestation:   I have reviewed today's relevant vital signs, notes, medications, labs and imaging.    Abdoulaye Mcqueen MD  [facility] - Nephrology  [phone]

## 2024-06-30 NOTE — PROGRESS NOTES
"Pt\"s daughter( Regina )called, wanted to be updated regarding pt's chest X-ray,NA and K.Dtr updated and said she will call in the morning for another update.  "

## 2024-06-30 NOTE — PROVIDER NOTIFICATION
Dr. Seymour paged with the following message: Read - 10:53 am  Please see pt's lab result. Sodium is 121. Started 2% NS solution. Potassium is 5.5. Please advise. TY!  Addendum at 1055: Per Dr. Seymour, Continue the 2% Saline and Nephrology is following for the hyponatremia and hyperkalemia

## 2024-06-30 NOTE — PLAN OF CARE
Date/Time: 06/30: [id number]    Trauma/Ortho/Medical (Choose one): Ortho    Diagnosis: (L) Hip ORIF  POD#: 6  Mental Status: Oriented x 4  Activity/dangle: Up with 1 assist with a GB/Walker  Diet: 2G K+/Mod CHO/FR of 1200 ml  Pain: Managed with prn tylenol, oxycodone, and robaxin. Has been having more spasm today  Tiwari/Voiding: Bathroom.Had 2 BMs today  Tele/Restraints/Iso: Tele is 100 % v-paced  02/LDA: RA. Has 2% sodium chloride infusing at 50 ml/hour.   D/C Date: Unknown  Other Info: Did receive 10 U regular insulin via IV once for potassium of 5.5. Recheck potassium was 5.9 and sodium was 122. Per Dr Mcqueen's notes, 2% sodium chloride should run for 6 hrs and will have lab drawn at midnight. Received sodium bicarbonate X 1. Appetite is better compared to the last 2 days. Is on sliding scale coverage, getting more insulin. Pt is on carb count as well but it is challenging since pt's family has been brining meals from home. Hospitalist/nephrologist were notified. Dressing on (L) Hip changed which which was saturated with serosang drainage

## 2024-07-01 ENCOUNTER — APPOINTMENT (OUTPATIENT)
Dept: PHYSICAL THERAPY | Facility: CLINIC | Age: 89
DRG: 480 | End: 2024-07-01
Payer: COMMERCIAL

## 2024-07-01 LAB
% LINING CELLS, BODY FLUID: 22 %
ALBUMIN SERPL BCG-MCNC: 2.8 G/DL (ref 3.5–5.2)
ANION GAP SERPL CALCULATED.3IONS-SCNC: 10 MMOL/L (ref 7–15)
ANION GAP SERPL CALCULATED.3IONS-SCNC: 10 MMOL/L (ref 7–15)
ANION GAP SERPL CALCULATED.3IONS-SCNC: 9 MMOL/L (ref 7–15)
APPEARANCE FLD: CLEAR
BUN SERPL-MCNC: 58.2 MG/DL (ref 8–23)
BUN SERPL-MCNC: 67.8 MG/DL (ref 8–23)
BUN SERPL-MCNC: 68.8 MG/DL (ref 8–23)
CALCIUM SERPL-MCNC: 7.6 MG/DL (ref 8.2–9.6)
CALCIUM SERPL-MCNC: 8 MG/DL (ref 8.2–9.6)
CALCIUM SERPL-MCNC: 8.1 MG/DL (ref 8.2–9.6)
CELL COUNT BODY FLUID SOURCE: NORMAL
CHLORIDE SERPL-SCNC: 92 MMOL/L (ref 98–107)
CHLORIDE SERPL-SCNC: 93 MMOL/L (ref 98–107)
CHLORIDE SERPL-SCNC: 93 MMOL/L (ref 98–107)
COLOR FLD: YELLOW
CREAT SERPL-MCNC: 1.37 MG/DL (ref 0.51–0.95)
CREAT SERPL-MCNC: 1.47 MG/DL (ref 0.51–0.95)
CREAT SERPL-MCNC: 1.5 MG/DL (ref 0.51–0.95)
DEPRECATED HCO3 PLAS-SCNC: 25 MMOL/L (ref 22–29)
DEPRECATED HCO3 PLAS-SCNC: 27 MMOL/L (ref 22–29)
DEPRECATED HCO3 PLAS-SCNC: 28 MMOL/L (ref 22–29)
EGFRCR SERPLBLD CKD-EPI 2021: 33 ML/MIN/1.73M2
EGFRCR SERPLBLD CKD-EPI 2021: 34 ML/MIN/1.73M2
EGFRCR SERPLBLD CKD-EPI 2021: 37 ML/MIN/1.73M2
ERYTHROCYTE [DISTWIDTH] IN BLOOD BY AUTOMATED COUNT: 14.9 % (ref 10–15)
GLUCOSE BLDC GLUCOMTR-MCNC: 123 MG/DL (ref 70–99)
GLUCOSE BLDC GLUCOMTR-MCNC: 130 MG/DL (ref 70–99)
GLUCOSE BLDC GLUCOMTR-MCNC: 159 MG/DL (ref 70–99)
GLUCOSE BLDC GLUCOMTR-MCNC: 231 MG/DL (ref 70–99)
GLUCOSE BLDC GLUCOMTR-MCNC: 255 MG/DL (ref 70–99)
GLUCOSE BLDC GLUCOMTR-MCNC: 324 MG/DL (ref 70–99)
GLUCOSE SERPL-MCNC: 106 MG/DL (ref 70–99)
GLUCOSE SERPL-MCNC: 171 MG/DL (ref 70–99)
GLUCOSE SERPL-MCNC: 226 MG/DL (ref 70–99)
HCT VFR BLD AUTO: 27.8 % (ref 35–47)
HGB BLD-MCNC: 8.8 G/DL (ref 11.7–15.7)
LYMPHOCYTES NFR FLD MANUAL: 58 %
MCH RBC QN AUTO: 26.8 PG (ref 26.5–33)
MCHC RBC AUTO-ENTMCNC: 31.7 G/DL (ref 31.5–36.5)
MCV RBC AUTO: 85 FL (ref 78–100)
MONOS+MACROS NFR FLD MANUAL: 16 %
NEUTS BAND NFR FLD MANUAL: 4 %
PATH REV: NORMAL
PHOSPHATE SERPL-MCNC: 3.4 MG/DL (ref 2.5–4.5)
PLATELET # BLD AUTO: 296 10E3/UL (ref 150–450)
POTASSIUM SERPL-SCNC: 4.5 MMOL/L (ref 3.4–5.3)
POTASSIUM SERPL-SCNC: 5.2 MMOL/L (ref 3.4–5.3)
POTASSIUM SERPL-SCNC: 5.3 MMOL/L (ref 3.4–5.3)
RBC # BLD AUTO: 3.28 10E6/UL (ref 3.8–5.2)
SODIUM SERPL-SCNC: 127 MMOL/L (ref 135–145)
SODIUM SERPL-SCNC: 130 MMOL/L (ref 135–145)
SODIUM SERPL-SCNC: 130 MMOL/L (ref 135–145)
WBC # BLD AUTO: 12.6 10E3/UL (ref 4–11)
WBC # FLD AUTO: 386 /UL

## 2024-07-01 PROCEDURE — 99207 PR APP CREDIT; MD BILLING SHARED VISIT: CPT | Performed by: HOSPITALIST

## 2024-07-01 PROCEDURE — 82310 ASSAY OF CALCIUM: CPT | Performed by: INTERNAL MEDICINE

## 2024-07-01 PROCEDURE — 99232 SBSQ HOSP IP/OBS MODERATE 35: CPT | Performed by: INTERNAL MEDICINE

## 2024-07-01 PROCEDURE — 80069 RENAL FUNCTION PANEL: CPT | Performed by: INTERNAL MEDICINE

## 2024-07-01 PROCEDURE — 36415 COLL VENOUS BLD VENIPUNCTURE: CPT | Performed by: INTERNAL MEDICINE

## 2024-07-01 PROCEDURE — 250N000013 HC RX MED GY IP 250 OP 250 PS 637

## 2024-07-01 PROCEDURE — 85027 COMPLETE CBC AUTOMATED: CPT | Performed by: INTERNAL MEDICINE

## 2024-07-01 PROCEDURE — 250N000013 HC RX MED GY IP 250 OP 250 PS 637: Performed by: STUDENT IN AN ORGANIZED HEALTH CARE EDUCATION/TRAINING PROGRAM

## 2024-07-01 PROCEDURE — 97530 THERAPEUTIC ACTIVITIES: CPT | Mod: GP

## 2024-07-01 PROCEDURE — 250N000013 HC RX MED GY IP 250 OP 250 PS 637: Performed by: INTERNAL MEDICINE

## 2024-07-01 PROCEDURE — 99418 PROLNG IP/OBS E/M EA 15 MIN: CPT | Performed by: PHYSICIAN ASSISTANT

## 2024-07-01 PROCEDURE — 99233 SBSQ HOSP IP/OBS HIGH 50: CPT | Performed by: PHYSICIAN ASSISTANT

## 2024-07-01 PROCEDURE — 97110 THERAPEUTIC EXERCISES: CPT | Mod: GP

## 2024-07-01 PROCEDURE — 250N000012 HC RX MED GY IP 250 OP 636 PS 637: Performed by: PHYSICIAN ASSISTANT

## 2024-07-01 PROCEDURE — 120N000001 HC R&B MED SURG/OB

## 2024-07-01 RX ORDER — AMOXICILLIN 250 MG
1 CAPSULE ORAL 2 TIMES DAILY PRN
Status: DISCONTINUED | OUTPATIENT
Start: 2024-07-01 | End: 2024-07-03

## 2024-07-01 RX ORDER — NICOTINE POLACRILEX 4 MG
15-30 LOZENGE BUCCAL
Status: DISCONTINUED | OUTPATIENT
Start: 2024-07-01 | End: 2024-07-01

## 2024-07-01 RX ORDER — DEXTROSE MONOHYDRATE 25 G/50ML
25-50 INJECTION, SOLUTION INTRAVENOUS
Status: DISCONTINUED | OUTPATIENT
Start: 2024-07-01 | End: 2024-07-01

## 2024-07-01 RX ORDER — POLYETHYLENE GLYCOL 3350 17 G/17G
17 POWDER, FOR SOLUTION ORAL DAILY PRN
Status: DISCONTINUED | OUTPATIENT
Start: 2024-07-01 | End: 2024-07-05 | Stop reason: HOSPADM

## 2024-07-01 RX ADMIN — METHOCARBAMOL 250 MG: 500 TABLET ORAL at 01:01

## 2024-07-01 RX ADMIN — APIXABAN 2.5 MG: 2.5 TABLET, FILM COATED ORAL at 08:12

## 2024-07-01 RX ADMIN — CARVEDILOL 6.25 MG: 3.12 TABLET, FILM COATED ORAL at 08:12

## 2024-07-01 RX ADMIN — INSULIN GLARGINE 5 UNITS: 100 INJECTION, SOLUTION SUBCUTANEOUS at 17:52

## 2024-07-01 RX ADMIN — ACETAMINOPHEN 650 MG: 325 TABLET, FILM COATED ORAL at 08:12

## 2024-07-01 RX ADMIN — APIXABAN 2.5 MG: 2.5 TABLET, FILM COATED ORAL at 21:25

## 2024-07-01 RX ADMIN — ACETAMINOPHEN 650 MG: 325 TABLET, FILM COATED ORAL at 01:01

## 2024-07-01 RX ADMIN — SIMVASTATIN 10 MG: 10 TABLET, FILM COATED ORAL at 19:51

## 2024-07-01 RX ADMIN — Medication 1 TABLET: at 08:12

## 2024-07-01 RX ADMIN — METHOCARBAMOL 250 MG: 500 TABLET ORAL at 08:12

## 2024-07-01 RX ADMIN — ACETAMINOPHEN 650 MG: 325 TABLET, FILM COATED ORAL at 19:51

## 2024-07-01 RX ADMIN — OXYCODONE HYDROCHLORIDE 5 MG: 5 TABLET ORAL at 19:51

## 2024-07-01 RX ADMIN — CARVEDILOL 6.25 MG: 3.12 TABLET, FILM COATED ORAL at 18:01

## 2024-07-01 ASSESSMENT — ACTIVITIES OF DAILY LIVING (ADL)
ADLS_ACUITY_SCORE: 34
ADLS_ACUITY_SCORE: 37
ADLS_ACUITY_SCORE: 34
ADLS_ACUITY_SCORE: 34
ADLS_ACUITY_SCORE: 35
ADLS_ACUITY_SCORE: 35
ADLS_ACUITY_SCORE: 34
ADLS_ACUITY_SCORE: 34
ADLS_ACUITY_SCORE: 37
ADLS_ACUITY_SCORE: 34
ADLS_ACUITY_SCORE: 37
ADLS_ACUITY_SCORE: 34
ADLS_ACUITY_SCORE: 37
ADLS_ACUITY_SCORE: 35
ADLS_ACUITY_SCORE: 37
ADLS_ACUITY_SCORE: 34
ADLS_ACUITY_SCORE: 35
ADLS_ACUITY_SCORE: 34

## 2024-07-01 NOTE — PROGRESS NOTES
[facility]     Renal Progress Note       SHORTHAND KEY FOR MY NOTES:  c = with, s = without, p = after, a = before, x = except, asx = asymptomatic, tx = transplant or treatment, sx = symptoms or symptomatic, cx = canceled or culture, rxn = reaction, yday = yesterday, nl = normal, abx = antibiotics, fxn = function, dx = diagnosis, dz = disease, m/h = melena/hematochezia, c/d/l/ha = cramping/dizziness/lightheadedness/headache, d/c = discharge or diarrhea/constipation, f/c/n/v = fevers/chills/nausea/vomiting, cp/sob = chest pain/shortness of breath, tbv = total body volume, rxn = reaction, tdc = tunneled dialysis catheter, pta = prior to admission, hd = hemodialysis, pd = peritoneal dialysis, hhd = home hemodialysis, edw = estimated dry wt         Assessment/Plan:     1.  Severe hyponatremia.  Pt's Na has improved to 130 this AM p getting fluids.  She is symptomatically better today.  She may be approaching euvolemia bc she finally has some edema, which is from hypoalbuminemic third-spacing.    A.  Repeat BMP tonight.  B.  Continue fluid restriction.  C.  Hold on UreNa right now.  If the Na drops tonight, then start UreNa 15 g bid.    2.  Severe hyperkalemia 2 met acidosis.  Pt's K is ok today.  She had diarrhea yday which could have contributed to the NAGMA.  Renal dysfxn can too.  Her hb has been dropping so wonder if she might have occult bleeding at the surgical site.  BUN is not that high, but has increased a bit. Sugars have been high, which is also contributing.  A.  Check labs tonight and daily.  B.  If K is high due to acidosis, then give bicarb.  Watch resp status if so.  C.  If K is high due to non-acidosis reasons, then med mgmt c Lokelma, insulin, nebs.   D.  Low k diet.    3.  ANDREA/CKD III.  Pt's cr is still higher than baseline.  She's prob still intravascularly dry.  A.  Continue to hold ACEi.    4.  L femur fx s/p ORIF.  Pt feels better today.  A.  Pain control prn.    5.  DM2.  " Pt's sugars are not well controlled.  Metformin is on hold.    A.  High insulin sliding scale.  B.  Agree c holding metformin.    6.  FEN.  Other than K and Na, other electrolytes are ok.  A.  Continue same diet.    Case d/w Emely Novoa PA-C, Barney BERNAL, pt/dtr (via phone).        Interval History:     Pt feels better today.  Her cramping has improved.  She feels fine and her family also agrees that she is less confused today.          Medications and Allergies:     Current Facility-Administered Medications   Medication Dose Route Frequency Provider Last Rate Last Admin    apixaban ANTICOAGULANT (ELIQUIS) tablet 2.5 mg  2.5 mg Oral BID Reese Lofton PA-C   2.5 mg at 07/01/24 0812    carvedilol (COREG) tablet 6.25 mg  6.25 mg Oral BID w/meals Abbe Helton MD   6.25 mg at 07/01/24 0812    insulin aspart (NovoLOG) injection (RAPID ACTING)   Subcutaneous TID w/meals Jaylen Seymour MD   5 Units at 07/01/24 1301    insulin aspart (NovoLOG) injection (RAPID ACTING)  1-7 Units Subcutaneous TID AC Jaylen Seymour MD   8 Units at 07/01/24 1300    insulin aspart (NovoLOG) injection (RAPID ACTING)  1-5 Units Subcutaneous At Bedtime Reese Lofton PA-C   2 Units at 06/29/24 2315    insulin glargine (LANTUS PEN) injection 5 Units  5 Units Subcutaneous Daily Emely Novoa PA-C        multivitamin w/minerals (THERA-VIT-M) tablet 1 tablet  1 tablet Oral Daily Louis Zuluaga, DO   1 tablet at 07/01/24 0812    simvastatin (ZOCOR) tablet 10 mg  10 mg Oral QPM Reese Lofton PA-C   10 mg at 06/30/24 2343    sodium chloride (PF) 0.9% PF flush 3 mL  3 mL Intracatheter Q8H Reese Lofton PA-C   3 mL at 06/30/24 0232     No Known Allergies       Physical Exam:     Vitals were reviewed     , Blood pressure 100/42, pulse 68, temperature 98  F (36.7  C), temperature source Oral, resp. rate 16, height 1.6 m (5' 3\"), weight 61.1 kg (134 lb 11.2 oz), SpO2 93%.  Wt Readings from Last 3 " Encounters:   07/01/24 61.1 kg (134 lb 11.2 oz)     Intake/Output Summary (Last 24 hours) at 7/1/2024 1536  Last data filed at 7/1/2024 0806  Gross per 24 hour   Intake 900 ml   Output --   Net 900 ml     GENERAL APPEARANCE: pleasant, NAD, alert  RESP: CTA B c good efforts  CV: RRR, nl S1/S2   ABDOMEN: s/nt/nd  EXTREMITIES/SKIN: tr ble edema; LLE cramps/spasms         Data:     CBC RESULTS:     Recent Labs   Lab 07/01/24  0718 06/30/24  0959 06/28/24  0714 06/27/24  0813 06/26/24  0744 06/25/24  0657   WBC 12.6* 10.6 9.6 11.6*  --  8.6   RBC 3.28* 3.29* 3.42* 3.45*  --  3.49*   HGB 8.8* 9.1* 9.5* 9.0* 9.3* 9.6*   HCT 27.8* 28.5* 29.6* 29.5*  --  30.6*    303 266 235  --  207     Basic Metabolic Panel:  Recent Labs   Lab 07/01/24  1202 07/01/24  0718 07/01/24  0419 07/01/24  0230 06/30/24  2338 06/30/24  2325 06/30/24  2146 06/30/24  1910 06/30/24  1615 06/30/24  1445 06/30/24  1137 06/30/24  0959 06/30/24  0215 06/30/24  0006 06/29/24  2212 06/29/24  1938   NA  --  130*  --   --  130*  --   --  124*  --  122*  122*  --  121*  121*  --  121*  --  120*   POTASSIUM  --  5.2  --   --  4.5  --   --  6.3*  --  5.9*  --  5.6*  --   --   --  5.2   CHLORIDE  --  93*  --   --  93*  --   --  92*  --  91*  --  89*  --   --   --  88*   CO2  --  27  --   --  28  --   --  21*  --  19*  --  22  --   --   --  22   BUN  --  68.8*  --   --  58.2*  --   --  56.7*  --  56.3*  --  55.7*  --   --   --  58.7*   CR  --  1.37*  --   --  1.47*  --   --  1.44*  --  1.41*  --  1.43*  --   --   --  1.53*   * 171* 130* 123* 106* 116*   < > 247*   < > 156*   < > 333*   < >  --    < > 315*   JHONATHAN  --  8.1*  --   --  7.6*  --   --  7.8*  --  8.0*  --  7.9*  --   --   --  7.7*    < > = values in this interval not displayed.     INR  No lab results found in last 7 days.     Attestation:   I have reviewed today's relevant vital signs, notes, medications, labs and imaging.    Abdoulaye Mcqueen MD  [facility] -  Nephrology  [phone]

## 2024-07-01 NOTE — PROVIDER NOTIFICATION
Dr. Mcqueen called and aware of lab result- Potassium 6.3 and sodium 124. Placed stat orders for meds (see eMAR). Updated the pry RN.

## 2024-07-01 NOTE — PROGRESS NOTES
Date/Time: 06/30 1900-0730     Trauma/Ortho/Medical (Choose one): Trauma     Diagnosis: ORIF (L) Hip  POD#: 7  Mental Status: Oriented x 4  Activity/dangle: A 1-2  GB/Walker  Diet: Mod CHO 2 gm K diet , BG checks FR 1200  Pain: PRN oxycodone,robaxin  Tiwari/Voiding: BR  Tele/Restraints/Iso: None  02/LDA:   IV SL  D/C Date: TBD  Other Info:  Needed 2 lpm of O2 while sleeping to keep O2 sats above 90   Left hip dressing moderately saturated with serosanguinous drainage,will continue to monitor /change >60% saturation.  K within normal range,NA improving,BMP recheck this morning

## 2024-07-01 NOTE — PROGRESS NOTES
MD Notification    Notified Person: MD    Notified Person Name:Darin Vizcarra    Notification Date/Time:0020    Notification Interaction:Vocera messaging  Purpose of Notification:Vbg and BMP lab results      Orders Received:plan to repeat BMP in am     Comments: Also ,see Dr Mcqueen progress notes for more orders.

## 2024-07-01 NOTE — PROGRESS NOTES
Na 130. When correcting for glc, the rise in the past 24h has been 6-7 mEq/L. Rec d 2% saline + UreNa 15g x 1.      K back to normal p more bicarb and Lokelma.    1.  Will hold on UreNa in AM.   2.  Stop 2%.  3.  Follow Na, K.  4.  Ok for her to drink some more water if thirsty.

## 2024-07-01 NOTE — PROGRESS NOTES
Care Management Follow Up    Length of Stay (days): 8    Expected Discharge Date: 07/01/2024     Concerns to be Addressed:       Patient plan of care discussed at interdisciplinary rounds: Yes    Anticipated Discharge Disposition: Transitional Care     Anticipated Discharge Services:    Anticipated Discharge DME: Kye    Patient/family educated on Medicare website which has current facility and service quality ratings: yes  Education Provided on the Discharge Plan:    Patient/Family in Agreement with the Plan: yes    Referrals Placed by CM/SW:    Private pay costs discussed: Not applicable    Additional Information:  Call placed to Darcy at [phone] regarding patient discharging to [facility] today and was directed to Aleksey at [phone] and then was transferred to [facility] with admissions ([phone]). Patient can discharge today and admit to TCU and they'd like her there no later than 1600. Per chart patient will need transport via wheelchair to TCU. Message sent to provider who noted that she is not ready today but likely tomorrow. Call to admissions to update and left a VM.    1000: [facility] can accept patient tomorrow if medically ready. Writer updated family and confirmed that she will need transportation set up. Writer will set up a tentative ride for 7/2/24. Call to [facility] and wheelchair ride scheduled for 7/2/24 between [phone]. SW will follow up in AM to see if patient is ready for discharge or not. [facility] at [facility] updated.     GENESIS Lisa

## 2024-07-01 NOTE — PLAN OF CARE
Goal Outcome Evaluation: Progressing    Reason for Admission: Fall & L ORIF    Evaluation of Goal:   Patient is A&Ox 4. Vitals are stable on RA. Tele V-paced. Patient is up with assist of two with gait belt and walker, ambulated to the bathroom. Patient denies pain. L hip dressing is intact. Patient scoring green on the Aggression Stoplight Tool. Pt calm and cooperative with cares, able to make needs known, call light within reach, bed alarm on for safety. Recheck BMP this evening. Discharge disposition is pending.     Aleena Quintanilla RN on 7/1/2024 at 7:08 PM

## 2024-07-01 NOTE — PROGRESS NOTES
[facility]    Medicine Progress Note - Hospitalist Service    Date of Admission:  6/23/2024    Assessment & Plan   Lisa Meraz is a 89 year old female who presents to the emergency room after mechanical fall earlier today. She was attempting to make her bed when she fell. Denies any loss of consciousness, but does state that she hit her head. Found to have left femur fracture. CTH and CT of cervical spine showed no trauma. Anticipate medically stable for discharge in 1-2 days.     Mechanical Fall  CHT  Left IT fracture of proximal femur s/p ORIF left intertrochanteric fracture with short intramedullary nail 6/24/24  -  using prn tylenol, scheduled robaxin, prn oxy   - Bowel regimen   - Orthopedics following peripherally, recommend 2 week follow up with Dr. Richy Dunbar/Reese Lofton PA-C  - Pain control and VTE prophylaxis per orthopedic surgery, now back on PTA Apixiban 2.5 mg bid  - PT/OT/SW following, plan for TCU placement when medically stable      Hyperkalemia,   Hyponatremia  ANDREA on CKD III  NAGMA, resolved  Cachexia   Creatinine trend since admission 1.07 > 1.10 > 1.17 > 1.20 > 1.16 >1.37>1.47   Na trend 133 down to as low as 121, now 130   K+ intermittently elevated, now 5.2  After an initial attempt to diurese patient with 20 mg of IV lasix (due to hypoxia) sodium decreased from 130  to 125.   Urine sodium < 20, Urine osm 500, serum osm isotonic. Suspecting hypovolemic hyponatremia with SIADH from pain  -Nephrology following, appreciate assistance. Started on 2% NS 6/29, stopped evening 6/30 with Na 130. Appreciate assistance   --On 1200cc fluid restriction   --received lokelma evening 6/30  --holding PTA ACE, diuretic, metformin   - Daily BMP  - Nutrition consult placed 6/26    Leukocytosis   WBC 10.6 (6/30)>12.6. no fevers, infectious symptoms. No urinary symptoms.   Monitor for now, recheck CBC in am. Low threshold to obtain UA. Most CXR also had consolidation vs  atelectasis R base though no clinical symptoms of pneumonia.     Post-operative Hypoxia, resolved  Right Transudative Pleural effusion s/p US thoracentesis 6/28  Patient has been on continuous oximetry for >24 hours and we have not been able to wean off of oxygen. She continues to require 1 lpm. She consistently drops to the mid 80's on room air. Chest XR showed right lower lobe infiltrate, versus effusion, versus atelectasis. Significantly reduced breath sounds at right lung base. No wheezes, crackles, or rales. She is on DOAC but at lowered dose. Not tachycardic. Currently do not suspect PE. WBC now slightly elevated at 11.6 but patient without infectious symptoms. Chest CT showed large right pleural effusion. S/p US thoracentesis on 6/28. Findings consistent with transudate effusion, perhaps in setting of atelectasis after surgery. BNP is elevated however patient appears more volume down on exam so do not suspect acute HFrEF.  - Repeat chest XR ordered for 6/29, showed improved pleural effusion after the thoracentesis  with improved lung aeration   --currently stable on RA     Anxiety  Apparently anxious on admission. Calm and cooperative this morning. She reports prn medication for sleep (seroquel) has been helping a lot.   - Seroquel 6.25 mg BID prn and 12.5 mg at bedtime prn.  - Avoid anticholinergics     Hypertension  Intermittent atrial fibrillation  History of paroxysmal complete heart block s/p dual chamber permament pacemaker 5/10/21  H/o of Severe Mitral regurgitation  Denies any chest pain, palpitations or anginal equivalents on admission. She has a history of nonobstructive carotid artery disease, pulmonary hypertension, mild aortic valve stenosis, severe mitral valve regurgitation with a flail P2 leaflet. She follows with Dr. Kenney at [facility]. She is due for outpatient cardiology follow up and repeat TTE which is being checked every 6 months. EKG with ventricularly paced rhythm.  Repeat TTE on 6/27 due to post-operative hypoxia showing new WMA and decrease in EF. However patient    - Now back on DOAC s/p surgery   - Cardiology consulted due to reduced EF and new WMA seen on TTE, recommending GDMT and outpatient cardiology follow up for further risk stratification  - Started on low dose COREG, recommend continuing these on discharge   - Holding PTA chlorthalidone and benazepril given normotension and electrolyte derangements, will hold on discharge until follow up     DM Type II  On metformin 500bid PTA  A1C 8.9  BG quite labile, as high as 300s though 123 overnight   --hold metformin given ANDREA, acidosis   --start lantus 5u daily 7/1   -  continue Mod CHO diet, Increase carb coverage from1 unit for every 15 carbs to 1.5 units every 15 carbs on 6/30   - Sliding scale insulin, increase to high scale 7/1  --monitor for hypoglycemia      Hyperlipidemia  - Continue statin     Constipation  - Bowel regimen    Normocytic anemia  Hemoglobin on admission 10.1  - Continue to monitor           Diet: Snacks/Supplements Adult: Glucerna; With Meals  Fluid restriction 1200 ML FLUID  Combination Diet 2 gm K Diet; Moderate Consistent Carb (60 g CHO per Meal) Diet    DVT Prophylaxis: DOAC  Tiwari Catheter: Not present  Lines: None     Cardiac Monitoring: ACTIVE order. Indication: Electrolyte Imbalance (24 hours)- Magnesium <1.3 mg/ml; Potassium < =2.8 or > 5.5 mg/ml  Code Status: Full Code      Clinically Significant Risk Factors        # Hyperkalemia: Highest K = 6.3 mmol/L in last 2 days, will monitor as appropriate  # Hyponatremia: Lowest Na = 120 mmol/L in last 2 days, will monitor as appropriate           # Chronic heart failure with reduced ejection fraction: last echo with EF <40%              # Severe Malnutrition: based on nutrition assessment           Disposition Plan     Medically Ready for Discharge: Anticipated in 2-4 Days       The patient's care was discussed with Dr. Chow who agrees with the  "above plan     Emely Novoa PA-C  Hospitalist Service  [facility]  Securely message with [employer] (more info)  Text page via [email or web address] Paging/Directory   ______________________________________________________________________    Interval History   Doing well today though has intermittent brief muscle spasm L leg. No CP, SOB. Chronic baseline \"wooziness\" at times is unchanged. Tolerating po and ate well today thus far. No nausea or vomiting. Having frequent stools last evening. No abdominal pain. No fever, chills, urinary symptoms.   Daughter present at bedside and updated     Physical Exam   Temp: 98  F (36.7  C) Temp src: Oral BP: 100/42 Pulse: 68   Resp: 16 SpO2: 93 % O2 Device: None (Room air)    Vitals:    06/29/24 0822 06/30/24 0228 07/01/24 0549   Weight: 55 kg (121 lb 4.1 oz) 60.1 kg (132 lb 7.9 oz) 61.1 kg (134 lb 11.2 oz)     Vital Signs with Ranges  Temp:  [97.6  F (36.4  C)-98  F (36.7  C)] 98  F (36.7  C)  Pulse:  [64-79] 68  Resp:  [16] 16  BP: (100-124)/(42-65) 100/42  SpO2:  [92 %-95 %] 93 %  I/O last 3 completed shifts:  In: 1220 [P.O.:1220]  Out: 300 [Urine:300]    Constitutional: Alert, sitting up in chair. Appears comfortable and is appropriately conversant   ENT:  moist mucous membranes  Respiratory: Lungs with crackles R base. No increased work of breathing or wheezing  Cardiovascular: Regular rate and rhythm, no significant LE edema   GI:  active bowel sounds, abdomen soft, non-tender  MSK:  dorsi and platar flexion intact. Moves all four extremities   Neuro:  speech is clear. Face symmetric. Follows commands       Medical Decision Making       65 MINUTES SPENT BY ME on the date of service doing chart review, history, exam, documentation & further activities per the note.      Data     I have personally reviewed the following data over the past 24 hrs:    12.6 (H)  \   8.8 (L)   / 296     130 (L) 93 (L) 68.8 (H) /  324 (H)   5.2 27 1.37 (H) \     ALT: N/A AST: N/A AP: " N/A TBILI: N/A   ALB: 2.8 (L) TOT PROTEIN: N/A LIPASE: N/A       Imaging results reviewed over the past 24 hrs:   No results found for this or any previous visit (from the past 24 hour(s)).

## 2024-07-02 ENCOUNTER — APPOINTMENT (OUTPATIENT)
Dept: PHYSICAL THERAPY | Facility: CLINIC | Age: 89
DRG: 480 | End: 2024-07-02
Payer: COMMERCIAL

## 2024-07-02 LAB
ALBUMIN SERPL BCG-MCNC: 2.8 G/DL (ref 3.5–5.2)
ANION GAP SERPL CALCULATED.3IONS-SCNC: 7 MMOL/L (ref 7–15)
BUN SERPL-MCNC: 59.5 MG/DL (ref 8–23)
CALCIUM SERPL-MCNC: 8.3 MG/DL (ref 8.2–9.6)
CHLORIDE SERPL-SCNC: 93 MMOL/L (ref 98–107)
CREAT SERPL-MCNC: 1.31 MG/DL (ref 0.51–0.95)
DEPRECATED HCO3 PLAS-SCNC: 29 MMOL/L (ref 22–29)
EGFRCR SERPLBLD CKD-EPI 2021: 39 ML/MIN/1.73M2
ERYTHROCYTE [DISTWIDTH] IN BLOOD BY AUTOMATED COUNT: 15.5 % (ref 10–15)
FERRITIN SERPL-MCNC: 41 NG/ML (ref 11–328)
GLUCOSE BLDC GLUCOMTR-MCNC: 124 MG/DL (ref 70–99)
GLUCOSE BLDC GLUCOMTR-MCNC: 133 MG/DL (ref 70–99)
GLUCOSE BLDC GLUCOMTR-MCNC: 142 MG/DL (ref 70–99)
GLUCOSE BLDC GLUCOMTR-MCNC: 176 MG/DL (ref 70–99)
GLUCOSE BLDC GLUCOMTR-MCNC: 220 MG/DL (ref 70–99)
GLUCOSE BLDC GLUCOMTR-MCNC: 256 MG/DL (ref 70–99)
GLUCOSE SERPL-MCNC: 133 MG/DL (ref 70–99)
HCT VFR BLD AUTO: 26.2 % (ref 35–47)
HGB BLD-MCNC: 8.1 G/DL (ref 11.7–15.7)
IRON BINDING CAPACITY (ROCHE): 272 UG/DL (ref 240–430)
IRON SATN MFR SERPL: 10 % (ref 15–46)
IRON SERPL-MCNC: 27 UG/DL (ref 37–145)
MCH RBC QN AUTO: 26.5 PG (ref 26.5–33)
MCHC RBC AUTO-ENTMCNC: 30.9 G/DL (ref 31.5–36.5)
MCV RBC AUTO: 86 FL (ref 78–100)
PHOSPHATE SERPL-MCNC: 3.4 MG/DL (ref 2.5–4.5)
PLATELET # BLD AUTO: 293 10E3/UL (ref 150–450)
POTASSIUM SERPL-SCNC: 5.1 MMOL/L (ref 3.4–5.3)
RBC # BLD AUTO: 3.06 10E6/UL (ref 3.8–5.2)
SODIUM SERPL-SCNC: 129 MMOL/L (ref 135–145)
WBC # BLD AUTO: 10.4 10E3/UL (ref 4–11)

## 2024-07-02 PROCEDURE — 99418 PROLNG IP/OBS E/M EA 15 MIN: CPT | Performed by: PHYSICIAN ASSISTANT

## 2024-07-02 PROCEDURE — 82040 ASSAY OF SERUM ALBUMIN: CPT | Performed by: INTERNAL MEDICINE

## 2024-07-02 PROCEDURE — 99207 PR APP CREDIT; MD BILLING SHARED VISIT: CPT | Performed by: HOSPITALIST

## 2024-07-02 PROCEDURE — 85027 COMPLETE CBC AUTOMATED: CPT | Performed by: INTERNAL MEDICINE

## 2024-07-02 PROCEDURE — 250N000013 HC RX MED GY IP 250 OP 250 PS 637: Performed by: INTERNAL MEDICINE

## 2024-07-02 PROCEDURE — 250N000013 HC RX MED GY IP 250 OP 250 PS 637: Performed by: STUDENT IN AN ORGANIZED HEALTH CARE EDUCATION/TRAINING PROGRAM

## 2024-07-02 PROCEDURE — 120N000001 HC R&B MED SURG/OB

## 2024-07-02 PROCEDURE — 82728 ASSAY OF FERRITIN: CPT | Performed by: INTERNAL MEDICINE

## 2024-07-02 PROCEDURE — 97530 THERAPEUTIC ACTIVITIES: CPT | Mod: GP

## 2024-07-02 PROCEDURE — 83550 IRON BINDING TEST: CPT | Performed by: INTERNAL MEDICINE

## 2024-07-02 PROCEDURE — 250N000013 HC RX MED GY IP 250 OP 250 PS 637: Performed by: PHYSICIAN ASSISTANT

## 2024-07-02 PROCEDURE — 250N000013 HC RX MED GY IP 250 OP 250 PS 637

## 2024-07-02 PROCEDURE — 99233 SBSQ HOSP IP/OBS HIGH 50: CPT | Performed by: PHYSICIAN ASSISTANT

## 2024-07-02 PROCEDURE — 36415 COLL VENOUS BLD VENIPUNCTURE: CPT | Performed by: INTERNAL MEDICINE

## 2024-07-02 PROCEDURE — 99232 SBSQ HOSP IP/OBS MODERATE 35: CPT | Performed by: INTERNAL MEDICINE

## 2024-07-02 PROCEDURE — 97116 GAIT TRAINING THERAPY: CPT | Mod: GP

## 2024-07-02 RX ORDER — NICOTINE POLACRILEX 4 MG
15-30 LOZENGE BUCCAL
Status: DISCONTINUED | OUTPATIENT
Start: 2024-07-02 | End: 2024-07-05 | Stop reason: HOSPADM

## 2024-07-02 RX ORDER — DEXTROSE MONOHYDRATE 25 G/50ML
25-50 INJECTION, SOLUTION INTRAVENOUS
Status: DISCONTINUED | OUTPATIENT
Start: 2024-07-02 | End: 2024-07-05 | Stop reason: HOSPADM

## 2024-07-02 RX ORDER — TAMSULOSIN HYDROCHLORIDE 0.4 MG/1
0.4 CAPSULE ORAL DAILY
Status: DISCONTINUED | OUTPATIENT
Start: 2024-07-02 | End: 2024-07-05 | Stop reason: HOSPADM

## 2024-07-02 RX ADMIN — ACETAMINOPHEN 650 MG: 325 TABLET, FILM COATED ORAL at 09:07

## 2024-07-02 RX ADMIN — APIXABAN 2.5 MG: 2.5 TABLET, FILM COATED ORAL at 09:07

## 2024-07-02 RX ADMIN — SIMVASTATIN 10 MG: 10 TABLET, FILM COATED ORAL at 20:42

## 2024-07-02 RX ADMIN — INSULIN GLARGINE 5 UNITS: 100 INJECTION, SOLUTION SUBCUTANEOUS at 09:08

## 2024-07-02 RX ADMIN — QUETIAPINE 12.5 MG: 25 TABLET, FILM COATED ORAL at 22:35

## 2024-07-02 RX ADMIN — METHOCARBAMOL 250 MG: 500 TABLET ORAL at 09:07

## 2024-07-02 RX ADMIN — ACETAMINOPHEN 650 MG: 325 TABLET, FILM COATED ORAL at 20:46

## 2024-07-02 RX ADMIN — TAMSULOSIN HYDROCHLORIDE 0.4 MG: 0.4 CAPSULE ORAL at 13:54

## 2024-07-02 RX ADMIN — CARVEDILOL 6.25 MG: 3.12 TABLET, FILM COATED ORAL at 09:07

## 2024-07-02 RX ADMIN — Medication 15 G: at 20:42

## 2024-07-02 RX ADMIN — Medication 1 TABLET: at 09:07

## 2024-07-02 RX ADMIN — Medication 15 G: at 13:54

## 2024-07-02 RX ADMIN — APIXABAN 2.5 MG: 2.5 TABLET, FILM COATED ORAL at 20:42

## 2024-07-02 RX ADMIN — CARVEDILOL 6.25 MG: 3.12 TABLET, FILM COATED ORAL at 18:03

## 2024-07-02 ASSESSMENT — ACTIVITIES OF DAILY LIVING (ADL)
ADLS_ACUITY_SCORE: 39
ADLS_ACUITY_SCORE: 35
ADLS_ACUITY_SCORE: 34
ADLS_ACUITY_SCORE: 39
ADLS_ACUITY_SCORE: 35
ADLS_ACUITY_SCORE: 39
ADLS_ACUITY_SCORE: 39
ADLS_ACUITY_SCORE: 34
ADLS_ACUITY_SCORE: 39
ADLS_ACUITY_SCORE: 34
ADLS_ACUITY_SCORE: 34

## 2024-07-02 NOTE — PROGRESS NOTES
CLINICAL NUTRITION SERVICES - REASSESSMENT NOTE    Recommendations Ordered by Registered Dietitian (RD):   Modify supplements from daily Glucerna to chocolate Ensure Max w/ breakfast  Encouraged po intake, emphasizing the importance of protein for recovery     Malnutrition:    % Weight Loss:  None noted  % Intake:  </= 50% for >/= 1 month (severe malnutrition) - chronic  Subcutaneous Fat Loss:  Orbital region severe depletion and Upper arm region severe depletion  Muscle Loss:  Temporal region moderate-severe depletion, Clavicle bone region severe depletion, and Dorsal hand region severe depletion  Fluid Retention:  not meet criteria      Malnutrition Diagnosis: Severe malnutrition  In Context of:  Environmental or social circumstances     EVALUATION OF PROGRESS TOWARD GOALS   Diet: moderate Consistent Carbohydrate, daily Glucerna, and 1500 ml fluid restriction    Intake/Tolerance:  Lisa was happy to report her appetite and intakes have both been improving, saying she's actually hungry in the morning. She was happy her diet and fluid restrictions were liberalized. She hasn't been drinking the daily Glucerna as  its been increasing her blood glucose too much. She was agreeable to switching this to Ensure Max as it only has 7 g CHO (opposed to Glucerna w/ 26 g CHO)   - Flowsheets show a no appetite and 1 intakes/day between %, except 6/28 had 2x intakes of 100%.   - HealthTouch shows a daily supplement sent and meals being sent 2-3x/day, except 6/30 only 1 meal.    ASSESSED NUTRITION NEEDS:  Dosing Weight 48.2 kg  Estimated Energy Needs: 8689-6156 kcals (30-35 Kcal/Kg)  Justification: repletion  Estimated Protein Needs: 70-95 grams protein (1.5-2 g pro/Kg)  Justification: Repletion    NEW FINDINGS:   Weight:   07/02/24 0500 60.2 kg (132 lb 11.5 oz) Bed scale   07/01/24 0549 61.1 kg (134 lb 11.2 oz) Bed scale   06/30/24 0228 60.1 kg (132 lb 7.9 oz) Bed scale   06/29/24 0822 55 kg (121 lb 4.1 oz) Bed scale    06/26/24 0555 48.2 kg (106 lb 4.2 oz) Bed scale     Meds: Medium sliding scale insulin, multivitamin with minerals     Labs: Na 129 (L), BUN 59.5 (H), Cr 1.31 (H), -324 (H)    Previous Goals:   Patient to consume >75% of meals and 1 Glucerna/day  Evaluation: progressing    Previous Nutrition Diagnosis:   Inadequate oral intake related to low appetite at baseline as evidenced by pt report of eating less at baseline, need for ONS to help gain weight and meet needs, 50-75% intakes documented  Evaluation: Improving    CURRENT NUTRITION DIAGNOSIS  Inadequate oral intake related to low appetite at baseline as evidenced by pt report, % intakes and need for oral nutrition supplements to help meet nutrition needs     INTERVENTIONS  Recommendations / Nutrition Prescription  Modify supplements to daily chocolate Ensure Max w/ breakfast  Encouraged po intake, emphasizing the importance of protein for recovery      Implementation  Medical food supplement therapy  General/Healthful diet     Goals  PO - >75% of meal trays TID and 100% of daily supplement    MONITORING AND EVALUATION:  Progress towards goals will be monitored and evaluated per protocol and Practice Guideline    Darrick Waters RD, LD

## 2024-07-02 NOTE — PROGRESS NOTES
Care Management Follow Up    Length of Stay (days): 9    Expected Discharge Date: 07/02/2024     Concerns to be Addressed:       Patient plan of care discussed at interdisciplinary rounds: Yes    Anticipated Discharge Disposition: Transitional Care     Anticipated Discharge Services:    Anticipated Discharge DME: Kye    Patient/family educated on Medicare website which has current facility and service quality ratings: yes  Education Provided on the Discharge Plan:    Patient/Family in Agreement with the Plan: yes    Referrals Placed by CM/SW:    Private pay costs discussed: Not applicable    Additional Information:  Writer spoke with  who states pt not stable for discharge today.   States possible discharge tomorrow if not Thursday.   Writer spoke with [facility] transport Rachid and cancelled transport.  Writer spoke with Regina from [facility] ([phone]) and updated her of no discharge today. Writer updated her that per doctor there is a possibility of discharge tomorrow but if not tomorrow then Thursday. Regina states the ycan accept pt tomorrow but pt must be at facility by 4pm. She states they are not doing admissions on Thursday.   Writer updated Doctor.   Writer updated charge Rn.    Keisha Fitzgerald, EDGARDOW  Social Work  [facility]

## 2024-07-02 NOTE — PROGRESS NOTES
[facility]    Medicine Progress Note - Hospitalist Service    Date of Admission:  6/23/2024    Assessment & Plan   Lisa Meraz is a 89 year old female who presents to the emergency room after mechanical fall. She was attempting to make her bed when she fell. Denies any loss of consciousness, but does state that she hit her head. Found to have left femur fracture. CTH and CT of cervical spine showed no trauma.     Mechanical Fall  CHT  Left IT fracture of proximal femur s/p ORIF left intertrochanteric fracture with short intramedullary nail 6/24/24  -  using prn tylenol, scheduled robaxin, prn oxy   - Bowel regimen   - Orthopedics following peripherally, recommend 2 week follow up with Dr. Richy Dunbar/Reese Lofton PA-C  - Pain control and VTE prophylaxis per orthopedic surgery, now back on PTA Apixiban 2.5 mg bid  - PT/OT/SW following, plan for TCU placement when medically stable      Hyperkalemia,   Hyponatremia  ANDREA on CKD III  NAGMA, resolved  Cachexia   Creatinine trend since admission 1.07 > 1.10 > 1.17 > 1.20 > 1.16 >1.37>1.47>1.3   Na trend 133 down to as low as 121, up to 130, now down trending 127   K+ intermittently elevated, now 5.2  After an initial attempt to diurese patient with 20 mg of IV lasix (due to hypoxia) sodium decreased from 130  to 125.   Urine sodium < 20, Urine osm 500, serum osm isotonic. Suspecting hypovolemic hyponatremia with SIADH from pain  *last received lokelma evening 6/30  *Started on 2% NS 6/29, stopped evening 6/30 with Na 130.  *Cr improved following straight cath, question if urinary retention playing a role in above   -Nephrology following, appreciate assistance.  Increase fluid restriction to 1500cc 7/2. Plan for 2 doses of UreNa. Monitor closely for retention.   --increased fluid restriction to 1500cc 7/2   --holding PTA ACE, diuretic, metformin   - bid BMP  - Nutrition consult placed 6/26    Urinary retention  Required straight cath  for bladder scan 650 on evening 7/1. She did not attempt to void before being straight cathed.  Patient reports she does not feel when she has to void so often goes long periods between using the bathroom.   --staff to assist patient to bathroom/commode AT LEAST once per shift and perform bladder scan afterward to monitor PVR. Straight cath for residuals >200 (ok to let her try again a bit later before cathing). If requires frequent straight cath may need murray.     Leukocytosis, resolved   WBC 10.6 (6/30)>12.6>10.4. no fevers, infectious symptoms. No urinary symptoms.   Monitor for now, recheck CBC in am. Low threshold to obtain UA particularly given question or retention.  Most CXR also had consolidation vs atelectasis R base though no clinical symptoms of pneumonia.   --follow CBC, monitor for fevers     Post-operative Hypoxia, resolved  Right Transudative Pleural effusion s/p US thoracentesis 6/28  Patient has been on continuous oximetry for >24 hours and we have not been able to wean off of oxygen. She continues to require 1 lpm. She consistently drops to the mid 80's on room air. Chest XR showed right lower lobe infiltrate, versus effusion, versus atelectasis. Significantly reduced breath sounds at right lung base. No wheezes, crackles, or rales. She is on DOAC but at lowered dose. Not tachycardic. Currently do not suspect PE. WBC now slightly elevated at 11.6 but patient without infectious symptoms. Chest CT showed large right pleural effusion. S/p US thoracentesis on 6/28. Findings consistent with transudate effusion, perhaps in setting of atelectasis after surgery. BNP is elevated however patient appears more volume down on exam so do not suspect acute HFrEF.  - Repeat chest XR ordered for 6/29, showed improved pleural effusion after the thoracentesis  with improved lung aeration   --currently stable on RA   --encourage IS     Anxiety  Apparently anxious on admission. Calm and cooperative this morning. She  reports prn medication for sleep (seroquel) has been helping a lot.   - Seroquel 6.25 mg BID prn and 12.5 mg at bedtime prn.  - Avoid anticholinergics     Hypertension  Intermittent atrial fibrillation  History of paroxysmal complete heart block s/p dual chamber permament pacemaker 5/10/21  H/o of Severe Mitral regurgitation  Denies any chest pain, palpitations or anginal equivalents on admission. She has a history of nonobstructive carotid artery disease, pulmonary hypertension, mild aortic valve stenosis, severe mitral valve regurgitation with a flail P2 leaflet. She follows with Dr. Kenney at [facility]. She is due for outpatient cardiology follow up and repeat TTE which is being checked every 6 months. EKG with ventricularly paced rhythm. Repeat TTE on 6/27 due to post-operative hypoxia showing new WMA and decrease in EF. However patient    - Now back on DOAC s/p surgery   - Cardiology consulted due to reduced EF and new WMA seen on TTE, recommending GDMT and outpatient cardiology follow up for further risk stratification  - Started on low dose COREG, recommend continuing these on discharge   - Holding PTA chlorthalidone and benazepril given normotension and electrolyte derangements, will hold on discharge until follow up     DM Type II  On metformin 500bid PTA  A1C 8.9  BG quite labile, as high as 300s though 123 overnight   --hold metformin given ANDREA, acidosis   --start lantus 5u daily 7/1   -  continue Mod CHO diet, Increase carb coverage to 1u/10g carb tid with meals and snacks 7/2  - Sliding scale insulin, increase to high scale 7/1  --monitor for hypoglycemia      Hyperlipidemia  - Continue statin     Constipation  - Bowel regimen    Normocytic anemia  Hemoglobin on admission (pre-op) 10.1. has generally been around 9 since that time. Down to 8.1 on 7/2  - Continue to monitor           Diet: Snacks/Supplements Adult: Glucerna; With Meals  Regular Diet Adult  Fluid restriction 1500 ML  FLUID    DVT Prophylaxis: DOAC  Tiwari Catheter: Not present  Lines: None     Cardiac Monitoring: ACTIVE order. Indication: Electrolyte Imbalance (24 hours)- Magnesium <1.3 mg/ml; Potassium < =2.8 or > 5.5 mg/ml  Code Status: Full Code      Clinically Significant Risk Factors        # Hyperkalemia: Highest K = 6.3 mmol/L in last 2 days, will monitor as appropriate  # Hyponatremia: Lowest Na = 122 mmol/L in last 2 days, will monitor as appropriate      # Hypoalbuminemia: Lowest albumin = 2.8 g/dL at 7/2/2024  7:23 AM, will monitor as appropriate        # Chronic heart failure with reduced ejection fraction: last echo with EF <40%              # Severe Malnutrition: based on nutrition assessment           Disposition Plan     Medically Ready for Discharge: Anticipated in 2-4 Days  TCU able to take patient 7/3 or 7/5, 7/5 is more likely at this time          The patient's care was discussed with Dr. Chow who agrees with the above plan     Emely Novoa PA-C  Hospitalist Service  [facility]  Securely message with [employer] (more info)  Text page via [facility] Paging/Directory   ______________________________________________________________________    Interval History   Feeling well today overall. Tired. Pain generally controlled though continues to have intermittent spasms. Required straight cath last night for 650, patient reports she rarely uses the bathroom because she does not feel an urge to so often goes long periods without voiding. Appetite and oral intake is improving. No chest pain, shortness of breath, nausea. No dysuria.       Physical Exam   Vital Signs: Temp: 97.9  F (36.6  C) Temp src: Oral BP: 130/65 Pulse: 73   Resp: 18 SpO2: 90 % O2 Device: None (Room air)    Weight: 132 lbs 11.47 oz    Constitutional: Alert, sitting up in chair. Appears comfortable and is appropriately conversant   ENT:  moist mucous membranes  Respiratory: Lungs with crackles R base. No increased work of  breathing or wheezing  Cardiovascular: Regular rate and rhythm, no significant LE edema   GI:  active bowel sounds, abdomen soft, non-tender  MSK:  dorsi and platar flexion intact. Moves all four extremities   Neuro:  speech is clear. Face symmetric. Follows commands     Medical Decision Making       75 MINUTES SPENT BY ME on the date of service doing chart review, history, exam, documentation & further activities per the note.      Data     I have personally reviewed the following data over the past 24 hrs:    10.4  \   8.1 (L)   / 293     129 (L) 93 (L) 59.5 (H) /  256 (H)   5.1 29 1.31 (H) \     ALT: N/A AST: N/A AP: N/A TBILI: N/A   ALB: 2.8 (L) TOT PROTEIN: N/A LIPASE: N/A       Imaging results reviewed over the past 24 hrs:   No results found for this or any previous visit (from the past 24 hour(s)).

## 2024-07-02 NOTE — PROGRESS NOTES
[facility]     Renal Progress Note       SHORTHAND KEY FOR MY NOTES:  c = with, s = without, p = after, a = before, x = except, asx = asymptomatic, tx = transplant or treatment, sx = symptoms or symptomatic, cx = canceled or culture, rxn = reaction, yday = yesterday, nl = normal, abx = antibiotics, fxn = function, dx = diagnosis, dz = disease, m/h = melena/hematochezia, c/d/l/ha = cramping/dizziness/lightheadedness/headache, d/c = discharge or diarrhea/constipation, f/c/n/v = fevers/chills/nausea/vomiting, cp/sob = chest pain/shortness of breath, tbv = total body volume, rxn = reaction, tdc = tunneled dialysis catheter, pta = prior to admission, hd = hemodialysis, pd = peritoneal dialysis, hhd = home hemodialysis, edw = estimated dry wt         Assessment/Plan:     1.  Severe hyponatremia.  Pt's Na is stable ~130 today.  She is doing ok, in general, but oral intake is still low.  Volume status is ok.  A.  Will give 2 doses of UreNa today.  B.  Will change fluid restriction to 1500 ml every day.  C.  Check labs in the AM.    2.  Met acidosis.  Pt's CO2 has risen from 19 to 29 and K is better.  She rec'd some bicarb earlier this admission, which helped.    A.  Follow labs, clinically each day.    3.  ANDREA/CKD III.  Pt's cr is better today p she was straight cathed.  The ANDREA was prob related to an obstructive component, in part.  Still seems to be on the dry side, intravascularly.  A.  Continue to hold ACEi.  B.  Follow labs daily.    4.  Urinary retention.  Pt states she doesn't get the urge to urinate.  She will try to urinate at least once a shift and then we will check PVR.  A.  Straight cath if PVR > 200.    5.  Anemia.  Pt's hb continues to trend lower each day.  Given the high K, wonder if she has been slowly oozing since the surgery.  She is more tired.  A.  Follow hb, clinically.  B.  Check fe studies.    6.  DM2.  Pt's sugars are better controlled c more insulin.  A.  Glargine + high  insulin sliding scale.  B.  Agree c holding metformin.    7.  L femur fx s/p ORIF.  Pt is not having as many cramps/spasms.  It seems like they were worse when she was a bit dry.  A.  Pain control prn.    8.  FEN.  Other than K and Na, other electrolytes are ok.  A.  Continue same diet.    Case d/w Emely Novoa PA-C, Barney RN, pt/family.        Interval History:     Pt is doing ok, clinically.  No significant confusion, n/v/twitching.  She is retaining urine and doesn't have an urge to urinate, requiring straight cathing yday.          Medications and Allergies:     Current Facility-Administered Medications   Medication Dose Route Frequency Provider Last Rate Last Admin    apixaban ANTICOAGULANT (ELIQUIS) tablet 2.5 mg  2.5 mg Oral BID Reese Lofton PA-C   2.5 mg at 07/02/24 0907    carvedilol (COREG) tablet 6.25 mg  6.25 mg Oral BID w/meals Abbe Helton MD   6.25 mg at 07/02/24 0907    insulin aspart (NovoLOG) injection (RAPID ACTING)  1-10 Units Subcutaneous TID AC Emely Novoa PA-C   5 Units at 07/01/24 1754    insulin aspart (NovoLOG) injection (RAPID ACTING)  1-7 Units Subcutaneous At Bedtime Emely Novoa PA-C   2 Units at 07/01/24 2126    insulin aspart (NovoLOG) injection (RAPID ACTING)   Subcutaneous TID w/meals Jaylen Seymour MD   2 Units at 07/02/24 0915    insulin glargine (LANTUS PEN) injection 5 Units  5 Units Subcutaneous Daily Emely Novoa PA-C   5 Units at 07/02/24 0908    multivitamin w/minerals (THERA-VIT-M) tablet 1 tablet  1 tablet Oral Daily Louis Zuluaga DO   1 tablet at 07/02/24 0907    simvastatin (ZOCOR) tablet 10 mg  10 mg Oral QPM Reese Lofton PA-C   10 mg at 07/01/24 1951    sodium chloride (PF) 0.9% PF flush 3 mL  3 mL Intracatheter Q8H Reese Lofton PA-C   3 mL at 07/02/24 0157     No Known Allergies       Physical Exam:     Vitals were reviewed     , Blood pressure 130/65, pulse 73, temperature 97.9  F (36.6  C),  "temperature source Oral, resp. rate 18, height 1.6 m (5' 3\"), weight 60.2 kg (132 lb 11.5 oz), SpO2 90%.  Wt Readings from Last 3 Encounters:   07/02/24 60.2 kg (132 lb 11.5 oz)     Intake/Output Summary (Last 24 hours) at 7/2/2024 1329  Last data filed at 7/2/2024 0547  Gross per 24 hour   Intake 410 ml   Output 750 ml   Net -340 ml     GENERAL APPEARANCE: pleasant, NAD, alert  RESP: CTA B c good efforts  CV: RRR, nl S1/S2   ABDOMEN: s/nt/nd  EXTREMITIES/SKIN: tr ble edema         Data:     CBC RESULTS:     Recent Labs   Lab 07/02/24  0723 07/01/24  0718 06/30/24  0959 06/28/24  0714 06/27/24  0813 06/26/24  0744   WBC 10.4 12.6* 10.6 9.6 11.6*  --    RBC 3.06* 3.28* 3.29* 3.42* 3.45*  --    HGB 8.1* 8.8* 9.1* 9.5* 9.0* 9.3*   HCT 26.2* 27.8* 28.5* 29.6* 29.5*  --     296 303 266 235  --      Basic Metabolic Panel:  Recent Labs   Lab 07/02/24  1223 07/02/24  0729 07/02/24  0723 07/02/24  0556 07/02/24  0157 07/01/24  2101 07/01/24 2002 07/01/24  1202 07/01/24  0718 07/01/24  0230 06/30/24  2338 06/30/24  2146 06/30/24  1910 06/30/24  1615 06/30/24  1445   NA  --   --  129*  --   --   --  127*  --  130*  --  130*  --  124*  --  122*  122*   POTASSIUM  --   --  5.1  --   --   --  5.3  --  5.2  --  4.5  --  6.3*  --  5.9*   CHLORIDE  --   --  93*  --   --   --  92*  --  93*  --  93*  --  92*  --  91*   CO2  --   --  29  --   --   --  25  --  27  --  28  --  21*  --  19*   BUN  --   --  59.5*  --   --   --  67.8*  --  68.8*  --  58.2*  --  56.7*  --  56.3*   CR  --   --  1.31*  --   --   --  1.50*  --  1.37*  --  1.47*  --  1.44*  --  1.41*   * 133* 133* 142* 124* 231* 226*   < > 171*   < > 106*   < > 247*   < > 156*   JHONATHAN  --   --  8.3  --   --   --  8.0*  --  8.1*  --  7.6*  --  7.8*  --  8.0*    < > = values in this interval not displayed.     INR  No lab results found in last 7 days.     Attestation:   I have reviewed today's relevant vital signs, notes, medications, labs and imaging.    Abdoulaye LEWIS" MD Richar  [facility] Nephrology  [phone]

## 2024-07-02 NOTE — PROGRESS NOTES
"Date & Time: 240730  Summary: Electrolytes imbalance, ORIF left hip.  Orientation/Cognitive: A&Ox4  Mobility Level/Assist Equipment: A2/walker/GB  Fall Risk (Y/N): Yes  Behavior Concerns: Green  Pain Management: Tylenol an Oxycodone  Tele/VS/O2: VSS on RA TELE: V paced  ABNL Lab/BG: NA: 127, K+ 5.3, Creat. 1.50, WBC 12.6, Hgb 8.8, B, 231 & 124.  Diet: 2 gram K, Mod Carb with 1200 ml FR  Bowel/Bladder: Continent, A2/walker/GB to the bedside commode.  Skin Concerns: Left surgical incision site CDI, dressing change once during the shift. Blanchable redness to coccyx.  Drains/Devices: Left PIV SL  Tests/Procedures for next shift: None  Anticipated DC date & active delays: Pending TCU placement based on labs results.  Other important Info: Left incision dressing change one during the shift. Bladder scan for 652 ml and straight cath 750 ml. Will continue to monitor.    /51 (BP Location: Left arm, Patient Position: Sitting, Cuff Size: Adult Small)   Pulse 73   Temp 97.7  F (36.5  C) (Oral)   Resp 18   Ht 1.6 m (5' 3\")   Wt 61.1 kg (134 lb 11.2 oz)   SpO2 99%   BMI 23.86 kg/m        "

## 2024-07-02 NOTE — PROGRESS NOTES
Orthopedic Surgery  Lisa Meraz  07/02/2024     Admit Date:  6/23/2024  POD: 8 Days Post-Op   Procedure(s):  Open reduction internal fixation of left intertrochanteric fracture with short intramedullary nail    Patient using restroom. Ambulating well in room with walker and standby assist.  RN asked ortho to reevaluate given recurrent   Pain controlled to the left hip. Notes itching of left buttock region.  Denies numbness, tingling, and spasms to the LLE.  Tolerating oral intake.    Denies nausea or vomiting.  Denies chest pain or shortness of breath.  No acute events overnight.    Temp:  [97.7  F (36.5  C)-98  F (36.7  C)] 98  F (36.7  C)  Pulse:  [68-73] 69  Resp:  [18] 18  BP: (107-130)/(51-65) 122/62  SpO2:  [90 %-99 %] 92 %    Alert and oriented. Intermittently forgetful. NAD. Non-toxic appearing.   Left hip gauze/tegaderm dressings saturated with serous drainage.  Removed and surgical sites are well-approximately. Scant active weeping.  No erythema of surrounding skin.  Mild swelling to the left lateral hip and buttock.   Thigh remains soft and compressible throughout.  Bilateral calves are soft, non-tender.  Left lower extremity is NVI.  Patient able to resist ankle dorsiflexion and plantar flexion bilaterally.  Able to flex and extend the toes.  DP pulse palpable.  Sensation intact bilateral lower extremities.    Labs/Imaging:  Recent Labs   Lab Test 07/02/24  0723 07/01/24  0718 06/30/24  0959   WBC 10.4 12.6* 10.6   HGB 8.1* 8.8* 9.1*    296 303     Recent Labs   Lab Test 06/23/24  1046   INR 1.01     A/P    1. S/p left intertrochanteric femur fracture ORIF using CM device (DOS: 6/24/24)  -No signs of infection, but probable underlying seroma is cause of new serous weeping over the past few days.   -Continue PTA Eliquis 2.5 mg BID for DVT prophylaxis.    -Hgb stable.  -Mobilize with PT/OT.  -WBAT LLE with walker.    -Continue current pain regimen.  -Dressings: Starting 7/3/24 please complete  daily dressing changes with ABD pads and tape to the left hip surgical sites. Continue until follow up with ortho surgeon at 2 weeks postop. Please contact ortho team with any concerns.  -Follow-up: 2 weeks post-op with Dr. Richy Dunbar/Reese Lofton PA-C    2. Disposition  -Anticipate d/c likely to TCU when medically cleared and progressing in PT. Ortho PA will check in tomorrow.    Lexis Mojica PA-C  [facility]

## 2024-07-03 ENCOUNTER — APPOINTMENT (OUTPATIENT)
Dept: PHYSICAL THERAPY | Facility: CLINIC | Age: 89
DRG: 480 | End: 2024-07-03
Payer: COMMERCIAL

## 2024-07-03 LAB
ALBUMIN SERPL BCG-MCNC: 2.9 G/DL (ref 3.5–5.2)
ANION GAP SERPL CALCULATED.3IONS-SCNC: 8 MMOL/L (ref 7–15)
BACTERIA PLR CULT: NO GROWTH
BUN SERPL-MCNC: 62.1 MG/DL (ref 8–23)
CALCIUM SERPL-MCNC: 8.5 MG/DL (ref 8.2–9.6)
CHLORIDE SERPL-SCNC: 93 MMOL/L (ref 98–107)
CREAT SERPL-MCNC: 1.14 MG/DL (ref 0.51–0.95)
DEPRECATED HCO3 PLAS-SCNC: 26 MMOL/L (ref 22–29)
EGFRCR SERPLBLD CKD-EPI 2021: 46 ML/MIN/1.73M2
ERYTHROCYTE [DISTWIDTH] IN BLOOD BY AUTOMATED COUNT: 15.8 % (ref 10–15)
GLUCOSE BLDC GLUCOMTR-MCNC: 169 MG/DL (ref 70–99)
GLUCOSE BLDC GLUCOMTR-MCNC: 179 MG/DL (ref 70–99)
GLUCOSE BLDC GLUCOMTR-MCNC: 186 MG/DL (ref 70–99)
GLUCOSE BLDC GLUCOMTR-MCNC: 206 MG/DL (ref 70–99)
GLUCOSE BLDC GLUCOMTR-MCNC: 276 MG/DL (ref 70–99)
GLUCOSE SERPL-MCNC: 167 MG/DL (ref 70–99)
GRAM STAIN RESULT: NORMAL
GRAM STAIN RESULT: NORMAL
HCT VFR BLD AUTO: 27.1 % (ref 35–47)
HGB BLD-MCNC: 8.4 G/DL (ref 11.7–15.7)
MCH RBC QN AUTO: 26.7 PG (ref 26.5–33)
MCHC RBC AUTO-ENTMCNC: 31 G/DL (ref 31.5–36.5)
MCV RBC AUTO: 86 FL (ref 78–100)
PHOSPHATE SERPL-MCNC: 3.1 MG/DL (ref 2.5–4.5)
PLATELET # BLD AUTO: 299 10E3/UL (ref 150–450)
POTASSIUM SERPL-SCNC: 5 MMOL/L (ref 3.4–5.3)
RBC # BLD AUTO: 3.15 10E6/UL (ref 3.8–5.2)
SODIUM SERPL-SCNC: 127 MMOL/L (ref 135–145)
WBC # BLD AUTO: 12.1 10E3/UL (ref 4–11)

## 2024-07-03 PROCEDURE — 99232 SBSQ HOSP IP/OBS MODERATE 35: CPT | Performed by: INTERNAL MEDICINE

## 2024-07-03 PROCEDURE — 250N000013 HC RX MED GY IP 250 OP 250 PS 637

## 2024-07-03 PROCEDURE — 250N000011 HC RX IP 250 OP 636: Performed by: INTERNAL MEDICINE

## 2024-07-03 PROCEDURE — 36415 COLL VENOUS BLD VENIPUNCTURE: CPT | Performed by: INTERNAL MEDICINE

## 2024-07-03 PROCEDURE — 99233 SBSQ HOSP IP/OBS HIGH 50: CPT | Performed by: PHYSICIAN ASSISTANT

## 2024-07-03 PROCEDURE — 97530 THERAPEUTIC ACTIVITIES: CPT | Mod: GP | Performed by: PHYSICAL THERAPIST

## 2024-07-03 PROCEDURE — 80069 RENAL FUNCTION PANEL: CPT | Performed by: INTERNAL MEDICINE

## 2024-07-03 PROCEDURE — 85027 COMPLETE CBC AUTOMATED: CPT | Performed by: INTERNAL MEDICINE

## 2024-07-03 PROCEDURE — 120N000001 HC R&B MED SURG/OB

## 2024-07-03 PROCEDURE — 97116 GAIT TRAINING THERAPY: CPT | Mod: GP | Performed by: PHYSICAL THERAPIST

## 2024-07-03 PROCEDURE — 250N000013 HC RX MED GY IP 250 OP 250 PS 637: Performed by: PHYSICIAN ASSISTANT

## 2024-07-03 PROCEDURE — 99418 PROLNG IP/OBS E/M EA 15 MIN: CPT | Performed by: PHYSICIAN ASSISTANT

## 2024-07-03 PROCEDURE — 258N000003 HC RX IP 258 OP 636: Performed by: INTERNAL MEDICINE

## 2024-07-03 PROCEDURE — 250N000013 HC RX MED GY IP 250 OP 250 PS 637: Performed by: INTERNAL MEDICINE

## 2024-07-03 PROCEDURE — 250N000013 HC RX MED GY IP 250 OP 250 PS 637: Performed by: STUDENT IN AN ORGANIZED HEALTH CARE EDUCATION/TRAINING PROGRAM

## 2024-07-03 PROCEDURE — 99207 PR APP CREDIT; MD BILLING SHARED VISIT: CPT | Performed by: HOSPITALIST

## 2024-07-03 RX ORDER — METHOCARBAMOL 500 MG/1
250 TABLET, FILM COATED ORAL 3 TIMES DAILY PRN
Qty: 20 TABLET | Refills: 0 | Status: SHIPPED | OUTPATIENT
Start: 2024-07-03 | End: 2024-09-04

## 2024-07-03 RX ORDER — TAMSULOSIN HYDROCHLORIDE 0.4 MG/1
0.4 CAPSULE ORAL DAILY
DISCHARGE
Start: 2024-07-04 | End: 2024-08-07

## 2024-07-03 RX ORDER — POLYETHYLENE GLYCOL 3350 17 G/17G
17 POWDER, FOR SOLUTION ORAL DAILY PRN
DISCHARGE
Start: 2024-07-03

## 2024-07-03 RX ORDER — METHYLPREDNISOLONE SODIUM SUCCINATE 125 MG/2ML
125 INJECTION, POWDER, LYOPHILIZED, FOR SOLUTION INTRAMUSCULAR; INTRAVENOUS
Status: DISCONTINUED | OUTPATIENT
Start: 2024-07-03 | End: 2024-07-05 | Stop reason: HOSPADM

## 2024-07-03 RX ORDER — DIPHENHYDRAMINE HYDROCHLORIDE 50 MG/ML
50 INJECTION INTRAMUSCULAR; INTRAVENOUS
Status: DISCONTINUED | OUTPATIENT
Start: 2024-07-03 | End: 2024-07-05 | Stop reason: HOSPADM

## 2024-07-03 RX ADMIN — SIMVASTATIN 10 MG: 10 TABLET, FILM COATED ORAL at 20:31

## 2024-07-03 RX ADMIN — IRON SUCROSE 300 MG: 20 INJECTION, SOLUTION INTRAVENOUS at 14:07

## 2024-07-03 RX ADMIN — CARVEDILOL 6.25 MG: 3.12 TABLET, FILM COATED ORAL at 17:59

## 2024-07-03 RX ADMIN — TAMSULOSIN HYDROCHLORIDE 0.4 MG: 0.4 CAPSULE ORAL at 08:57

## 2024-07-03 RX ADMIN — Medication 1 TABLET: at 08:57

## 2024-07-03 RX ADMIN — CARVEDILOL 6.25 MG: 3.12 TABLET, FILM COATED ORAL at 08:57

## 2024-07-03 RX ADMIN — Medication 15 G: at 20:31

## 2024-07-03 RX ADMIN — APIXABAN 2.5 MG: 2.5 TABLET, FILM COATED ORAL at 08:57

## 2024-07-03 RX ADMIN — METFORMIN HYDROCHLORIDE 500 MG: 500 TABLET ORAL at 17:59

## 2024-07-03 RX ADMIN — APIXABAN 2.5 MG: 2.5 TABLET, FILM COATED ORAL at 20:31

## 2024-07-03 RX ADMIN — ACETAMINOPHEN 650 MG: 325 TABLET, FILM COATED ORAL at 18:47

## 2024-07-03 RX ADMIN — METHOCARBAMOL 250 MG: 500 TABLET ORAL at 18:47

## 2024-07-03 RX ADMIN — INSULIN GLARGINE 5 UNITS: 100 INJECTION, SOLUTION SUBCUTANEOUS at 08:57

## 2024-07-03 RX ADMIN — Medication 15 G: at 10:38

## 2024-07-03 RX ADMIN — OXYCODONE HYDROCHLORIDE 5 MG: 5 TABLET ORAL at 20:49

## 2024-07-03 ASSESSMENT — ACTIVITIES OF DAILY LIVING (ADL)
ADLS_ACUITY_SCORE: 34
ADLS_ACUITY_SCORE: 30
ADLS_ACUITY_SCORE: 34
ADLS_ACUITY_SCORE: 34
ADLS_ACUITY_SCORE: 30
ADLS_ACUITY_SCORE: 34
ADLS_ACUITY_SCORE: 30
ADLS_ACUITY_SCORE: 34

## 2024-07-03 NOTE — PLAN OF CARE
Date/Time: 07/03/2024 [id number]     Trauma/Ortho/Medical (Choose one): Ortho     Diagnosis: ORIF L intertrochanteric fracture with short intramedullary nail  POD#: 9  Mental Status:  A and O x 4  Activity/dangle: A 1 GB and walker  Diet: Mod consistent carb diet, Fluid restriction 1500 ml  Pain: PRN Tylenol  Tiwari/Voiding: Bathroom  Tele/Restraints/Iso: Tele, V paced  02/LDA: RA, No PIV. Two surgical incisions to L hip. Dressing change PRN   D/C Date: to tcu 7/5, ride from .   Other Info: Metformin restarted today. Maintain plan of care until TCU transfer

## 2024-07-03 NOTE — PROGRESS NOTES
Care Management Follow Up    Length of Stay (days): 10    Expected Discharge Date: 07/04/2024     Concerns to be Addressed:       Patient plan of care discussed at interdisciplinary rounds: Yes    Anticipated Discharge Disposition: Transitional Care     Anticipated Discharge Services:    Anticipated Discharge DME: Walker    Patient/family educated on Medicare website which has current facility and service quality ratings: yes  Education Provided on the Discharge Plan:    Patient/Family in Agreement with the Plan: yes    Referrals Placed by CM/SW:    Private pay costs discussed: Not applicable    Additional Information:  Writer sent message to doctor Novoa to see if pt is stable for discharge today. Writer will await response.     Addendum: Doctor states she thinks possible discharge today. She is waiting to talk to pt /family. Writer called [facility] to inquire about what the latest time that pt could return is. No answer. Writer left voicemail asking for call back.     Writer spoke with  Who states pt stable for discharge and she will discharge pt today.   Writer called [facility] as writer still has not heard back. Writer spoke with admissions who states that they are not able to take pt now due to staffing. Writer did inform them that writer did call right away in the morning to try and get things set up for discharge and had not heard back from them. Admissions apologized for this. Admissions states they cannot take pt until Friday. They are agreeable for a discharge around noon.    Writer updated doctor and bedside Rn.  Writer received voicemail from pt's daughter Regina wanting an update. Bedside RN states that he spoke with daughter Regina as well and she was hoping to talk to .   Writer called Regina and updated her regarding no discharge today. Regina states understanding. Regina is agreeable for a noon or so discharge on Friday.  Writer spoke with bedside RN who states pt can transport via  wheelchair and she is not on oxygen.   Writer spoke with nelsy in [facility] transport and set up a wheelchair transport for pt for [id number].  Writer called [facility]. No answer. Writer left a voicemail with update of discharge / transport time and asked for a call back right away verifying this time works for them on Friday. Writer updated doctor of plan/ transport time for Friday and asked that orders be done by 10am. Doctor states understanding.     Keisha Fitzgerald, EDGARDOW  Social Work  [facility]

## 2024-07-03 NOTE — PROGRESS NOTES
[facility]    Medicine Progress Note - Hospitalist Service    Date of Admission:  6/23/2024    Assessment & Plan   Lisa Meraz is a 89 year old female who presents to the emergency room after mechanical fall. She was attempting to make her bed when she fell. Denies any loss of consciousness, but does state that she hit her head. Found to have left femur fracture. CTH and CT of cervical spine showed no trauma.     Discharge orders completed 7/3, will need to adjust timing of follow up labs     Mechanical Fall  CHT  Left IT fracture of proximal femur s/p ORIF left intertrochanteric fracture with short intramedullary nail 6/24/24  -  using prn tylenol, scheduled robaxin, prn oxy   - Bowel regimen   - Orthopedics following peripherally, recommend 2 week follow up with Dr. Richy Dunbar/Reese Lofton PA-C  - Pain control and VTE prophylaxis per orthopedic surgery, now back on PTA Apixiban 2.5 mg bid  - PT/OT/SW following, plan for TCU placement when medically stable   -increase in serous drainage noted 7/2, underlying seroma suspected by Ortho. Recommend daily dressing hanges with ABD pads to surgical site.      Hyperkalemia,   Hyponatremia  ANDREA on CKD III  NAGMA, resolved  Cachexia   Creatinine trend since admission 1.07 > 1.10 > 1.17 > 1.20 > 1.16 >1.37>1.47>1.3>1.1   Na trend 133 down to as low as 121, up to 130, now down trending 127   K+ intermittently elevated, now 5.2  After an initial attempt to diurese patient with 20 mg of IV lasix (due to hypoxia) sodium decreased from 130  to 125.   Urine sodium < 20, Urine osm 500, serum osm isotonic. Suspecting hypovolemic hyponatremia with SIADH from pain  *last received lokelma evening 6/30, K has been stable since   *Started on 2% NS 6/29, stopped evening 6/30 with Na 130.  *Cr improved following straight cath, question if urinary retention playing a role in above   -Nephrology following, appreciate assistance.  Increase fluid  restriction to 1500cc 7/2.Monitor closely for retention.   --increased fluid restriction to 1500cc 7/2   --continue Ure Na bid   --holding PTA ACE, diuretic  - BMP in am   - Nutrition consult placed 6/26    Urinary retention  Required straight cath for bladder scan 650 on evening 7/1. She did not attempt to void before being straight cathed.  Patient reports she does not feel when she has to void so often goes long periods between using the bathroom.   --staff to assist patient to bathroom/commode AT LEAST once per shift and perform bladder scan afterward to monitor PVR. Straight cath for residuals >200 (ok to let her try again a bit later before cathing). If requires frequent straight cath may need murray.     Leukocytosis  WBC 10.6 (6/30)>12.6>10.4>.12.1 no fevers, infectious symptoms. No urinary symptoms.   Monitor for now, recheck CBC in am. Low threshold to obtain UA particularly given question or retention.  Most CXR also had consolidation vs atelectasis R base though no clinical symptoms of pneumonia.   --follow CBC, monitor for fevers     Post-operative Hypoxia, resolved  Right Transudative Pleural effusion s/p US thoracentesis 6/28  Patient has been on continuous oximetry for >24 hours and we have not been able to wean off of oxygen. She continues to require 1 lpm. She consistently drops to the mid 80's on room air. Chest XR showed right lower lobe infiltrate, versus effusion, versus atelectasis. Significantly reduced breath sounds at right lung base. No wheezes, crackles, or rales. She is on DOAC but at lowered dose. Not tachycardic. Currently do not suspect PE. WBC now slightly elevated at 11.6 but patient without infectious symptoms. Chest CT showed large right pleural effusion. S/p US thoracentesis on 6/28. Findings consistent with transudate effusion, perhaps in setting of atelectasis after surgery. BNP is elevated however patient appears more volume down on exam so do not suspect acute HFrEF.  - Repeat  chest XR ordered for 6/29, showed improved pleural effusion after the thoracentesis  with improved lung aeration   --currently stable on RA   --encourage IS     Anxiety  Apparently anxious on admission. Calm and cooperative this morning. She reports prn medication for sleep (seroquel) has been helping a lot.   - Seroquel 6.25 mg BID prn and 12.5 mg at bedtime prn.  - Avoid anticholinergics     Hypertension  Intermittent atrial fibrillation  History of paroxysmal complete heart block s/p dual chamber permament pacemaker 5/10/21  H/o of Severe Mitral regurgitation  Denies any chest pain, palpitations or anginal equivalents on admission. She has a history of nonobstructive carotid artery disease, pulmonary hypertension, mild aortic valve stenosis, severe mitral valve regurgitation with a flail P2 leaflet. She follows with Dr. Kenney at [facility]. She is due for outpatient cardiology follow up and repeat TTE which is being checked every 6 months. EKG with ventricularly paced rhythm. Repeat TTE on 6/27 due to post-operative hypoxia showing new WMA and decrease in EF. However patient    - Now back on DOAC s/p surgery   - Cardiology consulted due to reduced EF and new WMA seen on TTE, recommending GDMT and outpatient cardiology follow up for further risk stratification  - Started on low dose COREG, recommend continuing these on discharge   - Holding PTA chlorthalidone and benazepril given normotension and electrolyte derangements, will hold on discharge until follow up     DM Type II  On metformin 500bid PTA  A1C 8.9  BG quite labile, as high as 300s though 123 overnight   --resume metformin 7/3  --start lantus 5u daily 7/1   -  continue Mod CHO diet, Increase carb coverage to 1u/10g carb tid with meals and snacks 7/2  - Sliding scale insulin, increase to high scale 7/1  --monitor for hypoglycemia      Hyperlipidemia  - Continue statin     Constipation  - Bowel regimen    Normocytic anemia  Iron  deficiency   Hemoglobin on admission (pre-op) 10.1. has generally been around 9 since that time. Down stable around 8.   - plan for IV iron while in hospital           Diet: Fluid restriction 1500 ML FLUID  Moderate Consistent Carb (60 g CHO per Meal) Diet  Snacks/Supplements Adult: Ensure Max Protein (bariatric); With Meals    DVT Prophylaxis: DOAC  Tiwari Catheter: Not present  Lines: None     Cardiac Monitoring: ACTIVE order. Indication: Electrolyte Imbalance (24 hours)- Magnesium <1.3 mg/ml; Potassium < =2.8 or > 5.5 mg/ml  Code Status: Full Code      Clinically Significant Risk Factors         # Hyponatremia: Lowest Na = 127 mmol/L in last 2 days, will monitor as appropriate      # Hypoalbuminemia: Lowest albumin = 2.8 g/dL at 7/2/2024  7:23 AM, will monitor as appropriate      # Chronic heart failure with reduced ejection fraction: last echo with EF <40%              # Severe Malnutrition: based on nutrition assessment           Disposition Plan     Medically Ready for Discharge: 7/5 to TCU        The patient's care was discussed with Dr Zavala who agrees with the above plan     Emely Novoa PA-C  Hospitalist Service  [facility]  Securely message with [place] (more info)  Text page via [email or web address] Paging/Directory   ______________________________________________________________________    Interval History   Doing well today. Spasms improving. Denies CP, SOB, fever, chills. Eating well. Reports voiding several times overnight. Has not needed straight cath some PVR borderline this am around 200.     Physical Exam   Temp: 97.7  F (36.5  C) Temp src: Oral BP: 133/63 Pulse: 64   Resp: 16 SpO2: 97 % O2 Device: None (Room air)    Vitals:    07/01/24 0549 07/02/24 0500 07/03/24 0500   Weight: 61.1 kg (134 lb 11.2 oz) 60.2 kg (132 lb 11.5 oz) 61 kg (134 lb 7.7 oz)     Vital Signs with Ranges  Temp:  [97.7  F (36.5  C)-98.3  F (36.8  C)] 97.7  F (36.5  C)  Pulse:  [61-69] 64  Resp:  [16-18]  16  BP: (120-133)/(45-63) 133/63  SpO2:  [92 %-98 %] 97 %  I/O last 3 completed shifts:  In: -   Out: 650 [Urine:650]    Constitutional: Alert, sitting up in chair. Appears comfortable and is appropriately conversant   ENT:  moist mucous membranes  Respiratory: Lungs with crackles R base. No increased work of breathing or wheezing  Cardiovascular: Regular rate and rhythm, no significant LE edema   GI:  active bowel sounds, abdomen soft, non-tender  MSK:  dorsi and platar flexion intact. Moves all four extremities   Neuro:  speech is clear. Face symmetric. Follows commands        Medical Decision Making       65 MINUTES SPENT BY ME on the date of service doing chart review, history, exam, documentation & further activities per the note.      Data     I have personally reviewed the following data over the past 24 hrs:    12.1 (H)  \   8.4 (L)   / 299     127 (L) 93 (L) 62.1 (H) /  169 (H)   5.0 26 1.14 (H) \     ALT: N/A AST: N/A AP: N/A TBILI: N/A   ALB: 2.9 (L) TOT PROTEIN: N/A LIPASE: N/A     Ferritin:  N/A % Retic:  N/A LDH:  N/A       Imaging results reviewed over the past 24 hrs:   No results found for this or any previous visit (from the past 24 hour(s)).

## 2024-07-03 NOTE — PROGRESS NOTES
Orthopedic Surgery  Lisa Meraz  07/03/2024     Admit Date:  6/23/2024  POD: 9 Days Post-Op   Procedure(s):  Open reduction internal fixation of left intertrochanteric fracture with short intramedullary nail    Patient resting comfortably in chair. We are monitoring serous weeping of surgical site.   Pain controlled to the left hip. Notes itching of left buttock region,improved with heat application  Tolerating oral intake.    Denies nausea or vomiting.  Denies chest pain or shortness of breath.  No acute events overnight.    Temp:  [97.7  F (36.5  C)-98.3  F (36.8  C)] 97.7  F (36.5  C)  Pulse:  [61-69] 64  Resp:  [16-18] 16  BP: (120-133)/(45-63) 133/63  SpO2:  [92 %-98 %] 97 %    Alert and oriented.  Left hip gauze/tegaderm dressings saturated with serous drainage.  Removed and surgical sites are well-approximately. Scant active weeping.  No erythema of surrounding skin.  Mild swelling to the left lateral hip and buttock.   Bilateral calves are soft, non-tender.  Left lower extremity is NVI.  Patient able to resist ankle dorsiflexion and plantar flexion bilaterally.  DP pulse palpable.  Sensation intact bilateral lower extremities.    Labs/Imaging:  Recent Labs   Lab Test 07/03/24  0710 07/02/24  0723 07/01/24  0718   WBC 12.1* 10.4 12.6*   HGB 8.4* 8.1* 8.8*    293 296     Recent Labs   Lab Test 06/23/24  1046   INR 1.01     A/P    1. S/p left intertrochanteric femur fracture ORIF using CM device (DOS: 6/24/24)  - underlying seroma is cause of new serous weeping over the past few days.   -Continue PTA Eliquis 2.5 mg BID for DVT prophylaxis.    -Hgb stable.  -Mobilize with PT/OT.  -WBAT LLE with walker.    -Continue current pain regimen.  -Dressings: Starting 7/3/24 please complete daily dressing changes with ABD pads and tape to the left hip surgical sites. Continue until follow up with ortho surgeon at 2 weeks postop. Please contact ortho team with any concerns.  -Follow-up: 2 weeks post-op with   Richy Dunbar/Reese Lofton PA-C    2. Disposition  -Anticipate d/c likely to TCU when medically cleared and progressing in PT.   [facility]

## 2024-07-03 NOTE — PLAN OF CARE
Goal Outcome Evaluation:      Plan of Care Reviewed With: patient    Overall Patient Progress: improvingOverall Patient Progress: improving          Date/Time: 07/02/2024 [id number]     Trauma/Ortho/Medical (Choose one): Ortho     Diagnosis: ORIF L intertrochanteric fracture with short intramedullary nail  POD#: 9  Mental Status:  A and O x 4  Activity/dangle: A 1 GB and walker  Diet: Mod consistent carb diet, Fluid restriction 1500 ml  Pain: PRN Tylenol  Tiwari/Voiding: Bathroom  Tele/Restraints/Iso: Tele, V paced  02/LDA:   PIV SL R lower forearm  D/C Date: Pendng TCU placement.  Other Info: PRN Seroquel given. Dressing changed due to large serous drainage. VSS on RA. Nephro following

## 2024-07-03 NOTE — PROGRESS NOTES
[facility]     Renal Progress Note       SHORTHAND KEY FOR MY NOTES:  c = with, s = without, p = after, a = before, x = except, asx = asymptomatic, tx = transplant or treatment, sx = symptoms or symptomatic, cx = canceled or culture, rxn = reaction, yday = yesterday, nl = normal, abx = antibiotics, fxn = function, dx = diagnosis, dz = disease, m/h = melena/hematochezia, c/d/l/ha = cramping/dizziness/lightheadedness/headache, d/c = discharge or diarrhea/constipation, f/c/n/v = fevers/chills/nausea/vomiting, cp/sob = chest pain/shortness of breath, tbv = total body volume, rxn = reaction, tdc = tunneled dialysis catheter, pta = prior to admission, hd = hemodialysis, pd = peritoneal dialysis, hhd = home hemodialysis, edw = estimated dry wt         Assessment/Plan:     1.  Severe hyponatremia.  Pt's Na is stable in the 128-130 range.  She still has some discomfort, which is prob causing an ADH release.  She is TBV up now, but it's appropriate for the hypoalbuminemia.  She does not need diuretics yet.    A.  Continue UreNA 15g bid.  B.  Check BMP on Friday and Monday and send results to our office.  Fax - [phone]  C.  Continue fluid restriction 1500 ml every day.  D.  Pt should not be on a thiazide diuretic ever again.  If she does need one, we will give her a loop diuretic.  E.  Based on results, we will arrange a hosp follow-up in our clinic.  Family is aware.    2.  ANDREA/CKD III.  Pt's cr is improving daily and is down to 1.1 p the urinary retention improved.  It will be important for her to attempt to urinate every few hrs while awake, even if she doesn't have the urge since she still has PVRs ~ 200 ml.  A.  Continue to hold ACEi.  B.  Follow labs.    3.  Fe def anemia.  Pt's hb remains low.  She is also quite fe def and is tired.  A.  Venofer 300 x 3.  B.  Follow hb, clinically.    4.  DM2.  Pt's sugars are still high, but better controlled c more insulin.    A.  Glargine + high insulin  "sliding scale.  B.  Ok to resume metformin if desired.  Will defer to Hosp team.    5.  L femur fx s/p ORIF.  Pt's pain is decently controlled.   A.  Pain control prn.    6.  FEN.  Electrolytes are ok.  A.  Continue same diet.    Case d/w Emely Novoa PA-C, Barney RN, pt/family.        Interval History:     Pt feels fine.  She's tired today \"bc I was up all night urinating.\"  She was started on tamsulosin yday.  No n/v/confusion.  She has developed some leg swelling.          Medications and Allergies:     Current Facility-Administered Medications   Medication Dose Route Frequency Provider Last Rate Last Admin    apixaban ANTICOAGULANT (ELIQUIS) tablet 2.5 mg  2.5 mg Oral BID Reese Lofton PA-C   2.5 mg at 07/03/24 0857    carvedilol (COREG) tablet 6.25 mg  6.25 mg Oral BID w/meals Abbe Helton MD   6.25 mg at 07/03/24 0857    insulin aspart (NovoLOG) injection (RAPID ACTING)  1-10 Units Subcutaneous TID AC Emely Novoa PA-C   2 Units at 07/03/24 0857    insulin aspart (NovoLOG) injection (RAPID ACTING)  1-7 Units Subcutaneous At Bedtime Emely Novoa PA-C   1 Units at 07/02/24 2156    insulin aspart (NovoLOG) injection (RAPID ACTING)   Subcutaneous TID w/meals Emely Novoa PA-C   4 Units at 07/03/24 0858    insulin glargine (LANTUS PEN) injection 5 Units  5 Units Subcutaneous Daily Emely Novoa PA-C   5 Units at 07/03/24 0857    multivitamin w/minerals (THERA-VIT-M) tablet 1 tablet  1 tablet Oral Daily Louis Zuluaga,    1 tablet at 07/03/24 0857    simvastatin (ZOCOR) tablet 10 mg  10 mg Oral QPM Reese Lofton PA-C   10 mg at 07/02/24 2042    sodium chloride (PF) 0.9% PF flush 3 mL  3 mL Intracatheter Q8H Reese Lofton PA-C   3 mL at 07/03/24 1039    tamsulosin (FLOMAX) capsule 0.4 mg  0.4 mg Oral Daily Emely Novoa PA-C   0.4 mg at 07/03/24 0857    Urea (URE-NA) packet 15 g  15 g Oral BID Abdoulaye Mcqueen MD   15 g at 07/03/24 1038     No " "Known Allergies       Physical Exam:     Vitals were reviewed     , Blood pressure 133/63, pulse 64, temperature 97.7  F (36.5  C), temperature source Oral, resp. rate 16, height 1.6 m (5' 3\"), weight 61 kg (134 lb 7.7 oz), SpO2 97%.  Wt Readings from Last 3 Encounters:   07/03/24 61 kg (134 lb 7.7 oz)     Intake/Output Summary (Last 24 hours) at 7/3/2024 1239  Last data filed at 7/3/2024 1054  Gross per 24 hour   Intake --   Output 1100 ml   Net -1100 ml     GENERAL APPEARANCE: pleasant, NAD, alert  RESP: CTA B c good efforts  CV: RRR, nl S1/S2   ABDOMEN: s/nt/nd  EXTREMITIES/SKIN: 1+ ble edema         Data:     CBC RESULTS:     Recent Labs   Lab 07/03/24  0710 07/02/24  0723 07/01/24  0718 06/30/24  0959 06/28/24  0714 06/27/24  0813   WBC 12.1* 10.4 12.6* 10.6 9.6 11.6*   RBC 3.15* 3.06* 3.28* 3.29* 3.42* 3.45*   HGB 8.4* 8.1* 8.8* 9.1* 9.5* 9.0*   HCT 27.1* 26.2* 27.8* 28.5* 29.6* 29.5*    293 296 303 266 235     Basic Metabolic Panel:  Recent Labs   Lab 07/03/24  0718 07/03/24  0710 07/03/24  0151 07/02/24  2159 07/02/24  1725 07/02/24  1223 07/02/24  0729 07/02/24  0723 07/01/24  2101 07/01/24 2002 07/01/24  1202 07/01/24  0718 07/01/24  0230 06/30/24  2338 06/30/24  2146 06/30/24  1910   NA  --  127*  --   --   --   --   --  129*  --  127*  --  130*  --  130*  --  124*   POTASSIUM  --  5.0  --   --   --   --   --  5.1  --  5.3  --  5.2  --  4.5  --  6.3*   CHLORIDE  --  93*  --   --   --   --   --  93*  --  92*  --  93*  --  93*  --  92*   CO2  --  26  --   --   --   --   --  29  --  25  --  27  --  28  --  21*   BUN  --  62.1*  --   --   --   --   --  59.5*  --  67.8*  --  68.8*  --  58.2*  --  56.7*   CR  --  1.14*  --   --   --   --   --  1.31*  --  1.50*  --  1.37*  --  1.47*  --  1.44*   * 167* 179* 220* 176* 256*   < > 133*   < > 226*   < > 171*   < > 106*   < > 247*   JHONATHAN  --  8.5  --   --   --   --   --  8.3  --  8.0*  --  8.1*  --  7.6*  --  7.8*    < > = values in this interval not " displayed.     INR  No lab results found in last 7 days.     Attestation:   I have reviewed today's relevant vital signs, notes, medications, labs and imaging.    Abdoulaye Mcqueen MD  [facility] - Nephrology  [phone]

## 2024-07-04 ENCOUNTER — APPOINTMENT (OUTPATIENT)
Dept: GENERAL RADIOLOGY | Facility: CLINIC | Age: 89
DRG: 480 | End: 2024-07-04
Attending: STUDENT IN AN ORGANIZED HEALTH CARE EDUCATION/TRAINING PROGRAM
Payer: COMMERCIAL

## 2024-07-04 LAB
ALBUMIN SERPL BCG-MCNC: 2.9 G/DL (ref 3.5–5.2)
ALBUMIN UR-MCNC: 30 MG/DL
ANION GAP SERPL CALCULATED.3IONS-SCNC: 6 MMOL/L (ref 7–15)
APPEARANCE UR: ABNORMAL
BACTERIA #/AREA URNS HPF: ABNORMAL /HPF
BILIRUB UR QL STRIP: NEGATIVE
BUN SERPL-MCNC: 72.2 MG/DL (ref 8–23)
CALCIUM SERPL-MCNC: 8.8 MG/DL (ref 8.2–9.6)
CHLORIDE SERPL-SCNC: 92 MMOL/L (ref 98–107)
COLOR UR AUTO: YELLOW
CREAT SERPL-MCNC: 1.07 MG/DL (ref 0.51–0.95)
DEPRECATED HCO3 PLAS-SCNC: 29 MMOL/L (ref 22–29)
EGFRCR SERPLBLD CKD-EPI 2021: 49 ML/MIN/1.73M2
ERYTHROCYTE [DISTWIDTH] IN BLOOD BY AUTOMATED COUNT: 15.9 % (ref 10–15)
GLUCOSE BLDC GLUCOMTR-MCNC: 101 MG/DL (ref 70–99)
GLUCOSE BLDC GLUCOMTR-MCNC: 115 MG/DL (ref 70–99)
GLUCOSE BLDC GLUCOMTR-MCNC: 120 MG/DL (ref 70–99)
GLUCOSE BLDC GLUCOMTR-MCNC: 247 MG/DL (ref 70–99)
GLUCOSE BLDC GLUCOMTR-MCNC: 98 MG/DL (ref 70–99)
GLUCOSE SERPL-MCNC: 113 MG/DL (ref 70–99)
GLUCOSE UR STRIP-MCNC: NEGATIVE MG/DL
HCT VFR BLD AUTO: 28.4 % (ref 35–47)
HGB BLD-MCNC: 8.8 G/DL (ref 11.7–15.7)
HGB UR QL STRIP: ABNORMAL
KETONES UR STRIP-MCNC: NEGATIVE MG/DL
LEUKOCYTE ESTERASE UR QL STRIP: ABNORMAL
MCH RBC QN AUTO: 26.8 PG (ref 26.5–33)
MCHC RBC AUTO-ENTMCNC: 31 G/DL (ref 31.5–36.5)
MCV RBC AUTO: 87 FL (ref 78–100)
MUCOUS THREADS #/AREA URNS LPF: PRESENT /LPF
NITRATE UR QL: POSITIVE
PH UR STRIP: 6 [PH] (ref 5–7)
PHOSPHATE SERPL-MCNC: 3.3 MG/DL (ref 2.5–4.5)
PLATELET # BLD AUTO: 313 10E3/UL (ref 150–450)
POTASSIUM SERPL-SCNC: 4.9 MMOL/L (ref 3.4–5.3)
RBC # BLD AUTO: 3.28 10E6/UL (ref 3.8–5.2)
RBC URINE: 10 /HPF
SODIUM SERPL-SCNC: 127 MMOL/L (ref 135–145)
SP GR UR STRIP: 1.02 (ref 1–1.03)
SQUAMOUS EPITHELIAL: 2 /HPF
UROBILINOGEN UR STRIP-MCNC: NORMAL MG/DL
WBC # BLD AUTO: 19.6 10E3/UL (ref 4–11)
WBC CLUMPS #/AREA URNS HPF: PRESENT /HPF
WBC URINE: >182 /HPF

## 2024-07-04 PROCEDURE — 87186 SC STD MICRODIL/AGAR DIL: CPT | Performed by: STUDENT IN AN ORGANIZED HEALTH CARE EDUCATION/TRAINING PROGRAM

## 2024-07-04 PROCEDURE — 120N000001 HC R&B MED SURG/OB

## 2024-07-04 PROCEDURE — 999N000128 HC STATISTIC PERIPHERAL IV START W/O US GUIDANCE

## 2024-07-04 PROCEDURE — 250N000013 HC RX MED GY IP 250 OP 250 PS 637: Performed by: STUDENT IN AN ORGANIZED HEALTH CARE EDUCATION/TRAINING PROGRAM

## 2024-07-04 PROCEDURE — 250N000013 HC RX MED GY IP 250 OP 250 PS 637

## 2024-07-04 PROCEDURE — 250N000013 HC RX MED GY IP 250 OP 250 PS 637: Performed by: INTERNAL MEDICINE

## 2024-07-04 PROCEDURE — 250N000013 HC RX MED GY IP 250 OP 250 PS 637: Performed by: PHYSICIAN ASSISTANT

## 2024-07-04 PROCEDURE — 258N000003 HC RX IP 258 OP 636: Performed by: INTERNAL MEDICINE

## 2024-07-04 PROCEDURE — 87086 URINE CULTURE/COLONY COUNT: CPT | Performed by: STUDENT IN AN ORGANIZED HEALTH CARE EDUCATION/TRAINING PROGRAM

## 2024-07-04 PROCEDURE — 85027 COMPLETE CBC AUTOMATED: CPT | Performed by: INTERNAL MEDICINE

## 2024-07-04 PROCEDURE — 99232 SBSQ HOSP IP/OBS MODERATE 35: CPT | Performed by: STUDENT IN AN ORGANIZED HEALTH CARE EDUCATION/TRAINING PROGRAM

## 2024-07-04 PROCEDURE — 81001 URINALYSIS AUTO W/SCOPE: CPT | Performed by: STUDENT IN AN ORGANIZED HEALTH CARE EDUCATION/TRAINING PROGRAM

## 2024-07-04 PROCEDURE — 71046 X-RAY EXAM CHEST 2 VIEWS: CPT

## 2024-07-04 PROCEDURE — 250N000011 HC RX IP 250 OP 636: Performed by: INTERNAL MEDICINE

## 2024-07-04 PROCEDURE — 82040 ASSAY OF SERUM ALBUMIN: CPT | Performed by: INTERNAL MEDICINE

## 2024-07-04 PROCEDURE — 36415 COLL VENOUS BLD VENIPUNCTURE: CPT | Performed by: INTERNAL MEDICINE

## 2024-07-04 RX ADMIN — ACETAMINOPHEN 650 MG: 325 TABLET, FILM COATED ORAL at 13:59

## 2024-07-04 RX ADMIN — METFORMIN HYDROCHLORIDE 500 MG: 500 TABLET ORAL at 17:44

## 2024-07-04 RX ADMIN — APIXABAN 2.5 MG: 2.5 TABLET, FILM COATED ORAL at 21:06

## 2024-07-04 RX ADMIN — METFORMIN HYDROCHLORIDE 500 MG: 500 TABLET ORAL at 08:06

## 2024-07-04 RX ADMIN — METHOCARBAMOL 250 MG: 500 TABLET ORAL at 03:14

## 2024-07-04 RX ADMIN — QUETIAPINE 12.5 MG: 25 TABLET, FILM COATED ORAL at 23:13

## 2024-07-04 RX ADMIN — INSULIN GLARGINE 5 UNITS: 100 INJECTION, SOLUTION SUBCUTANEOUS at 08:08

## 2024-07-04 RX ADMIN — OXYCODONE HYDROCHLORIDE 5 MG: 5 TABLET ORAL at 21:06

## 2024-07-04 RX ADMIN — Medication 15 G: at 21:06

## 2024-07-04 RX ADMIN — Medication 1 TABLET: at 08:06

## 2024-07-04 RX ADMIN — TAMSULOSIN HYDROCHLORIDE 0.4 MG: 0.4 CAPSULE ORAL at 08:06

## 2024-07-04 RX ADMIN — IRON SUCROSE 300 MG: 20 INJECTION, SOLUTION INTRAVENOUS at 08:49

## 2024-07-04 RX ADMIN — APIXABAN 2.5 MG: 2.5 TABLET, FILM COATED ORAL at 08:06

## 2024-07-04 RX ADMIN — CARVEDILOL 6.25 MG: 3.12 TABLET, FILM COATED ORAL at 08:06

## 2024-07-04 RX ADMIN — SIMVASTATIN 10 MG: 10 TABLET, FILM COATED ORAL at 21:06

## 2024-07-04 RX ADMIN — CARVEDILOL 6.25 MG: 3.12 TABLET, FILM COATED ORAL at 17:44

## 2024-07-04 ASSESSMENT — ACTIVITIES OF DAILY LIVING (ADL)
ADLS_ACUITY_SCORE: 38
ADLS_ACUITY_SCORE: 39
ADLS_ACUITY_SCORE: 38
ADLS_ACUITY_SCORE: 39
ADLS_ACUITY_SCORE: 38
ADLS_ACUITY_SCORE: 39
ADLS_ACUITY_SCORE: 38
ADLS_ACUITY_SCORE: 39
ADLS_ACUITY_SCORE: 39
ADLS_ACUITY_SCORE: 38

## 2024-07-04 NOTE — PLAN OF CARE
Goal Outcome Evaluation:         Diagnosis: ORIF L intertrochanteric fracture with short intramedullary nail  POD#: 9  Mental Status:  A and O x 4  Activity/dangle: A 1 GB and walker -weakness noted  Diet: Mod consistent carb diet, Fluid restriction 1500 ml- pt aware  Pain: PRN Oxycodone given  Tiwari/Voiding: Bathroom  Tele/Restrain; N/A  02/LDA:   PIV SL R lower forearm  D/C Date: Pendng TCU placement.  Other Info: continue POC till TCU placement

## 2024-07-04 NOTE — PLAN OF CARE
Diagnosis: L hip ORIF with nail  POD#: 10  Mental Status: A&Ox4  Activity/dangle up 1 GB walker  Diet: mod carb, 1500mL fluid restriction  Pain: robaxin  Tiwari/Voiding: BR  02/LDA: RA. No Iv access  D/C Date: pending TCU  Other Info: Dressing changed due to large amount drainage, blood sugar checks.

## 2024-07-04 NOTE — PROGRESS NOTES
[facility]    Medicine Progress Note - Hospitalist Service    Date of Admission:  6/23/2024    Assessment & Plan     Lisa Meraz is a 89 year old female who presents to the emergency room after mechanical fall. She was attempting to make her bed when she fell. Denies any loss of consciousness, but does state that she hit her head. Found to have left femur fracture. CTH and CT of cervical spine showed no trauma.     Mechanical Fall  CHT  Left IT fracture of proximal femur s/p ORIF left intertrochanteric fracture with short intramedullary nail 6/24/24  - Using prn tylenol, scheduled robaxin, prn oxy   - Bowel regimen   - Orthopedics following peripherally, recommend 2 week follow up with Dr. Richy Dunbar/Reese Lofton PA-C  - Pain control and VTE prophylaxis per orthopedic surgery, now back on PTA Apixiban 2.5 mg bid  - PT/OT/SW following, plan for TCU placement when medically stable   - Increase in serous drainage noted 7/2, underlying seroma suspected by Ortho. Recommend daily dressing changes with ABD pads to surgical site. Patient now with worsening leukocytosis, afebrile, I will want orthopedics team to examine the wound again before Lisa tentatively discharges on 7/5/24    Leukocytosis  Urinary retention  Required straight cath for bladder scan 650 on evening 7/1. She did not attempt to void before being straight cathed.  Patient reports she does not feel when she has to void so often goes long periods between using the bathroom. WBC 10.6 (6/30)>12.6>10.4>.12.1 > 19. No fevers, infectious symptoms. Previously saw infiltrates on chest imaging. She again is denying any infectious symptoms.  -- follow CBC, monitor for fevers   -- UA and 2 view chest XR ordered   -- If she develops fever or infectious symptoms will also obtain blood cultures and likely start Unasyn  -- Request ortho recheck her wound prior to discharge   --staff to assist patient to bathroom/commode AT LEAST  once per shift and perform bladder scan afterward to monitor PVR.      Hyperkalemia, resolved   Hyponatremia  ANDREA on CKD III  NAGMA, resolved  Cachexia   Creatinine trend since admission 1.07 > 1.10 > 1.17 > 1.20 > 1.16 >1.37>1.47>1.3>1.1>1.07   Na trend 133 down to as low as 121, up to 130, now at 127   K+ intermittently elevated, now 4.9  After an initial attempt to diurese patient with 20 mg of IV lasix (due to hypoxia) sodium decreased from 130  to 125.   Urine sodium < 20, Urine osm 500, serum osm isotonic. Suspecting hypovolemic hyponatremia with SIADH from pain  *last received lokelma evening 6/30, K has been stable since   *Started on 2% NS 6/29, stopped evening 6/30 with Na 130.  *Cr improved following straight cath, question if urinary retention playing a role in above   --Nephrology following, appreciate assistance. Increase fluid restriction to 1500cc 7/2. Monitor closely for retention.   --increased fluid restriction to 1500cc 7/2   --continue Ure Na bid   --holding PTA ACE, diuretic  --Daily BMP    Post-operative Hypoxia, resolved  Right Transudative Pleural effusion s/p US thoracentesis 6/28  Patient has been on continuous oximetry for >24 hours and we have not been able to wean off of oxygen. She continues to require 1 lpm. She consistently drops to the mid 80's on room air. Chest XR showed right lower lobe infiltrate, versus effusion, versus atelectasis. Significantly reduced breath sounds at right lung base. No wheezes, crackles, or rales. She is on DOAC but at lowered dose. Not tachycardic. Currently do not suspect PE. WBC now slightly elevated at 11.6 but patient without infectious symptoms. Chest CT showed large right pleural effusion. S/p US thoracentesis on 6/28. Findings consistent with transudate effusion, perhaps in setting of atelectasis after surgery. BNP is elevated however patient appears more volume down on exam so do not suspect acute HFrEF.  - Repeat chest XR ordered for 6/29, showed  improved pleural effusion after the thoracentesis  with improved lung aeration   --currently stable on RA   --encourage IS     Anxiety  Apparently anxious on admission. Calm and cooperative this morning. She reports prn medication for sleep (seroquel) has been helping a lot.   - Seroquel 6.25 mg BID prn and 12.5 mg at bedtime prn.  - Avoid anticholinergics     Hypertension  Intermittent atrial fibrillation  History of paroxysmal complete heart block s/p dual chamber permament pacemaker 5/10/21  H/o of Severe Mitral regurgitation  Denies any chest pain, palpitations or anginal equivalents on admission. She has a history of nonobstructive carotid artery disease, pulmonary hypertension, mild aortic valve stenosis, severe mitral valve regurgitation with a flail P2 leaflet. She follows with Dr. Kenney at [facility]. She is due for outpatient cardiology follow up and repeat TTE which is being checked every 6 months. EKG with ventricularly paced rhythm. Repeat TTE on 6/27 due to post-operative hypoxia showing new WMA and decrease in EF. However patient    - Now back on DOAC s/p surgery   - Cardiology consulted due to reduced EF and new WMA seen on TTE, recommending GDMT and outpatient cardiology follow up for further risk stratification  - Started on low dose COREG, recommend continuing these on discharge   - Holding PTA chlorthalidone and benazepril given normotension and electrolyte derangements, will hold on discharge     DM Type II  On metformin 500bid PTA  A1C 8.9  BG quite labile, as high as 300s though 123 overnight   --resume metformin 7/3  --start lantus 5u daily 7/1   -  continue Mod CHO diet, Increase carb coverage to 1u/10g carb tid with meals and snacks 7/2  - Sliding scale insulin, increase to high scale 7/1  --monitor for hypoglycemia      Hyperlipidemia  - Continue statin     Constipation  - Bowel regimen    Normocytic anemia  Iron deficiency   Hemoglobin on admission (pre-op) 10.1. has  generally been around 9 since that time. Down stable around 8.   - plan for IV iron while in hospital           Diet: Fluid restriction 1500 ML FLUID  Moderate Consistent Carb (60 g CHO per Meal) Diet  Snacks/Supplements Adult: Ensure Max Protein (bariatric); With Meals  Diet    DVT Prophylaxis: DOAC  Tiwari Catheter: Not present  Lines: None     Cardiac Monitoring: None  Code Status: Full Code      Clinically Significant Risk Factors         # Hyponatremia: Lowest Na = 127 mmol/L in last 2 days, will monitor as appropriate      # Hypoalbuminemia: Lowest albumin = 2.8 g/dL at 7/2/2024  7:23 AM, will monitor as appropriate        # Chronic heart failure with reduced ejection fraction: last echo with EF <40%              # Severe Malnutrition: based on nutrition assessment           Disposition Plan     Medically Ready for Discharge: 7/5 to [facility]        Louis Zuluaga DO  Hospitalist Service  [facility]  Securely message with [employer] (more info)  Text page via [employer] Paging/Directory   ______________________________________________________________________    Interval History     - No acute events overnight  - Patient denies any new or worsening symptoms today  - She notes persistent serous drainage from the left leg wound  - Denies fevers or chills  - Hopeful for discharge tomorrow  - No other acute concerns     Physical Exam   Temp: 97.7  F (36.5  C) Temp src: Oral BP: 125/64 Pulse: 77   Resp: 16 SpO2: 94 % O2 Device: None (Room air)    Vitals:    07/01/24 0549 07/02/24 0500 07/03/24 0500   Weight: 61.1 kg (134 lb 11.2 oz) 60.2 kg (132 lb 11.5 oz) 61 kg (134 lb 7.7 oz)     Vital Signs with Ranges  Temp:  [97.7  F (36.5  C)-98.1  F (36.7  C)] 97.7  F (36.5  C)  Pulse:  [69-77] 77  Resp:  [16] 16  BP: (125-130)/(64-65) 125/64  SpO2:  [94 %-95 %] 94 %  I/O last 3 completed shifts:  In: 118 [P.O.:118]  Out: 1225 [Urine:1225]    Constitutional: Alert, sitting up in chair. Appears comfortable and is  appropriately conversant   ENT:  moist mucous membranes  Respiratory: No increased work of breathing  Cardiovascular: Regular rate and rhythm, no significant LE edema   GI:  active bowel sounds, abdomen soft, non-tender  MSK:  Moves all four extremities, left leg surgical incision with new gauze bandage, no active drainage on exam  Neuro:  speech is clear. Face symmetric. Follows commands      Medical Decision Making       46 MINUTES SPENT BY ME on the date of service doing chart review, history, exam, documentation & further activities per the note.      Data     I have personally reviewed the following data over the past 24 hrs:    N/A  \   N/A   / N/A     N/A N/A N/A /  98   N/A N/A N/A \     ALT: N/A AST: N/A AP: N/A TBILI: N/A   ALB: N/A TOT PROTEIN: N/A LIPASE: N/A       Imaging results reviewed over the past 24 hrs:   No results found for this or any previous visit (from the past 24 hour(s)).

## 2024-07-05 VITALS
TEMPERATURE: 98.1 F | RESPIRATION RATE: 16 BRPM | DIASTOLIC BLOOD PRESSURE: 46 MMHG | HEIGHT: 63 IN | SYSTOLIC BLOOD PRESSURE: 101 MMHG | OXYGEN SATURATION: 95 % | WEIGHT: 134.48 LBS | BODY MASS INDEX: 23.83 KG/M2 | HEART RATE: 64 BPM

## 2024-07-05 LAB
ACANTHOCYTES BLD QL SMEAR: ABNORMAL
BASOPHILS # BLD MANUAL: 0 10E3/UL (ref 0–0.2)
BASOPHILS NFR BLD MANUAL: 0 %
EOSINOPHIL # BLD MANUAL: 0 10E3/UL (ref 0–0.7)
EOSINOPHIL NFR BLD MANUAL: 0 %
ERYTHROCYTE [DISTWIDTH] IN BLOOD BY AUTOMATED COUNT: 16.3 % (ref 10–15)
GIANT PLATELETS BLD QL SMEAR: SLIGHT
GLUCOSE BLDC GLUCOMTR-MCNC: 116 MG/DL (ref 70–99)
GLUCOSE BLDC GLUCOMTR-MCNC: 154 MG/DL (ref 70–99)
HCT VFR BLD AUTO: 27.1 % (ref 35–47)
HGB BLD-MCNC: 8.5 G/DL (ref 11.7–15.7)
LYMPHOCYTES # BLD MANUAL: 0.7 10E3/UL (ref 0.8–5.3)
LYMPHOCYTES NFR BLD MANUAL: 4 %
MCH RBC QN AUTO: 27.5 PG (ref 26.5–33)
MCHC RBC AUTO-ENTMCNC: 31.4 G/DL (ref 31.5–36.5)
MCV RBC AUTO: 88 FL (ref 78–100)
MONOCYTES # BLD MANUAL: 1.7 10E3/UL (ref 0–1.3)
MONOCYTES NFR BLD MANUAL: 9 %
NEUTROPHILS # BLD MANUAL: 16 10E3/UL (ref 1.6–8.3)
NEUTROPHILS NFR BLD MANUAL: 87 %
NRBC # BLD AUTO: 0 10E3/UL
NRBC BLD AUTO-RTO: 0 /100
PATH REV: ABNORMAL
PLAT MORPH BLD: ABNORMAL
PLATELET # BLD AUTO: 305 10E3/UL (ref 150–450)
POLYCHROMASIA BLD QL SMEAR: SLIGHT
RBC # BLD AUTO: 3.09 10E6/UL (ref 3.8–5.2)
RBC MORPH BLD: ABNORMAL
WBC # BLD AUTO: 18.4 10E3/UL (ref 4–11)

## 2024-07-05 PROCEDURE — 258N000003 HC RX IP 258 OP 636: Performed by: INTERNAL MEDICINE

## 2024-07-05 PROCEDURE — 250N000013 HC RX MED GY IP 250 OP 250 PS 637: Performed by: STUDENT IN AN ORGANIZED HEALTH CARE EDUCATION/TRAINING PROGRAM

## 2024-07-05 PROCEDURE — 85025 COMPLETE CBC W/AUTO DIFF WBC: CPT | Performed by: STUDENT IN AN ORGANIZED HEALTH CARE EDUCATION/TRAINING PROGRAM

## 2024-07-05 PROCEDURE — 85007 BL SMEAR W/DIFF WBC COUNT: CPT | Performed by: STUDENT IN AN ORGANIZED HEALTH CARE EDUCATION/TRAINING PROGRAM

## 2024-07-05 PROCEDURE — 250N000011 HC RX IP 250 OP 636: Performed by: INTERNAL MEDICINE

## 2024-07-05 PROCEDURE — 99239 HOSP IP/OBS DSCHRG MGMT >30: CPT | Performed by: STUDENT IN AN ORGANIZED HEALTH CARE EDUCATION/TRAINING PROGRAM

## 2024-07-05 PROCEDURE — 250N000013 HC RX MED GY IP 250 OP 250 PS 637: Performed by: INTERNAL MEDICINE

## 2024-07-05 PROCEDURE — 250N000013 HC RX MED GY IP 250 OP 250 PS 637: Performed by: PHYSICIAN ASSISTANT

## 2024-07-05 PROCEDURE — 36415 COLL VENOUS BLD VENIPUNCTURE: CPT | Performed by: STUDENT IN AN ORGANIZED HEALTH CARE EDUCATION/TRAINING PROGRAM

## 2024-07-05 PROCEDURE — 250N000013 HC RX MED GY IP 250 OP 250 PS 637

## 2024-07-05 RX ORDER — NITROFURANTOIN 25; 75 MG/1; MG/1
100 CAPSULE ORAL EVERY 12 HOURS SCHEDULED
Status: DISCONTINUED | OUTPATIENT
Start: 2024-07-05 | End: 2024-07-05 | Stop reason: HOSPADM

## 2024-07-05 RX ORDER — NICOTINE POLACRILEX 4 MG
15-30 LOZENGE BUCCAL
DISCHARGE
Start: 2024-07-05

## 2024-07-05 RX ORDER — CARVEDILOL 6.25 MG/1
TABLET ORAL
DISCHARGE
Start: 2024-07-05 | End: 2024-08-07

## 2024-07-05 RX ORDER — NICOTINE POLACRILEX 4 MG
15-30 LOZENGE BUCCAL
Status: DISCONTINUED | OUTPATIENT
Start: 2024-07-05 | End: 2024-07-05

## 2024-07-05 RX ORDER — DEXTROSE MONOHYDRATE 25 G/50ML
25-50 INJECTION, SOLUTION INTRAVENOUS
Status: DISCONTINUED | OUTPATIENT
Start: 2024-07-05 | End: 2024-07-05

## 2024-07-05 RX ORDER — NITROFURANTOIN 25; 75 MG/1; MG/1
100 CAPSULE ORAL EVERY 12 HOURS
DISCHARGE
Start: 2024-07-05 | End: 2024-07-10

## 2024-07-05 RX ADMIN — NITROFURANTOIN MONOHYDRATE/MACROCRYSTALLINE 100 MG: 25; 75 CAPSULE ORAL at 10:41

## 2024-07-05 RX ADMIN — IRON SUCROSE 300 MG: 20 INJECTION, SOLUTION INTRAVENOUS at 07:38

## 2024-07-05 RX ADMIN — APIXABAN 2.5 MG: 2.5 TABLET, FILM COATED ORAL at 07:38

## 2024-07-05 RX ADMIN — INSULIN GLARGINE 5 UNITS: 100 INJECTION, SOLUTION SUBCUTANEOUS at 07:40

## 2024-07-05 RX ADMIN — TAMSULOSIN HYDROCHLORIDE 0.4 MG: 0.4 CAPSULE ORAL at 07:38

## 2024-07-05 RX ADMIN — Medication 1 TABLET: at 07:37

## 2024-07-05 RX ADMIN — OXYCODONE HYDROCHLORIDE 10 MG: 5 TABLET ORAL at 11:55

## 2024-07-05 RX ADMIN — ACETAMINOPHEN 650 MG: 325 TABLET, FILM COATED ORAL at 04:34

## 2024-07-05 RX ADMIN — CARVEDILOL 6.25 MG: 3.12 TABLET, FILM COATED ORAL at 07:37

## 2024-07-05 RX ADMIN — METFORMIN HYDROCHLORIDE 500 MG: 500 TABLET ORAL at 07:37

## 2024-07-05 ASSESSMENT — ACTIVITIES OF DAILY LIVING (ADL)
ADLS_ACUITY_SCORE: 38
ADLS_ACUITY_SCORE: 39
ADLS_ACUITY_SCORE: 38

## 2024-07-05 NOTE — PROGRESS NOTES
Orthopedic Surgery  Lisa Meraz  07/05/2024     Admit Date:  6/23/2024  POD: 11 Days Post-Op   Procedure(s):  Open reduction internal fixation of left intertrochanteric fracture with short intramedullary nail    Patient resting in chair.   Pain controlled to the left hip. Notes itching of left buttock region  Tolerating oral intake. Denies nausea or vomiting.  Denies chest pain or shortness of breath.  No acute events overnight.    Temp:  [98  F (36.7  C)-98.1  F (36.7  C)] 98.1  F (36.7  C)  Pulse:  [63-68] 64  Resp:  [16] 16  BP: (100-120)/(46-56) 101/46  SpO2:  [93 %-95 %] 95 %    Alert and oriented.  Left hip gauze/tegaderm dressings saturated with serous drainage.  Removed and surgical sites are well-approximately. Scant active weeping.  Minimal erythema of surrounding skin.  Mild swelling to the left lateral hip and buttock.   Bilateral calves are soft, non-tender.  Left lower extremity is NVI.  Patient able to resist ankle dorsiflexion and plantar flexion bilaterally.  DP pulse palpable.  Sensation intact bilateral lower extremities.    Labs/Imaging:  Recent Labs   Lab Test 07/05/24  0746 07/04/24  0719 07/03/24  0710   WBC 18.4* 19.6* 12.1*   HGB 8.5* 8.8* 8.4*    313 299     Recent Labs   Lab Test 06/23/24  1046   INR 1.01     A/P    1. S/p left intertrochanteric femur fracture ORIF using CM device (DOS: 6/24/24)  - underlying seroma is cause of new serous weeping over the past few days.   -Continue PTA Eliquis 2.5 mg BID for DVT prophylaxis.    -Hgb stable.  -Mobilize with PT/OT.  -WBAT LLE with walker.    -Continue current pain regimen.  -Dressings: complete daily dressing changes with ABD pads and tape to the left hip surgical sites. Continue until follow up with ortho surgeon at 2 weeks postop.   -Follow-up: 2 weeks post-op with Dr. Richy Dunbar/Reese Lofton PA-C    2. Disposition  - OK to discharge to TCU, keep skin dry with frequent dressing changes as needed for saturation

## 2024-07-05 NOTE — CONSULTS
"[facility] Nurse Inpatient Assessment     Consulted for: sacral    Summary: Unstageable PI to coccyx, hospital acquired. Assessed by two [facility] nurses.     Patient History (according to provider note(s):      \"89-year-old woman with history of hypertension, heart failure, A-fib on Eliquis, and type 2 diabetes presents with left hip pain.\"    ORIF and nailing to left hip 6/24.    Assessment:      Areas visualized during today's visit: Sacrum/coccyx    Pressure Injury Location: Coccyx        Last photo: 7/5  Wound type: Pressure Injury, Friction, and Moisture Associated Skin Damage (MASD)     Pressure Injury Stage: Unstageable, hospital acquired     Wound history/plan of care:  Patient admitted to ortho unit and was in OR 6/24 for ORIF of left hip. Per chart review, there is no nursing documentation of coccyx wound until 7/4. Patient tells writer she prefers to sit up in the chair despite pain to coccyx and bottom. Reports that she has been up in chair \"all morning\". Gel surface in place on the chair during writer's assessment. Patient able to stand with max Ax1, walker, and gait belt. Sacral Mepilex in place.    Wound base: 75% yellow-gray slough over bony coccyx, pink-red moist tissue      Palpation of the wound bed: firm      Drainage: scant     Description of drainage: serosanguinous     Measurements (length x width x depth, in cm) 5.5  x 3  x  0.2 cm      Tunneling N/A     Undermining N/A  Periwound skin: Erythema- blanchable      Color: pink      Temperature: normal   Odor: none  Pain: Irritable or crying out at intervals, tension to hands, feet and body, facial expression of distress, and during dressing change, intermittent  Pain intervention prior to dressing change: slow and gentle cares  and distraction  Treatment goal: Infection control/prevention, Decrease moisture, and Protection  STATUS: initial assessment and evolving  Supplies ordered: gathered, supplies stored on unit, " "discussed with RN, and discussed with patient     My PI Risk Assessment     Sensory Perception: 3 - Slightly Limited     Moisture: 3 - Occasionally moist      Activity: 2 - Chairfast     Mobility: 3 - Slightly limited      Nutrition: 2 - Probably inadequate      Friction/Shear: 1 - Problem     TOTAL: 14      Treatment Plan:     Coccyx wound(s): Daily  and PRN for soiled/loose dressing  Assess skin under Mepilex every shift for signs of wound deterioration.  Cleanse with wound cleanser or saline and pat dry.  Swab alli-wound skin with Cavilon no-sting barrier and let dry.  Apply a sacral Mepilex over wound, ensuring dressing conforms to deep gluteal cleft and completely covers wound bed.  Follow PIP Plan. See [facility] nurse note or \"skin care precautions\" for detailed orders.     Pressure Injury Prevention (PIP) Plan:  -If patient is declining pressure injury prevention interventions: Explore reason why and address patient's concerns, Educate on pressure injury risk and prevention intervention(s), If patient is still declining, document \"informed refusal\" , and Ensure Care team is aware ( provider, charge nurse, etc)  -Mattress: Follow bed algorithm, reassess daily and order specialty mattress, if indicated.  -HOB: Maintain at or below 30 degrees, unless contraindicated  -Repositioning in bed: Every 1-2 hours , Left/right positioning; avoid supine, and Raise foot of bed prior to raising head of bed, to reduce patient sliding down (shear)  -Heels: Keep elevated off mattress  -Protective Dressing: None  -Positioning Equipment:  Pillows  -Chair positioning: Chair cushion (#[id number]) , Assist patient to reposition hourly, and Do NOT use a donut for sitting (this increases pressure to smaller area and creates a higher potential for injury)    -Moisture Management: Perineal cleansing /protection: Follow Incontinence Protocol, Avoid brief in bed, Clean and dry skin folds with bathing , and Moisturize dry skin  -Under Devices: " "Inspect skin under all medical devices during skin inspection , Ensure tubes are stabilized without tension, and Ensure patient is not lying on medical devices or equipment when repositioned     Orders: Written    RECOMMEND PRIMARY TEAM ORDER: None, at this time  Education provided: importance of repositioning, plan of care, wound progress, Moisture management, and Off-loading pressure  Discussed plan of care with: Patient and Nurse  WOC nurse follow-up plan: twice weekly  Notify WOC if wound(s) deteriorate.  Nursing to notify the Provider(s) and re-consult the WOC Nurse if new skin concern.    DATA:     Current support surface: Standard  Standard Isoflex gel  Containment of urine/stool: Continent of bladder, Continent of bowel, and Incontinent pad in bed  BMI: Body mass index is 23.82 kg/m .   Active diet order: Orders Placed This Encounter      Moderate Consistent Carb (60 g CHO per Meal) Diet      Diet     Output: I/O last 3 completed shifts:  In: 80 [P.O.:80]  Out: 300 [Urine:300]     Labs:   Recent Labs   Lab 07/05/24  0746 07/04/24  0719   ALBUMIN  --  2.9*   HGB 8.5* 8.8*   WBC 18.4* 19.6*     Pressure injury risk assessment:   Sensory Perception: 4-->no impairment  Moisture: 3-->occasionally moist  Activity: 3-->walks occasionally  Mobility: 3-->slightly limited  Nutrition: 3-->adequate  Friction and Shear: 3-->no apparent problem  Colin Score: 19    Leisa Burks RN, CWOCN  Please contact via [employer] at name or group \"[facility] nurse\"- M-F 8A-4P  Leave  @ *[phone] for non-urgent needs. Checked occasionally M-F.   "

## 2024-07-05 NOTE — DISCHARGE SUMMARY
[facility]  Hospitalist Discharge Summary      Date of Admission:  6/23/2024  Date of Discharge:  7/5/2024  Discharging Provider: Louis Zuluaga DO  Discharge Service: Hospitalist Service    Discharge Diagnoses      Mechanical Fall  CHT  Left IT fracture of proximal femur s/p ORIF left intertrochanteric fracture with short intramedullary nail 6/24/24  Leukocytosis  Urinary retention   Hematuria  UTI  Hyperkalemia, resolved   Hyponatremia  ANDREA on CKD III  NAGMA, resolved  Cachexia    Post-operative Hypoxia, resolved  Right Transudative Pleural effusion s/p US thoracentesis 6/28   Anxiety   Hypertension  Intermittent atrial fibrillation  History of paroxysmal complete heart block s/p dual chamber permament pacemaker 5/10/21  H/o of Severe Mitral regurgitation   DM Type II   Hyperlipidemia  Constipation   Normocytic anemia  Iron deficiency   Coccyx pressure injury: unstageable, hospital acquired     Clinically Significant Risk Factors     # Severe Malnutrition: based on nutrition assessment      Follow-ups Needed After Discharge   Follow-up Appointments     Follow Up and recommended labs and tests      Please call as soon as possible to make an appointment to be seen in Dr. Richy Dunbar's clinic at 2 weeks postop for a recheck of your   surgical site, possible repeat x-rays, and wound care. If you are at a TCU   at this time and they have x-ray capabilities, you may complete your wound   care and x-rays at your TCU and have them send your images to Dr. Dunbar's office.     Please call Dr. Dunbar's care coordinator, Wendy, at [phone] to   schedule an appointment.       Dr. Dunbar sees patients at 2 clinic locations:  [facility]  [address], [place] [address]  [facility]  [address] [address] [place] [address] N, [place] [address]  Please call the on-call phone number [phone] during evenings, nights   and weekends for  any urgent needs.    Follow up with Nephrology as an outpatient. They will contact you to   arrange this.   You will need to have a BMP  Monday 7/8/24 at TCU. RESULTS SHOULD BE FAXED   BY U TO [facility] (Nephrology) at fax: [phone]    Follow up with nursing home provider within 5 days. BMP recommended as   above, also CBC to follow up WBC. Review blood sugars and adjust insulin   as needed            Unresulted Labs Ordered in the Past 30 Days of this Admission       Date and Time Order Name Status Description    7/5/2024  9:55 AM Urine Culture In process           Discharge Disposition   Discharged to home  Condition at discharge: Stable    Hospital Course     Lisa Meraz is a 89 year old female who presents to the emergency room after mechanical fall. She was attempting to make her bed when she fell. Denies any loss of consciousness, but does state that she hit her head. Found to have left femur fracture. CTH and CT of cervical spine showed no trauma. S/p ORIF of left intertrochanteric fracture with short medullary nail 6/24/24.     Mechanical Fall  CHT  Left IT fracture of proximal femur s/p ORIF left intertrochanteric fracture with short intramedullary nail 6/24/24  - Using prn tylenol, scheduled robaxin, prn oxy   - Bowel regimen   - Orthopedics coordinating follow up  - On apixiban 2.5 mg bid   - Increase in serous drainage noted 7/2, underlying seroma suspected by Ortho. Recommend daily dressing changes with ABD pads to surgical site. Do not suspect infection of surgical wound at this time  - PT/OT at TCU     Leukocytosis  Urinary retention  Hematuria  UTI  Required straight cath for bladder scan 650 on evening 7/1. She did not attempt to void before being straight cathed.  Patient reports she does not feel when she has to void so often goes long periods between using the bathroom. WBC 10.6 (6/30)>12.6>10.4>.12.1 > 19>18. No fevers, infectious symptoms. Previously saw infiltrates on  chest imaging. She again is denying any infectious symptoms. UA consistent with infection. Culture pending.  -- macrobid 100 mg bid started on day of discharge  -- urine culture is pending, IM team will follow up  -- Repeat UA after completing antibiotics to ensure resolution of hematuria  -- Trend WBC at [facility] while completing course of antibiotics   --staff to assist patient to bathroom/commode AT LEAST once per shift and perform bladder scan afterward to monitor PVR.       Hyperkalemia, resolved   Hyponatremia  ANDREA on CKD III  NAGMA, resolved  Cachexia   Creatinine trend since admission 1.07 > 1.10 > 1.17 > 1.20 > 1.16 >1.37>1.47>1.3>1.1>1.07   Na trend 133 down to as low as 121, up to 130, now at 127   K+ intermittently elevated, now 4.9  After an initial attempt to diurese patient with 20 mg of IV lasix (due to hypoxia) sodium decreased from 130  to 125.   Urine sodium < 20, Urine osm 500, serum osm isotonic. Suspecting hypovolemic hyponatremia with SIADH from pain  *last received lokelma evening 6/30, K has been stable since   *Started on 2% NS 6/29, stopped evening 6/30 with Na 130.  *Cr improved following straight cath, question if urinary retention playing a role in above   --Nephrology following, appreciate assistance. Increase fluid restriction to 1500cc 7/2. Monitor closely for retention.   --increased fluid restriction to 1500cc 7/2   --continue Ure Na bid   -- stopped pta ACE and Hydrochlorothiazide  -- Repeat BMP on 7/6 and 7/8 and send results to intermed consultants Dr. Mcqueen    Post-operative Hypoxia, resolved  Right Transudative Pleural effusion s/p US thoracentesis 6/28  Patient has been on continuous oximetry for >24 hours and we have not been able to wean off of oxygen. She continues to require 1 lpm. She consistently drops to the mid 80's on room air. Chest XR showed right lower lobe infiltrate, versus effusion, versus atelectasis. Significantly reduced breath sounds at right lung base. No  wheezes, crackles, or rales. She is on DOAC but at lowered dose. Not tachycardic. Currently do not suspect PE. WBC now slightly elevated at 11.6 but patient without infectious symptoms. Chest CT showed large right pleural effusion. S/p US thoracentesis on 6/28. Findings consistent with transudate effusion, perhaps in setting of atelectasis after surgery. BNP is elevated however patient appears more volume down on exam so do not suspect acute HFrEF.  - Repeat chest XR ordered for 6/29, showed improved pleural effusion after the thoracentesis  with improved lung aeration   --currently stable on RA   --encourage IS      Anxiety  Apparently anxious on admission. Calm and cooperative this morning. She reports prn medication for sleep (seroquel) has been helping a lot.   - Seroquel 12.5 mg at bedtime prn.  - Avoid anticholinergics     Hypertension  Intermittent atrial fibrillation  History of paroxysmal complete heart block s/p dual chamber permament pacemaker 5/10/21  H/o of Severe Mitral regurgitation  Denies any chest pain, palpitations or anginal equivalents on admission. She has a history of nonobstructive carotid artery disease, pulmonary hypertension, mild aortic valve stenosis, severe mitral valve regurgitation with a flail P2 leaflet. She follows with Dr. Kenney at [facility]. She is due for outpatient cardiology follow up and repeat TTE which is being checked every 6 months. EKG with ventricularly paced rhythm. Repeat TTE on 6/27 due to post-operative hypoxia showing new WMA and decrease in EF. However patient    - Now back on DOAC s/p surgery   - Cardiology consulted due to reduced EF and new WMA seen on TTE, recommending GDMT and outpatient cardiology follow up for further risk stratification  - Started on low dose COREG, recommend continuing these on discharge   - Holding PTA chlorthalidone and benazepril given normotension and electrolyte derangements, will hold on discharge      DM Type  II  On metformin 500bid PTA  A1C 8.9  BG quite labile, as high as 300s though 123 overnight   --resume metformin 7/3  --start lantus 5u daily 7/1  - Sliding scale insulin, increase to high scale 7/1 and continued on discharge   --monitor for hypoglycemia      Hyperlipidemia  - Continue statin     Constipation  - Bowel regimen     Normocytic anemia  Iron deficiency   Hemoglobin on admission (pre-op) 10.1. has generally been around 9 since that time. Down stable around 8.    - plan for IV iron while in hospital     Sacral Ulcer   Coccyx pressure injury: unstageable, hospital acquired   Noted during hospitalization, unclear chronicity, possibly hospital acquired.  - Continue wound care on discharge       Diet: Fluid restriction 1500 ML FLUID  Moderate Consistent Carb (60 g CHO per Meal) Diet  Snacks/Supplements Adult: Ensure Max Protein (bariatric); With Meals  Diet    DVT Prophylaxis: DOAC  Tiwari Catheter: Not present  Lines: None     Cardiac Monitoring: None    Consultations This Hospital Stay   ORTHOPEDIC SURGERY IP CONSULT  PHYSICAL THERAPY ADULT IP CONSULT  OCCUPATIONAL THERAPY ADULT IP CONSULT  PHYSICAL THERAPY ADULT IP CONSULT  OCCUPATIONAL THERAPY ADULT IP CONSULT  PHYSICAL THERAPY ADULT IP CONSULT  OCCUPATIONAL THERAPY ADULT IP CONSULT  CARE MANAGEMENT / SOCIAL WORK IP CONSULT  NUTRITION SERVICES ADULT IP CONSULT  VASCULAR ACCESS ADULT IP CONSULT  CARDIOLOGY IP CONSULT  NEPHROLOGY IP CONSULT  VASCULAR ACCESS ADULT IP CONSULT  WOUND OSTOMY CONTINENCE NURSE  IP CONSULT  PHARMACY IP CONSULT    Code Status   Prior    Time Spent on this Encounter   I, Louis Zuluaga DO, personally saw the patient today and spent greater than 30 minutes discharging this patient.       Louis Zuluaga DO  [facility]  [address] [place] [id number]  Phone: [phone]  Fax: [phone]  ______________________________________________________________________    Physical Exam   Vital Signs:  Temp: 98.1  F (36.7  C) Temp src: Oral BP: 101/46 Pulse: 64   Resp: 16 SpO2: 95 % O2 Device: None (Room air)    Weight: 134 lbs 7.69 oz    Constitutional: Alert, sitting up in chair. Appears comfortable and is appropriately conversant   ENT:  moist mucous membranes  Respiratory: No increased work of breathing  Cardiovascular: Regular rate and rhythm, no significant LE edema   GI:  active bowel sounds, abdomen soft, non-tender  MSK:  Moves all four extremities, left leg surgical incision with new gauze bandage, no active drainage on exam  Neuro:  speech is clear. Face symmetric. Follows commands Right eye lid droop reported as chronic     Primary Care Physician   Physician No Ref-Primary    Discharge Orders      General info for SNF    Length of Stay Estimate: Short Term Care: Estimated # of Days <30  Condition at Discharge: Stable  Level of care:skilled   Rehabilitation Potential: Fair  Admission H&P remains valid and up-to-date: Yes  Recent Chemotherapy: N/A  Use Nursing Home Standing Orders: Yes     Mantoux instructions    Give two-step Mantoux (PPD) Per Facility Policy Yes     Reason for your hospital stay    S/p left intertrochanteric femur fracture ORIF using CM device (DOS: 6/24/24) performed by Dr. Richy Dunbar     Additional Discharge Instructions    Pain after surgery is normal and expected.  You will have some amount of pain and swelling for several weeks after surgery.  These symptoms will improve with time.  There are several things you can do to help reduce your pain including: rest, cold compresses, elevation, and using pain medications as needed. Contact your Surgeon Team if you have pain that persists or worsens after surgery despite rest, ice, elevation, and taking your medication(s) as prescribed. Contact your Surgeon Team if you have new numbness, tingling, or weakness in your operative extremity.    Swelling and/or bruising of the surgical extremity is common and may persist for several  months after surgery. In addition to frequent icing and elevation, gentle compressive support with an ACE wrap or tubigrip may help with swelling. Apply compression regularly, removing at least twice daily to perform skin checks. Contact your Surgeon Team if your swelling increases and is NOT associated with an increase in your activity level, or if your swelling increases and is associated with redness and pain.    Ice can be used to control swelling and discomfort after surgery. Place a thin towel over your operative site and apply the ice pack overtop. Leave ice pack in place for 20 minutes, then remove for 20 minutes. Repeat this 20 minutes on/20 minutes off routine as often as tolerated.    Please contact your surgical team with any concerns for infection including increasing redness around your surgical site, pus-like drainage, fever, chills, or flu-like symptoms.     Activity - Up with assistive device     Activity - Up with nursing assistance     Weight bearing status    WBAT LLE with walker     Intake and output    Every shift     Bladder scan    Bladder scan after patient voids to document pos void residuals for 2 days then follow up provider. If PVR >300 notify provider     Additional Discharge Instructions    Staff to take patient to bathroom to attempt to void AT LEAST every 6 hours. She does not always feel when she has to go and needs regular toileting to prevent retention     Follow Up and recommended labs and tests    Please call as soon as possible to make an appointment to be seen in Dr. Richy Dunbar's clinic at 2 weeks postop for a recheck of your surgical site, possible repeat x-rays, and wound care. If you are at a TCU at this time and they have x-ray capabilities, you may complete your wound care and x-rays at your TCU and have them send your images to Dr. Dunbar's office.     Please call Dr. Dunbar's care coordinator, Wendy, at [phone] to schedule an appointment.         Bettina sees patients at 2 clinic locations:  [facility]  [address], [place] [address]  [facility]  [address], [place] [address]  Please call the on-call phone number [phone] during evenings, nights and weekends for any urgent needs.    Follow up with Nephrology as an outpatient. They will contact you to arrange this.   You will need to have a BMP  Monday 7/8/24 at TCU. RESULTS SHOULD BE FAXED BY TCU TO [facility] (Nephrology) at fax: [phone]    Follow up with nursing home provider within 5 days. BMP recommended as above, also CBC to follow up WBC. Review blood sugars and adjust insulin as needed     Additional Discharge Instructions    - Patient needs a repeat BMP and CBC on 7/6 and 7/8. Please fax results of her BMP to Nephrologist Dr. Mcqueen at [phone] ([facility])  - She is discharging with five days of antibiotics for UTI, she will need a repeat urinalysis after completing treatment for UTI to ensure hematuria has resolved. Consider prolonged antibiotics if infection persists. A urine culture is in process. If change in antibiotic treatment is warranted, the hospitalist IM team may be in contact with TCU to adjust this.  - Will need blood sugar checks Three times per day before meals and before bedtime     Wound care    Site: Left hip  Instructions: Complete daily and PRN dressing changes with ABD pads and tape to the left hip surgical sites. Continue until follow up with ortho surgeon at 2 weeks postop. Please contact ortho team with any concerns. Please contact your orthopedic surgical team if persistent drainage or redness develops around the surgical site.    Coccyx, sacral wound   Daily  and PRN for soiled/loose dressing  1. Assess skin under Mepilex every shift for signs of wound deterioration.  2. Cleanse with wound cleanser or saline and pat dry.  3. Swab alli-wound skin with Cavilon no-sting  barrier and let dry.  4. Apply a sacral Mepilex over wound, ensuring dressing conforms to deep gluteal cleft and completely covers wound bed.     Physical Therapy Adult Consult    Evaluate and treat as clinically indicated.    Reason: S/p left intertrochanteric femur fracture ORIF using CM device (DOS: 6/24/24) performed by Dr. Richy Dunbar     Occupational Therapy Adult Consult    Evaluate and treat as clinically indicated.    Reason: S/p left intertrochanteric femur fracture ORIF using CM device (DOS: 6/24/24) performed by Dr. Richy Dunbar     Fall precautions     Crutches DME    DME Documentation: Describe the reason for need to support medical necessity: Impaired gait status post hip surgery. I, the undersigned, certify that the above prescribed supplies are medically necessary for this patient and is both reasonable and necessary in reference to accepted standards of medical practice in the treatment of this patient's condition and is not prescribed as a convenience.     Cane DME    DME Documentation: Describe the reason for need to support medical necessity: Impaired gait status post hip surgery. I, the undersigned, certify that the above prescribed supplies are medically necessary for this patient and is both reasonable and necessary in reference to accepted standards of medical practice in the treatment of this patient's condition and is not prescribed as a convenience.     Walker DME    DME Documentation: Describe the reason for need to support medical necessity: Impaired gait status post hip surgery. I, the undersigned, certify that the above prescribed supplies are medically necessary for this patient and is both reasonable and necessary in reference to accepted standards of medical practice in the treatment of this patient's condition and is not prescribed as a convenience.     Diet    Follow this diet upon discharge: Orders Placed This Encounter      Fluid restriction 1500 ML FLUID       Snacks/Supplements Adult: Ensure Max Protein (bariatric); With Meals      Moderate Consistent Carb (60 g CHO per Meal) Diet       Significant Results and Procedures   Most Recent 3 CBC's:  Recent Labs   Lab Test 07/05/24  0746 07/04/24  0719 07/03/24  0710   WBC 18.4* 19.6* 12.1*   HGB 8.5* 8.8* 8.4*   MCV 88 87 86    313 299     Most Recent 3 BMP's:  Recent Labs   Lab Test 07/05/24  0727 07/05/24  0226 07/04/24  2101 07/04/24  0727 07/04/24  0719 07/03/24  0718 07/03/24  0710 07/02/24  0729 07/02/24 0723   NA  --   --   --   --  127*  --  127*  --  129*   POTASSIUM  --   --   --   --  4.9  --  5.0  --  5.1   CHLORIDE  --   --   --   --  92*  --  93*  --  93*   CO2  --   --   --   --  29  --  26  --  29   BUN  --   --   --   --  72.2*  --  62.1*  --  59.5*   CR  --   --   --   --  1.07*  --  1.14*  --  1.31*   ANIONGAP  --   --   --   --  6*  --  8  --  7   JHONATHAN  --   --   --   --  8.8  --  8.5  --  8.3   * 116* 115*   < > 113*   < > 167*   < > 133*    < > = values in this interval not displayed.     Most Recent 2 LFT's:  Recent Labs   Lab Test 06/23/24  1046   AST 27   ALT 27   ALKPHOS 50   BILITOTAL 0.4   ,   Results for orders placed or performed during the hospital encounter of 06/23/24   CT Head w/o Contrast    Narrative    EXAM: CT HEAD W/O CONTRAST, CT CERVICAL SPINE W/O CONTRAST  LOCATION: [facility]  DATE: 6/23/2024    INDICATION: Fall with head injury.  COMPARISON: None.  TECHNIQUE:   1) Routine CT Head without IV contrast. Multiplanar reformats. Dose reduction techniques were used.  2) Routine CT Cervical Spine without IV contrast. Multiplanar reformats. Dose reduction techniques were used.    FINDINGS:   HEAD CT:   INTRACRANIAL CONTENTS: No intracranial hemorrhage, extraaxial collection, or mass effect.  No CT evidence of acute infarct. Mild presumed chronic small vessel ischemic changes. Mild generalized volume loss. No hydrocephalus.     VISUALIZED  ORBITS/SINUSES/MASTOIDS: Prior left cataract surgery. Visualized portions of the orbits are otherwise unremarkable. No paranasal sinus mucosal disease. No middle ear or mastoid effusion.    BONES/SOFT TISSUES: No scalp hematoma. No skull fracture.    CERVICAL SPINE CT:   VERTEBRA: Unremarkable cervical alignment and preserved vertebral body heights. No fracture or posttraumatic subluxation.     CANAL/FORAMINA: Mild diffuse cervical spondylosis, including mild loss of disc height and minor endplate spurring at C5-6 and C6-7. No CT evidence for high-grade central spinal canal stenosis. Mild neural foraminal stenosis at C5-6.    PARASPINAL: Prevertebral soft tissues within normal limits for thickness. Atherosclerotic calcifications at the carotid bifurcations. Diffusely heterogeneous, nodular thyroid gland. Interlobular septal thickening of the included lung apices, potentially   related to pulmonary edema.      Impression    IMPRESSION:  HEAD CT:  1.  No CT evidence for acute intracranial process.  2.  Brain atrophy and presumed chronic microvascular ischemic changes as above.    CERVICAL SPINE CT:  1.  No CT evidence for acute fracture or post traumatic subluxation.  2.  Mild cervical spondylosis greatest at C5-6.  3.  Interlobular septal thickening at the lung apices. Correlate with any clinical evidence for pulmonary edema.   CT Cervical Spine w/o Contrast    Narrative    EXAM: CT HEAD W/O CONTRAST, CT CERVICAL SPINE W/O CONTRAST  LOCATION: [facility]  DATE: 6/23/2024    INDICATION: Fall with head injury.  COMPARISON: None.  TECHNIQUE:   1) Routine CT Head without IV contrast. Multiplanar reformats. Dose reduction techniques were used.  2) Routine CT Cervical Spine without IV contrast. Multiplanar reformats. Dose reduction techniques were used.    FINDINGS:   HEAD CT:   INTRACRANIAL CONTENTS: No intracranial hemorrhage, extraaxial collection, or mass effect.  No CT evidence of acute  infarct. Mild presumed chronic small vessel ischemic changes. Mild generalized volume loss. No hydrocephalus.     VISUALIZED ORBITS/SINUSES/MASTOIDS: Prior left cataract surgery. Visualized portions of the orbits are otherwise unremarkable. No paranasal sinus mucosal disease. No middle ear or mastoid effusion.    BONES/SOFT TISSUES: No scalp hematoma. No skull fracture.    CERVICAL SPINE CT:   VERTEBRA: Unremarkable cervical alignment and preserved vertebral body heights. No fracture or posttraumatic subluxation.     CANAL/FORAMINA: Mild diffuse cervical spondylosis, including mild loss of disc height and minor endplate spurring at C5-6 and C6-7. No CT evidence for high-grade central spinal canal stenosis. Mild neural foraminal stenosis at C5-6.    PARASPINAL: Prevertebral soft tissues within normal limits for thickness. Atherosclerotic calcifications at the carotid bifurcations. Diffusely heterogeneous, nodular thyroid gland. Interlobular septal thickening of the included lung apices, potentially   related to pulmonary edema.      Impression    IMPRESSION:  HEAD CT:  1.  No CT evidence for acute intracranial process.  2.  Brain atrophy and presumed chronic microvascular ischemic changes as above.    CERVICAL SPINE CT:  1.  No CT evidence for acute fracture or post traumatic subluxation.  2.  Mild cervical spondylosis greatest at C5-6.  3.  Interlobular septal thickening at the lung apices. Correlate with any clinical evidence for pulmonary edema.   XR Pelvis w Hip Left 1 View    Narrative    EXAM: XR PELVIS AND HIP LEFT 1 VIEW  LOCATION: [facility]  DATE: 6/23/2024    INDICATION: Left hip pain after traumatic injury.  COMPARISON: None.      Impression    IMPRESSION: Acute intertrochanteric fracture of the proximal left femur. Normal left hip joint alignment. Advanced degenerative arthritis in the left hip. No additional pelvic fracture. Right total hip arthroplasty, with normal alignment,  without   loosening or other complication. Advanced degenerative changes in the lower lumbar spine and SI joints. Severe atherosclerotic calcification.   XR Chest 1 View    Narrative    EXAM: XR CHEST 1 VIEW  LOCATION: [facility]  DATE: 6/23/2024    INDICATION: Shortness of breath  COMPARISON: None.      Impression    IMPRESSION: Dual-lead left-sided cardiac conduction device. Trace right pleural effusion with adjacent opacities which likely reflect atelectasis. Right apical scarring. Cardiomegaly with central vascular congestion and mild interstitial edema. Mitral   annulus calcifications.   XR Surgery SUGEY L/T 5 Min Fluoro w Stills    Narrative    EXAM: XR SURGERY SUGEY FLUORO LESS THAN 5 MIN W STILLS  LOCATION: [facility]  DATE: 6/24/2024    INDICATION: ORIF of left Intertrochanteric FX with short Intramedullary nail   fluoro 0.9 sec  COMPARISON: None.    TECHNIQUE: Intraoperative fluoroscopy performed during the patient's procedure.    RADIATION DOSE: Total Air Kerma 7.35 mGy    FINDINGS: There are 4 intraoperative images which show fixation of a femoral fracture in progress. Please see dedicated operative report for further details.     Fluoroscopy dose is 7.35 mGy.    XR Chest Port 1 View    Narrative    CHEST ONE VIEW  6/25/2024 2:09 PM     HISTORY: hypoxia    COMPARISON: Chest radiograph 6/23/2024.      Impression    IMPRESSION: Increased right mid and lower lung hazy opacity likely  reflecting layering small-to-moderate pleural effusion, atelectasis  and/or infection. Possible trace left pleural effusion. No  pneumothorax. Similar cardiomediastinal silhouette and dense mitral  valve annular calcification. Left chest wall cardiac device.    TANVIR HURLEY MD         SYSTEM ID:  [id number]   CT Chest w Contrast    Narrative    EXAM: CT CHEST W CONTRAST  LOCATION: [facility]  DATE: 6/27/2024    INDICATION: persistent hypoxia,  possible right pleural effusion  COMPARISON: None.  TECHNIQUE: CT chest with IV contrast. Multiplanar reformats were obtained. Dose reduction techniques were used.    CONTRAST: 53 mL Isovue 370    FINDINGS:   LUNGS AND PLEURA: Large right pleural effusion with complete atelectasis of right lower lobe and moderate atelectasis of right middle lobe.  Tiny left pleural effusion with minimal atelectasis at left base.    MEDIASTINUM/AXILLAE: Mild cardiomegaly. Pacemaker present. No lymphadenopathy. Heavy calcifications of mitral annulus.    CORONARY ARTERY CALCIFICATION: Moderate.    UPPER ABDOMEN: Unremarkable.    MUSCULOSKELETAL: Unremarkable.      Impression    IMPRESSION:   1.  Large right pleural effusion with associated presence of atelectasis.  2.  Very small volume left pleural effusion.   US Thoracentesis    Narrative    EXAM:  1. RIGHT THORACENTESIS  2. ULTRASOUND GUIDANCE  LOCATION: [facility]  DATE/TIME: 6/28/2024 11:11 AM    INDICATION: Pleural effusion.    PROCEDURE: Informed consent obtained. Time out performed. The chest  was prepped and draped in a sterile fashion. 10 mL of 1% lidocaine was  infused into local soft tissues. A 5 Turkmen catheter system was  introduced into the pleural effusion under ultrasound guidance.    0.8 liters of clear fluid were removed and sent to lab if requested.    Patient tolerated procedure well.    Ultrasound images have been permanently captured for documentation.      Impression    IMPRESSION:  Status post ultrasound-guided right thoracentesis.    SHEREEN WALDROP MD         SYSTEM ID:  [id number]   XR Chest 2 Views    Narrative    EXAM: XR CHEST 2 VIEWS  LOCATION: [facility]  DATE: 06/29/2024    INDICATION: Status post ultrasound thoracentesis on 06/27 for large right pleural effusion.  COMPARISON: 06/27/2024.      Impression    IMPRESSION: Significant decrease in size of now small right pleural effusion status post interval thoracentesis.  Improved aeration right lung base although there is persistent atelectasis or consolidation medially. Stable small left pleural effusion. No   pneumothorax.     XR Chest 2 Views    Narrative    EXAM: XR CHEST 2 VIEWS  LOCATION: [facility]  DATE: 2024    INDICATION: worsening leukocytosis  COMPARISON: 2024.      Impression    IMPRESSION: Graded opacities are seen in the lower lobes with blunting of the costophrenic angles, most notable in the lateral projection, consistent with small volume pleural effusions and probable adjacent atelectasis. Minimal interstitial thickening,   suggestive of edema and/or scarring. No findings specific for pneumonia or pneumothorax. Mild central vascular prominence. Heart size is upper limits of normal. Coarse calcification of mitral annulus. Left pectoral implanted cardiac device and dual   cardiac leads in expected position. Hypertrophic degenerative changes of the spine. No acute osseous abnormality.   Echocardiogram Complete     Value    LVEF  35-40%    Narrative    [id number]  [id number]  [id number]  [id number]^ROSEMARIE^ISA^AILEEN     [facility]  Echocardiography Laboratory  [address] [place] [address]     Name: MOODY KAT  MRN: [id number]  : 1934  Study Date: 2024 11:39 AM  Age: 89 yrs  Gender: Female  Patient Location: [facility]  Reason For Study: Mitral Regurgitation  Ordering Physician: ISA HOUSTON  Performed By: Cynthia Sanchez     BSA: 2.1 m2  Height: 65 in  Weight: 234 lb  HR: 62  BP: 127/65 mmHg  ______________________________________________________________________________  Procedure  Complete Portable Echo Adult.  ______________________________________________________________________________  Interpretation Summary     Technically challenging study due to patient body habitus.     The left ventricular cavity is small. Calculated LV wall thickness consistent  with concentric remodeling  however visually appears to be concentric left  ventricular hypertrophy.  Left ventricular systolic function is moderately reduced. The visual ejection  fraction is 35-40%.  Hypokinesis of the mid to apical anterior, anteroseptal, inferoseptal, apex  wall segments.  The right ventricle is normal in size and function.  Mild aortic stenosis, Vmax 2.2 m/s, mean gradient 8 mmHg, CHERRIE 1.3cm2, DI 0.61,  SVi 25 cc/m2.  Mitral regurgitation is at least moderate, possibly severe, the regurgitant  jet is eccentric directed against the interatrial septum and so likely  underestimated.  Moderate (2+) tricuspid regurgitation.  Small posterior pericardial effusion.  There are no echocardiographic indications of cardiac tamponade.     There are no prior studies available for comparison.  ______________________________________________________________________________  Left Ventricle  The left ventricular cavity is small. Calculated LV wall thickness consistent  with concentric remodeling however visually appears to be concentric left  ventricular hypertrophy. Left ventricular systolic function is moderately  reduced. The visual ejection fraction is 35-40%. Grade III or advanced  diastolic dysfunction. Hypokinesis of the mid to apical anterior,  anteroseptal, inferoseptal, apex wall segments.     Right Ventricle  The right ventricle is normal in size and function.     Atria  The left atrium is moderate to severely dilated. There is a catheter/pacemaker  lead seen in the right atrium.     Mitral Valve  Mitral regurgitation is at least moderate, possibly severe, the regurgitant  jet is eccentric directed against the interatrial septum and so likely  underestimated.     Tricuspid Valve  There is moderate (2+) tricuspid regurgitation. The right ventricular systolic  pressure is approximated at 29.3 mmHg plus the right atrial pressure.     Aortic Valve  Mild aortic stenosis, Vmax 2.2 m/s, mean gradient 8 mmHg, CHERRIE 1.3cm2, DI 0.61,  SVi 25  cc/m2.     Pulmonic Valve  The pulmonic valve is not well seen, but is grossly normal.     Vessels  The aortic root is normal size. Normal size ascending aorta.     Pericardium  Small posterior pericardial effusion. There are no echocardiographic  indications of cardiac tamponade.     ______________________________________________________________________________  MMode/2D Measurements & Calculations  IVSd: 1.3 cm  LVIDd: 3.6 cm  LVIDs: 2.5 cm  LVPWd: 1.5 cm  FS: 30.0 %     LV mass(C)d: 172.5 grams  LV mass(C)dI: 81.6 grams/m2  Ao root diam: 2.9 cm  LA dimension: 4.3 cm  asc Aorta Diam: 3.3 cm  LA/Ao: 1.5  LVOT diam: 1.6 cm  LVOT area: 2.1 cm2  Ao root diam index Ht(cm/m): 1.7  Ao root diam index BSA (cm/m2): 1.4  Asc Ao diam index BSA (cm/m2): 1.6  Asc Ao diam index Ht(cm/m): 2.0  LA Volume (BP): 102.0 ml  LA Volume Index (BP): 48.3 ml/m2     RV Base: 3.3 cm  RWT: 0.80  TAPSE: 1.8 cm     Doppler Measurements & Calculations  MV E max carlitos: 136.0 cm/sec  MV A max carlitos: 44.3 cm/sec  MV E/A: 3.1  MV max P.9 mmHg  MV mean PG: 3.2 mmHg  MV V2 VTI: 33.9 cm  MVA(VTI): 1.5 cm2  MV dec slope: 595.1 cm/sec2  MV dec time: 0.23 sec  Ao V2 max: 224.9 cm/sec  Ao max P.0 mmHg  Ao V2 mean: 121.9 cm/sec  Ao mean P.2 mmHg  Ao V2 VTI: 43.8 cm  CHERRIE(I,D): 1.2 cm2  CHERRIE(V,D): 1.3 cm2  LV V1 max P.6 mmHg  LV V1 max: 137.8 cm/sec  LV V1 VTI: 25.5 cm  MR PISA: 5.2 cm2  MR ERO: 0.45 cm2  MR volume: 68.3 ml  SV(LVOT): 52.4 ml  SI(LVOT): 24.8 ml/m2  PA acc time: 0.07 sec  TR max carlitos: 270.0 cm/sec  TR max P.3 mmHg  AV Carlitos Ratio (DI): 0.61  CHERRIE Index (cm2/m2): 0.57     RV S Carlitos: 11.0 cm/sec     ______________________________________________________________________________  Report approved by: Jeffry Walsh 2024 02:11 PM             Discharge Medications   Discharge Medication List as of 2024 12:20 PM        START taking these medications    Details   glucose 40 % (400 mg/mL) gel Take 15-30 g by mouth every 15 minutes  as needed for low blood sugarTransitional      insulin glargine (LANTUS PEN) 100 UNIT/ML pen Inject 5 Units Subcutaneous daily, TransitionalIf Lantus is not covered by insurance, may substitute Basaglar or Semglee or other insulin glargine product per insurance preference at same dose and frequency.        metFORMIN (GLUCOPHAGE) 500 MG tablet Take 1 tablet (500 mg) by mouth 2 times daily (with meals), Transitional      multivitamin w/minerals (THERA-VIT-M) tablet Take 1 tablet by mouth daily, Transitional      nitroFURantoin macrocrystal-monohydrate (MACROBID) 100 MG capsule Take 1 capsule (100 mg) by mouth every 12 hours for 9 doses, Transitional      oxyCODONE (ROXICODONE) 5 MG tablet Take 1 tablet (5 mg) by mouth every 4 hours as needed for severe pain, Disp-20 tablet, R-0, Local Print      QUEtiapine (SEROQUEL) 25 MG tablet Take 0.5 tablets (12.5 mg) by mouth nightly as needed, Transitional      senna-docusate (SENOKOT-S/PERICOLACE) 8.6-50 MG tablet Take 1 tablet by mouth 2 times daily as needed for constipation, Transitional      tamsulosin (FLOMAX) 0.4 MG capsule Take 1 capsule (0.4 mg) by mouth daily, Transitional      Urea (URE-NA) 15 g PACK packet Take 15 g by mouth 2 times daily, Transitional           CONTINUE these medications which have CHANGED    Details   acetaminophen (TYLENOL) 325 MG tablet Take 3 tablets (975 mg) by mouth every 8 hours as needed for mild pain, Transitional      carvedilol (COREG) 6.25 MG tablet Give 1 tablet two times daily with meals. Hold for SBP < 100 or HR < 60, Transitional      !! insulin aspart (NOVOLOG PEN) 100 UNIT/ML pen Administer sliding scale insulin three times daily before meals. Do Not give Correction Insulin if Pre-Meal BG less than 140. For Pre-Meal  - 164 give 1 unit. For Pre-Meal  - 189 give 2 units. For Pre-Meal  - 214 give 3 units. For Pre-M eal  - 239 give 4 units. For Pre-Meal  - 264 give 5 units. For Pre-Meal  - 289 give  6 units. For Pre-Meal  - 314 give 7 units. For Pre-Meal  - 339 give 8 units. For Pre-Meal  - 364 give 9 units.  For Pre-Meal BG greate r than or equal to 365 give 10 units. If BG > 400 please give 10 units and contact TCU provider, Transitional      !! insulin aspart (NOVOLOG PEN) 100 UNIT/ML pen HIGH INSULIN RESISTANCE DOSING    Do Not give Bedtime Correction Insulin if BG less than 200.   For  - 224 give 1 units.   For  - 249 give 2 units.   For  - 274 give 3 units.   For  - 299 give 4 units.   For  - 324 give 5 un its.   For  - 349 give 6 units.   For BG greater than or equal to 350 give 7 units.   Notify provider if glucose greater than or equal to 350 mg/dL after administration of correction dose., Transitional      methocarbamol (ROBAXIN) 500 MG tablet Take 0.5 tablets (250 mg) by mouth 3 times daily as needed for muscle spasms, Disp-20 tablet, R-0, Local Print      polyethylene glycol (MIRALAX) 17 GM/Dose powder Take 17 g by mouth daily as needed for constipation, Transitional       !! - Potential duplicate medications found. Please discuss with provider.        CONTINUE these medications which have NOT CHANGED    Details   apixaban ANTICOAGULANT (ELIQUIS) 2.5 MG tablet Take 2.5 mg by mouth 2 times daily, Historical      calcium carbonate (OS-JHONATHAN) 500 MG TABS Take 1 tablet by mouth daily, Historical      cholecalciferol ( ULTRA STRENGTH) 50 MCG (2000 UT) CAPS Take 1 capsule by mouth daily, Historical      methylcellulose (CITRUCEL) 500 MG TABS tablet Take 500 mg by mouth daily, Historical      simvastatin (ZOCOR) 10 MG tablet Take 1 tablet by mouth every evening, Historical           STOP taking these medications       benazepril (LOTENSIN) 40 MG tablet Comments:   Reason for Stopping:         chlorthalidone (HYGROTON) 25 MG tablet Comments:   Reason for Stopping:         metFORMIN (GLUCOPHAGE XR) 500 MG 24 hr tablet Comments:   Reason for Stopping:              Allergies   No Known Allergies

## 2024-07-05 NOTE — DISCHARGE INSTRUCTIONS
"Wound Care:    Coccyx wound(s): Daily  and PRN for soiled/loose dressing  Assess skin under Mepilex every shift for signs of wound deterioration.  Cleanse with wound cleanser or saline and pat dry.  Swab alli-wound skin with Cavilon no-sting barrier and let dry.  Apply a sacral Mepilex over wound, ensuring dressing conforms to deep gluteal cleft and completely covers wound bed.  Follow PIP Plan. See [facility] nurse note or \"skin care precautions\" for detailed orders.     Pressure Injury Prevention (PIP) Plan:  -If patient is declining pressure injury prevention interventions: Explore reason why and address patient's concerns, Educate on pressure injury risk and prevention intervention(s), If patient is still declining, document \"informed refusal\" , and Ensure Care team is aware ( provider, charge nurse, etc)  -Mattress: Follow bed algorithm, reassess daily and order specialty mattress, if indicated.  -HOB: Maintain at or below 30 degrees, unless contraindicated  -Repositioning in bed: Every 1-2 hours , Left/right positioning; avoid supine, and Raise foot of bed prior to raising head of bed, to reduce patient sliding down (shear)  -Heels: Keep elevated off mattress  -Protective Dressing: None  -Positioning Equipment: Pillows  -Chair positioning: Chair cushion, Assist patient to reposition hourly, and Do NOT use a donut for sitting (this increases pressure to smaller area and creates a higher potential for injury)    -Moisture Management: Perineal cleansing /protection: Follow Incontinence Protocol, Avoid brief in bed, Clean and dry skin folds with bathing , and Moisturize dry skin  -Under Devices: Inspect skin under all medical devices during skin inspection , Ensure tubes are stabilized without tension, and Ensure patient is not lying on medical devices or equipment when repositioned  "

## 2024-07-05 NOTE — PLAN OF CARE
Diagnosis: L hip ORIF w/ nailing  POD#: 11  Mental Status: A&Ox4  Activity/dangle up 1 GB walker  Diet: mod carb 1500mL fluid restriction  Pain: tylenol  Tiwari/Voiding: BR  02/LDA: RA. Iv SL  D/C Date: TCU 7/5  Other Info: changed hip dressing, dressing CDI. Cms intact. Blood sugar checks. PRN Seroquel

## 2024-07-05 NOTE — PROGRESS NOTES
Care Management Discharge Note    Discharge Date: 07/05/2024       Discharge Disposition: Transitional Care    Discharge Services:      Discharge DME: Walker    Discharge Transportation: family or friend will provide    Private pay costs discussed: private room/amenity fees and transportation costs    Does the patient's insurance plan have a 3 day qualifying hospital stay waiver?  No    PAS Confirmation Code: [id number]  Patient/family educated on Medicare website which has current facility and service quality ratings: yes    Education Provided on the Discharge Plan:    Persons Notified of Discharge Plans: yes  Patient/Family in Agreement with the Plan: yes    Handoff Referral Completed: No    Additional Information:  Patient has a ride today between 8850-2042 via [employer] wheelchair to go to [facility] TCU. Call to Namita in admissions, [phone] to confirm plan, message left. PAS completed.    PAS-RR    D: Per DHS regulation, SW completed and submitted PAS-RR to [place] [employer] Direct Connect via the Senior LinkAge Line.  PAS-RR confirmation # is : [id number]    I: SW spoke with patient and they are aware a PAS-RR has been submitted.  SW reviewed with patient that they may be contacted for a follow up appointment within 10 days of hospital discharge if their SNF stay is < 30 days.  Contact information for [facility] LinkAge Line was also provided.    A: patient verbalized understanding.    P: Further questions may be directed to [facility] LinkAge Line at #[phone], option #4 for PAS-RR staff.    Aleksey returned call and can accept patient. Orders received for discharge and faxed to [facility].     GENESIS Lisa

## 2024-07-05 NOTE — PLAN OF CARE
Diagnosis: Open reduction internal fixation of left intertrochanteric fracture with short intramedullary nail  POD#:11  Mental Status: A&O x4  Activity/dangle: Ast of 1 GB/W  Diet: Mod Carb diet w/ 1500 mL fluid restriction  Pain: Managed with PRN Tylenol and Oxycodone  Tiwari/Voiding: Voiding adequately in the bathroom  Tele/Restraints/Iso: N/A  02/LDA: Room air. PIV removed  Other Info: CMS intact. Changed L Hip dressing, CDI. Gave copy of AVS to son. All belongings packed and sent with son. Discharge to TCU via wheelchair ride.

## 2024-07-05 NOTE — PLAN OF CARE
Diagnosis:Open reduction internal fixation of left intertrochanteric fracture with short intramedullary nail  POD#:10  Mental Status:A&O x4  Activity/dangle: Ast of 1 GB/W  Diet: Mod Carb diet w/ 1500 mL fluid restriction  Pain: Managed with PRN Tylenol and Oxycodone  Tiwari/Voiding: Voiding adequately in the bathroom  Tele/Restraints/Iso: N/A  02/LDA: Room air. IV saline locked  D/C Date: TCU 7/5/2024  Other Info: CMS intact. Changed L Hip dressing, CDI.

## 2024-07-05 NOTE — PLAN OF CARE
"Physical Therapy Discharge Summary    Reason for therapy discharge:    Discharged to transitional care facility.    Progress towards therapy goal(s). See goals on Care Plan in [facility] electronic health record for goal details.  Goals partially met.  Barriers to achieving goals:   discharge from facility.    Therapy recommendation(s):    Pt not seen by discharging therapist, per prior treating PT: \"Continue to recommend TCU as pt below baseline functional mobility, will benefit from TCU to improve IND as patient was active and IND prior to admission. Pt motivated to participate in therapy.\"      "

## 2024-07-07 LAB — BACTERIA UR CULT: ABNORMAL

## 2024-07-11 NOTE — ED TRIAGE NOTES
fell making bed, lives independently, left hip pain shortened and rotated, B 198 for ems, fell onto carpet, hit left side first and then head, no loc no lacerations on eliquis, last ate yesterday, has not taken todays meds, did have some water this morning, ems gave fent 50 litzy      Department of Anesthesiology  Preprocedure Note       Name:  Shauna Gaines   Age:  89 y.o.  :  1935                                          MRN:  [id number]         Date:  2024      Surgeon: Surgeon(s):  Sharla Chavira MD    Procedure: Procedure(s):  Pacemaker lead extraction transvenous    Medications prior to admission:   Prior to Admission medications    Medication Sig Start Date End Date Taking? Authorizing Provider   doxycycline hyclate (VIBRA-TABS) 100 MG tablet Take 1 tablet by mouth 2 times daily for 10 days Take with food 24  Yudelka Lockett APRN - CNP   metoprolol succinate (TOPROL XL) 50 MG extended release tablet Take 1 tablet by mouth in the morning and at bedtime 24   Dann Lin APRN - CNP   furosemide (LASIX) 20 MG tablet Take 2 tablets by mouth daily 24   Dann Lin APRN - CNP   pantoprazole (PROTONIX) 40 MG tablet Take 1 tablet by mouth every morning (before breakfast) 24   Dann Lin APRN - CNP   docusate sodium (COLACE) 100 MG capsule Take 1 capsule by mouth nightly    Stephania Hardy MD   ELIQUIS 2.5 MG TABS tablet Take 1 tablet by mouth 2 times daily 24   Ritika Tripathi MD   potassium chloride (KLOR-CON M) 20 MEQ extended release tablet Take 1 tablet by mouth daily 24   Ritika Tripathi MD   SYMBICORT 80-4.5 MCG/ACT AERO Inhale 2 puffs into the lungs in the morning and 2 puffs in the evening. 23   Stephania Hardy MD   simvastatin (ZOCOR) 20 MG tablet Take 1 tablet by mouth daily    Stephania Hardy MD   Cholecalciferol (VITAMIN D3) 50 MCG (2000) CAPS Take 1 capsule by mouth daily    Stephania Hardy MD   Multiple Vitamins-Minerals (PROSIGHT) TABS Take 1 tablet by mouth daily    Stephania Hardy MD   albuterol (PROVENTIL) (2.5 MG/3ML) 0.083% nebulizer solution Take 3 mLs by nebulization in the morning and at bedtime 19   Stephania Hardy MD       Current medications:    Current

## 2024-07-28 ENCOUNTER — LAB REQUISITION (OUTPATIENT)
Dept: LAB | Facility: CLINIC | Age: 89
End: 2024-07-28
Payer: COMMERCIAL

## 2024-07-28 DIAGNOSIS — I50.20 UNSPECIFIED SYSTOLIC (CONGESTIVE) HEART FAILURE (H): ICD-10-CM

## 2024-07-29 ENCOUNTER — LAB REQUISITION (OUTPATIENT)
Dept: LAB | Facility: CLINIC | Age: 89
End: 2024-07-29
Payer: COMMERCIAL

## 2024-07-29 DIAGNOSIS — Z99.2 DEPENDENCE ON RENAL DIALYSIS (H): ICD-10-CM

## 2024-07-29 DIAGNOSIS — E11.22 TYPE 2 DIABETES MELLITUS WITH DIABETIC CHRONIC KIDNEY DISEASE (H): ICD-10-CM

## 2024-07-29 DIAGNOSIS — N18.6 END STAGE RENAL DISEASE (H): ICD-10-CM

## 2024-07-30 ENCOUNTER — DOCUMENTATION ONLY (OUTPATIENT)
Dept: OTHER | Facility: CLINIC | Age: 89
End: 2024-07-30
Payer: COMMERCIAL

## 2024-07-31 ENCOUNTER — DOCUMENTATION ONLY (OUTPATIENT)
Dept: GERIATRICS | Facility: CLINIC | Age: 89
End: 2024-07-31
Payer: COMMERCIAL

## 2024-08-01 ENCOUNTER — LAB REQUISITION (OUTPATIENT)
Dept: LAB | Facility: CLINIC | Age: 89
End: 2024-08-01
Payer: COMMERCIAL

## 2024-08-01 DIAGNOSIS — E87.1 HYPO-OSMOLALITY AND HYPONATREMIA: ICD-10-CM

## 2024-08-01 LAB
ANION GAP SERPL CALCULATED.3IONS-SCNC: 13 MMOL/L (ref 7–15)
BUN SERPL-MCNC: 38.2 MG/DL (ref 8–23)
CALCIUM SERPL-MCNC: 8.4 MG/DL (ref 8.8–10.4)
CHLORIDE SERPL-SCNC: 99 MMOL/L (ref 98–107)
CREAT SERPL-MCNC: 0.97 MG/DL (ref 0.51–0.95)
EGFRCR SERPLBLD CKD-EPI 2021: 55 ML/MIN/1.73M2
GLUCOSE SERPL-MCNC: 117 MG/DL (ref 70–99)
HCO3 SERPL-SCNC: 23 MMOL/L (ref 22–29)
POTASSIUM SERPL-SCNC: 5.1 MMOL/L (ref 3.4–5.3)
SODIUM SERPL-SCNC: 135 MMOL/L (ref 135–145)

## 2024-08-01 PROCEDURE — 80048 BASIC METABOLIC PNL TOTAL CA: CPT | Mod: ORL | Performed by: INTERNAL MEDICINE

## 2024-08-06 VITALS
SYSTOLIC BLOOD PRESSURE: 133 MMHG | TEMPERATURE: 98.4 F | HEIGHT: 63 IN | RESPIRATION RATE: 15 BRPM | DIASTOLIC BLOOD PRESSURE: 62 MMHG | HEART RATE: 64 BPM | OXYGEN SATURATION: 97 % | WEIGHT: 140.4 LBS | BODY MASS INDEX: 24.88 KG/M2

## 2024-08-06 PROBLEM — I48.0 INTERMITTENT ATRIAL FIBRILLATION (H): Status: ACTIVE | Noted: 2021-09-23

## 2024-08-06 PROBLEM — I44.2 COMPLETE ATRIOVENTRICULAR BLOCK (H): Status: ACTIVE | Noted: 2021-05-09

## 2024-08-06 PROBLEM — N18.31 CHRONIC KIDNEY DISEASE, STAGE 3A (H): Status: ACTIVE | Noted: 2024-08-06

## 2024-08-06 PROBLEM — Z96.641 HISTORY OF TOTAL RIGHT HIP ARTHROPLASTY: Status: ACTIVE | Noted: 2021-05-11

## 2024-08-06 PROBLEM — I36.1 NON-RHEUMATIC TRICUSPID VALVE INSUFFICIENCY: Status: ACTIVE | Noted: 2017-06-01

## 2024-08-06 PROBLEM — M15.0 PRIMARY OSTEOARTHRITIS INVOLVING MULTIPLE JOINTS: Status: ACTIVE | Noted: 2017-06-01

## 2024-08-06 PROBLEM — M81.8 OTHER OSTEOPOROSIS WITHOUT CURRENT PATHOLOGICAL FRACTURE: Status: ACTIVE | Noted: 2017-06-01

## 2024-08-06 PROBLEM — I34.0 MITRAL REGURGITATION: Status: ACTIVE | Noted: 2018-05-31

## 2024-08-06 PROBLEM — Z86.718 HISTORY OF BLOOD CLOTS: Status: ACTIVE | Noted: 2021-04-16

## 2024-08-06 PROBLEM — N18.4 CKD (CHRONIC KIDNEY DISEASE) STAGE 4, GFR 15-29 ML/MIN (H): Status: ACTIVE | Noted: 2020-12-02

## 2024-08-06 RX ORDER — ALBUTEROL SULFATE 0.83 MG/ML
2.5 SOLUTION RESPIRATORY (INHALATION) EVERY 4 HOURS PRN
COMMUNITY
Start: 2024-07-25 | End: 2024-08-21

## 2024-08-06 RX ORDER — TRAZODONE HYDROCHLORIDE 50 MG/1
1 TABLET, FILM COATED ORAL AT BEDTIME
COMMUNITY
Start: 2024-07-25 | End: 2024-08-07

## 2024-08-06 RX ORDER — ACETAMINOPHEN 500 MG
1000 TABLET ORAL 3 TIMES DAILY
COMMUNITY
End: 2024-08-07

## 2024-08-06 RX ORDER — BUMETANIDE 0.5 MG/1
0.5 TABLET ORAL DAILY
COMMUNITY
End: 2024-08-07 | Stop reason: DRUGHIGH

## 2024-08-06 NOTE — PROGRESS NOTES
[facility] GERIATRICS    PRIMARY CARE PROVIDER AND CLINIC:  Shirley Ackerman, LYN CNP, [address] [facility] / [facility] [address]    Chief Complaint   Patient presents with    \[facility]\"" Care      [place] Medical Record Number:  [id number]  Place of Service where encounter took place:  [facility])([facility]) [[id number]]    Lisa Meraz  is a 89 year old  (4/11/1934), living in above facility since 12/12/2019 and now choosing to change PCPs to [facility].       HPI:      PMH: CHF, s/p pacemaker, HTN, atrial fibrillation, diabetes    Recent hospitalization at [facility] 6/23-7/5/24 due to fall resulting in L femur fracture, s/p ORIF of L intertrochanteric fracture with short medullary nail 6/24/24. Postop urinary retention, required straight cath. UC pending, but started on macrobid upon discharge. Also noted to develop ANDREA, hyperkalemia and hyponatremia, Nephrology following. Started fluid restriction, started Ure Na BID, dc'd PTA chlorthalidone and benazepril. Postop hypoxia, CT chest + large R pleural effusion, s/p thoracentesis on 6/28/24.     Resides at [facility].       Today's concerns:    During exam, patient seen sitting in wheelchair w/Rita (daughter) present. Reports doing ok, endorses new onset of L ribcage pain x 1-2 weeks that lasts a few seconds and occurs approx 2-3 times/day, has been taking tylenol w/relief. Recently dc'd senna-s on 8/5/24, states bowel movements have been more formed. Ambulates w/walker. Denies coughing. Denies urinary issues. Significant BLE lymphedema started during TCU stay, requires wrapping via home care RN. Stage III pressure ulcer to sacrum. Denies pain. Appetite poor. Baseline weight around 110lbs. Eats good breakfast, eats small meal for lunch or skips lunch (drank glucerna), then eats dinner 50%. Denies chest pain, SOB, headache, syncope. Goal is to improve strength and independence with ADLs.       CODE STATUS/ADVANCE DIRECTIVES DISCUSSION:  No CPR- Do NOT  Intubate      ALLERGIES: No Known Allergies   PAST MEDICAL HISTORY:   Past Medical History:   Diagnosis Date    Acute cystitis with hematuria     DM due to underlying condition with ketoacidosis with coma (H)     Generalized edema     Hypo-osmolality and hyponatremia     Hypokalemia     Intertrochanteric fracture of left femur, closed, with routine healing, subsequent encounter     Mixed hyperlipidemia     Non-pressure chronic ulcer of back with unspecified severity (H)     Other acute postprocedural pain     Other iron deficiency anemias     PAF (paroxysmal atrial fibrillation) (H)     Pleural effusion, not elsewhere classified     Psychophysiological insomnia     Retention of urine, unspecified     Type 2 diabetes mellitus with other specified complication (H)     Unspecified severe protein-calorie malnutrition (H24)     Unspecified systolic (congestive) heart failure (H)     Unsteadiness on feet       PAST SURGICAL HISTORY:   has a past surgical history that includes Open reduction internal fixation hip nailing (Left, 06/24/2024); appendectomy; and tonsillectomy.  FAMILY HISTORY: family history includes Arthritis in her mother; CABG in her father; Cerebrovascular Disease in her sister; Diabetes in her mother; Heart Disease in her father; Prostate Cancer in her father.  SOCIAL HISTORY:   reports that she has never smoked. She has never used smokeless tobacco. She reports current alcohol use. She reports that she does not use drugs.  Patient's living condition: lives in an assisted living facility      Post Discharge Medication Reconciliation Status:   MED REC REQUIRED  Post Medication Reconciliation Status: discharge medications reconciled and changed, per note/orders       Current Outpatient Medications   Medication Sig Dispense Refill    acetaminophen (TYLENOL) 500 MG tablet Take 2 tablets (1,000 mg) by mouth 3 times daily 7:30am, 1pm, 9pm 540 tablet 3    albuterol (PROVENTIL) (2.5 MG/3ML) 0.083% neb solution  "Take 2.5 mg by nebulization every 4 hours as needed for shortness of breath or wheezing      apixaban ANTICOAGULANT (ELIQUIS) 2.5 MG tablet Take 1 tablet (2.5 mg) by mouth 2 times daily 180 tablet 3    bumetanide (BUMEX) 1 MG tablet Take 1 mg by mouth daily      calcium carbonate (OS-JHONATHAN) 500 MG tablet Take 1 tablet (500 mg) by mouth daily 30 tablet 11    carvedilol (COREG) 6.25 MG tablet Give 1 tablet two times daily with meals. Hold for SBP < 100 or HR < 60 180 tablet 3    cholecalciferol ( ULTRA STRENGTH) 50 MCG (2000 UT) CAPS Take 1 capsule by mouth daily 90 capsule 3    glucose 40 % (400 mg/mL) gel Take 15-30 g by mouth every 15 minutes as needed for low blood sugar      metFORMIN (GLUCOPHAGE) 500 MG tablet Take 2 tablets (1,000 mg) by mouth 2 times daily (with meals) 360 tablet 3    methocarbamol (ROBAXIN) 500 MG tablet Take 0.5 tablets (250 mg) by mouth 3 times daily as needed for muscle spasms 20 tablet 0    methylcellulose (CITRUCEL) 500 MG TABS tablet Take 1 tablet (500 mg) by mouth daily (FIBER) 90 tablet 3    multivitamin w/minerals (THERA-VIT-M) tablet Take 1 tablet by mouth daily 90 tablet 3    polyethylene glycol (MIRALAX) 17 GM/Dose powder Take 17 g by mouth daily as needed for constipation      simvastatin (ZOCOR) 10 MG tablet Take 1 tablet (10 mg) by mouth every evening 90 tablet 3    tamsulosin (FLOMAX) 0.4 MG capsule Take 1 capsule (0.4 mg) by mouth daily 90 capsule 3    traZODone (DESYREL) 50 MG tablet Take 0.5 tablets (25 mg) by mouth at bedtime 45 tablet 3     No current facility-administered medications for this visit.         ROS:  10 point ROS of systems including Constitutional, Eyes, Respiratory, Cardiovascular, Gastroenterology, Genitourinary, Integumentary, Musculoskeletal, Psychiatric were all negative except for pertinent positives noted in my HPI.      Vitals:  /62   Pulse 64   Temp 98.4  F (36.9  C)   Resp 15   Ht 1.6 m (5' 3\")   Wt 63.7 kg (140 lb 6.4 oz)   SpO2 " 97%   BMI 24.87 kg/m    Exam:  GENERAL APPEARANCE:  Alert, in no distress, appears healthy, oriented, cooperative  ENT:  Mouth and posterior oropharynx normal, moist mucous membranes, [place]  EYES:  EOM, conjunctivae, lids, pupils and irises normal, PERRL  RESP:  respiratory effort and palpation of chest normal, lungs clear to auscultation , no respiratory distress  CV:  regular rate and rhythm, no murmur, rub, or gallop; BLE lymphedema  ABDOMEN:  normal bowel sounds, soft, nontender, no guarding or rebound  M/S:   Gait and station abnormal, ambulates w/walker. Has wheelchair.   SKIN:  Inspection of skin and subcutaneous tissue baseline, Palpation of skin and subcutaneous tissue baseline  NEURO:   Cranial nerves 2-12 are normal tested and grossly at patient's baseline, Examination of sensation by touch normal  PSYCH:  oriented X 3, affect and mood normal      Lab/Diagnostic data:  Recent labs in [facility] reviewed by me today.           Liver Function Studies -   Recent Labs   Lab Test 07/04/24  0719 07/01/24  0718 06/28/24  0714 06/23/24  1046   PROTTOTAL  --   --  6.0* 6.7   ALBUMIN 2.9*   < >  --  4.0   BILITOTAL  --   --   --  0.4   ALKPHOS  --   --   --  50   AST  --   --   --  27   ALT  --   --   --  27    < > = values in this interval not displayed.       Ferritin   Date Value Ref Range Status   07/02/2024 41 11 - 328 ng/mL Final     Iron   Date Value Ref Range Status   07/02/2024 27 (L) 37 - 145 ug/dL Final     Iron Binding Capacity   Date Value Ref Range Status   07/02/2024 272 240 - 430 ug/dL Final        Latest Reference Range & Units 06/23/24 10:46   Vitamin D, Total (25-Hydroxy) 20 - 50 ng/mL 67 (H)        Latest Reference Range & Units 06/27/24 15:07   N-Terminal Pro BNP Inpatient 0 - 1,800 pg/mL 25,311 (H)       TSH   Date Value Ref Range Status   06/29/2024 4.86 (H) 0.30 - 4.20 uIU/mL Final       A1c 6/3/24: 8.9%    C diff 8/2/24 negative.       Estimated Creatinine Clearance: 33.6 mL/min (A) (based on SCr  of 1 mg/dL (H)).        ASSESSMENT/PLAN:    (I50.20) Heart failure with reduced ejection fraction (H)  (primary encounter diagnosis)  (I34.0) Nonrheumatic mitral valve regurgitation  (I48.0) Intermittent atrial fibrillation (H)  (I10) Benign essential hypertension  (Z95.0) S/P placement of cardiac pacemaker  Comment: Chronic CHF, CAD, pulmonary hypertension. Last Echo 7/30/24 found preserved EF. Hx severe mitral regurgitation, mild AVS. Hx paroxysmal complete heart block s/p dual chamber permament pacemaker placement on 5/10/21.   Hx R large pleural effusion, s/p thoracentesis on 6/28/24.   BP controlled. PTA chlorthalidone and benazepril dc'd due to ANDREA and hypotension.  Plan:   - Monitor BMP  - Bumex 1mg every day started on 8/6/24  - Continue eliquis, coreg, simvastatin   - Daily weights  - Patient to follow-up with Dr. Kenney ([facility]) as directed     (Z98.890,  Z87.81) S/P ORIF (open reduction internal fixation) fracture  (R53.81) Physical deconditioning  (D64.9) Postoperative anemia  Comment: L proximal femur fracture r/t fall, s/p ORIF on 6/24/24. Postop pain controlled. Ongoing physical deconditioning. Postop anemia.   Plan:   - Check CMP, CBC, TSH, free T4, free T3, vitamin D level on 8/14/24  - Check iron level, iron saturation, ferritin level on 8/14/24   - Continue tylenol, robaxin PRN  - Continue flomax due to postop urinary retention  - Home care PT, OT following  - Continue supportive services at [facility]    (N18.31) Chronic kidney disease, stage 3a (H)  Comment: Chronic  Plan:   - Check BMP on 8/14/24    (E11.22,  N18.31) Type 2 diabetes mellitus with stage 3a chronic kidney disease, without long-term current use of insulin (H)  Comment: Controlled  Plan:  - Check BG qAM  dx diabetes  - Change administration time of metformin to 8AM and 5PM     (L89.153) Pressure injury of sacral region, stage 3 (H)  Comment: Hospital acquired stage 3 sacral pressure ulcer  Plan:   - Continue wound  care  - Reposition every 2hrs during the day  - Home care RN following  - Referral to [facility] dx stage III coccyx pressure ulcer    (F41.1) STARLA (generalized anxiety disorder)  Comment: Chronic  Plan:   - Continue trazodone   - Monitor changes in mood or behaviors      Orders:  - Check BG qAM dx diabetes  - Change administration time of metformin to 8AM and 5PM   - Change frequency of sacral pressure ulcer to every day   - Check CMP, CBC, TSH, free T4, free T3, vitamin D level, iron level, iron saturation, ferritin level on 8/14/24 dx anemia, CHF, BLE edema        Total time spent during today's visit was 103 mins including patient visit (1397-0638; 73 minutes) and review of past records (30 minutes).     Electronically signed by:  LYN Arellano CNP

## 2024-08-07 ENCOUNTER — ASSISTED LIVING VISIT (OUTPATIENT)
Dept: GERIATRICS | Facility: CLINIC | Age: 89
End: 2024-08-07
Payer: COMMERCIAL

## 2024-08-07 ENCOUNTER — TELEPHONE (OUTPATIENT)
Dept: WOUND CARE | Facility: CLINIC | Age: 89
End: 2024-08-07

## 2024-08-07 DIAGNOSIS — N18.31 TYPE 2 DIABETES MELLITUS WITH STAGE 3A CHRONIC KIDNEY DISEASE, WITHOUT LONG-TERM CURRENT USE OF INSULIN (H): ICD-10-CM

## 2024-08-07 DIAGNOSIS — I48.0 INTERMITTENT ATRIAL FIBRILLATION (H): ICD-10-CM

## 2024-08-07 DIAGNOSIS — E78.5 HYPERLIPIDEMIA LDL GOAL <70: ICD-10-CM

## 2024-08-07 DIAGNOSIS — D64.9 POSTOPERATIVE ANEMIA: ICD-10-CM

## 2024-08-07 DIAGNOSIS — K59.00 CONSTIPATION: ICD-10-CM

## 2024-08-07 DIAGNOSIS — N18.31 CHRONIC KIDNEY DISEASE, STAGE 3A (H): ICD-10-CM

## 2024-08-07 DIAGNOSIS — E11.65 TYPE 2 DIABETES MELLITUS WITH HYPERGLYCEMIA, WITHOUT LONG-TERM CURRENT USE OF INSULIN (H): ICD-10-CM

## 2024-08-07 DIAGNOSIS — I50.20 HEART FAILURE WITH REDUCED EJECTION FRACTION (H): Primary | ICD-10-CM

## 2024-08-07 DIAGNOSIS — M15.0 PRIMARY OSTEOARTHRITIS INVOLVING MULTIPLE JOINTS: Primary | ICD-10-CM

## 2024-08-07 DIAGNOSIS — Z95.0 S/P PLACEMENT OF CARDIAC PACEMAKER: ICD-10-CM

## 2024-08-07 DIAGNOSIS — Z87.81 S/P ORIF (OPEN REDUCTION INTERNAL FIXATION) FRACTURE: ICD-10-CM

## 2024-08-07 DIAGNOSIS — G47.00 INSOMNIA: ICD-10-CM

## 2024-08-07 DIAGNOSIS — I34.0 NONRHEUMATIC MITRAL VALVE REGURGITATION: ICD-10-CM

## 2024-08-07 DIAGNOSIS — E11.22 TYPE 2 DIABETES MELLITUS WITH STAGE 3A CHRONIC KIDNEY DISEASE, WITHOUT LONG-TERM CURRENT USE OF INSULIN (H): ICD-10-CM

## 2024-08-07 DIAGNOSIS — S72.145A CLOSED NONDISPLACED INTERTROCHANTERIC FRACTURE OF LEFT FEMUR, INITIAL ENCOUNTER (H): ICD-10-CM

## 2024-08-07 DIAGNOSIS — I10 BENIGN ESSENTIAL HYPERTENSION: ICD-10-CM

## 2024-08-07 DIAGNOSIS — R33.9 URINARY RETENTION: ICD-10-CM

## 2024-08-07 DIAGNOSIS — F41.1 GAD (GENERALIZED ANXIETY DISORDER): ICD-10-CM

## 2024-08-07 DIAGNOSIS — M81.8 OTHER OSTEOPOROSIS WITHOUT CURRENT PATHOLOGICAL FRACTURE: ICD-10-CM

## 2024-08-07 DIAGNOSIS — L89.153 PRESSURE INJURY OF SACRAL REGION, STAGE 3 (H): ICD-10-CM

## 2024-08-07 DIAGNOSIS — I50.9 CONGESTIVE HEART FAILURE, UNSPECIFIED HF CHRONICITY, UNSPECIFIED HEART FAILURE TYPE (H): ICD-10-CM

## 2024-08-07 DIAGNOSIS — Z98.890 S/P ORIF (OPEN REDUCTION INTERNAL FIXATION) FRACTURE: ICD-10-CM

## 2024-08-07 DIAGNOSIS — R53.81 PHYSICAL DECONDITIONING: ICD-10-CM

## 2024-08-07 PROBLEM — E11.9 DIABETES MELLITUS, TYPE 2 (H): Status: ACTIVE | Noted: 2024-08-07

## 2024-08-07 LAB
ANION GAP SERPL CALCULATED.3IONS-SCNC: 16 MMOL/L (ref 7–15)
BUN SERPL-MCNC: 38.8 MG/DL (ref 8–23)
CALCIUM SERPL-MCNC: 9.4 MG/DL (ref 8.8–10.4)
CHLORIDE SERPL-SCNC: 95 MMOL/L (ref 98–107)
CREAT SERPL-MCNC: 1 MG/DL (ref 0.51–0.95)
EGFRCR SERPLBLD CKD-EPI 2021: 53 ML/MIN/1.73M2
HBA1C MFR BLD: 6.8 %
HCO3 SERPL-SCNC: 23 MMOL/L (ref 22–29)
NT-PROBNP SERPL-MCNC: 7932 PG/ML (ref 0–1800)
POTASSIUM SERPL-SCNC: 4.8 MMOL/L (ref 3.4–5.3)
SODIUM SERPL-SCNC: 134 MMOL/L (ref 135–145)

## 2024-08-07 PROCEDURE — 36415 COLL VENOUS BLD VENIPUNCTURE: CPT | Mod: ORL | Performed by: INTERNAL MEDICINE

## 2024-08-07 PROCEDURE — 99350 HOME/RES VST EST HIGH MDM 60: CPT | Performed by: NURSE PRACTITIONER

## 2024-08-07 PROCEDURE — 83036 HEMOGLOBIN GLYCOSYLATED A1C: CPT | Mod: ORL | Performed by: FAMILY MEDICINE

## 2024-08-07 PROCEDURE — P9604 ONE-WAY ALLOW PRORATED TRIP: HCPCS | Mod: ORL | Performed by: INTERNAL MEDICINE

## 2024-08-07 PROCEDURE — 80048 BASIC METABOLIC PNL TOTAL CA: CPT | Mod: ORL | Performed by: INTERNAL MEDICINE

## 2024-08-07 PROCEDURE — 99417 PROLNG OP E/M EACH 15 MIN: CPT | Performed by: NURSE PRACTITIONER

## 2024-08-07 PROCEDURE — 83880 ASSAY OF NATRIURETIC PEPTIDE: CPT | Mod: ORL | Performed by: INTERNAL MEDICINE

## 2024-08-07 RX ORDER — BUMETANIDE 1 MG/1
1 TABLET ORAL DAILY
COMMUNITY
Start: 2024-08-06

## 2024-08-07 RX ORDER — MULTIPLE VITAMINS W/ MINERALS TAB 9MG-400MCG
1 TAB ORAL DAILY
Qty: 90 TABLET | Refills: 3 | Status: SHIPPED | OUTPATIENT
Start: 2024-08-07

## 2024-08-07 RX ORDER — ACETAMINOPHEN 500 MG
1000 TABLET ORAL 3 TIMES DAILY
Qty: 540 TABLET | Refills: 3 | Status: SHIPPED | OUTPATIENT
Start: 2024-08-07

## 2024-08-07 RX ORDER — SIMVASTATIN 10 MG
10 TABLET ORAL EVERY EVENING
Qty: 90 TABLET | Refills: 3 | Status: SHIPPED | OUTPATIENT
Start: 2024-08-07

## 2024-08-07 RX ORDER — TAMSULOSIN HYDROCHLORIDE 0.4 MG/1
0.4 CAPSULE ORAL DAILY
Qty: 90 CAPSULE | Refills: 3 | Status: SHIPPED | OUTPATIENT
Start: 2024-08-07

## 2024-08-07 RX ORDER — TRAZODONE HYDROCHLORIDE 50 MG/1
25 TABLET, FILM COATED ORAL AT BEDTIME
Qty: 45 TABLET | Refills: 3 | Status: SHIPPED | OUTPATIENT
Start: 2024-08-07

## 2024-08-07 RX ORDER — CARVEDILOL 6.25 MG/1
TABLET ORAL
Qty: 180 TABLET | Refills: 3 | Status: SHIPPED | OUTPATIENT
Start: 2024-08-07

## 2024-08-07 RX ORDER — CALCIUM CARBONATE 500(1250)
1 TABLET ORAL DAILY
Qty: 90 TABLET | Refills: 3 | Status: SHIPPED | OUTPATIENT
Start: 2024-08-07 | End: 2024-08-07 | Stop reason: ALTCHOICE

## 2024-08-07 RX ORDER — CALCIUM CARBONATE 500(1250)
1 TABLET ORAL DAILY
Qty: 30 TABLET | Refills: 11 | Status: SHIPPED | OUTPATIENT
Start: 2024-08-07

## 2024-08-07 RX ORDER — LORAZEPAM 0.5 MG
1 TABLET ORAL DAILY
Qty: 90 CAPSULE | Refills: 3 | Status: SHIPPED | OUTPATIENT
Start: 2024-08-07

## 2024-08-07 NOTE — TELEPHONE ENCOUNTER
Consult received via Workqueue from Shirley Ackerman APRN CNP in  GERIATRICS  for wound of the coccyx.    Does the patient have an open and draining wound? Yes    Please schedule with Chetna Hudson PA-C, Ilene Ellison NP, Chriss Jewell M.D., Shaggy Sepulveda CNP, or Aroldo Moore M.D. at [facility] [place] for next available appointment.    **For all providers, Yamileth Connolly PA-C, Dr. Fraire, Ilene Ellison NP or Dr. Moore, please schedule a follow up 4 weeks after initial appointment.**  --If unable to schedule within 2-3 weeks then please place on cancellation list--    Is the patient able to make their own medical decisions? Yes    Can the patient be scheduled on a Wednesday (Teresa) or Thursday (Scot)? Yes    Is patient a CHIKIS lift? PLEASE INQUIRE WHEN MAKING THE APPOINTMENT AND PUT IN APPOINTMENT NOTES    Routing to  Wound Healing Scheduling.

## 2024-08-07 NOTE — LETTER
8/7/2024      Lisa Meraz  [facility]  [address]  [facility] [address]        [facility] GERIATRICS    PRIMARY CARE PROVIDER AND CLINIC:  LYN Arellano [facility], [address] [facility] [facility] [address] / [facility] [address]    Chief Complaint   Patient presents with    [facility] Medical Record Number:  [id number]  Place of Service where encounter took place:  [facility] [[id number]]    Lisa Meraz  is a 89 year old  (4/11/1934), living in above facility since 12/12/2019 and now choosing to change PCPs to [facility].       HPI:      PMH: CHF, s/p pacemaker, HTN, atrial fibrillation, diabetes    Recent hospitalization at [facility] 6/23-7/5/24 due to fall resulting in L femur fracture, s/p ORIF of L intertrochanteric fracture with short medullary nail 6/24/24. Postop urinary retention, required straight cath. UC pending, but started on macrobid upon discharge. Also noted to develop ANDREA, hyperkalemia and hyponatremia, Nephrology following. Started fluid restriction, started Ure Na BID, dc'd PTA chlorthalidone and benazepril. Postop hypoxia, CT chest + large R pleural effusion, s/p thoracentesis on 6/28/24.     Resides at [facility].       Today's concerns:    During exam, patient seen sitting in wheelchair w/Rita (daughter) present. Reports doing ok, endorses new onset of L ribcage pain x 1-2 weeks that lasts a few seconds and occurs approx 2-3 times/day, has been taking tylenol w/relief. Recently dc'd senna-s on 8/5/24, states bowel movements have been more formed. Ambulates w/walker. Denies coughing. Denies urinary issues. Significant BLE lymphedema started during TCU stay, requires wrapping via home care RN. Stage III pressure ulcer to sacrum. Denies pain. Appetite poor. Baseline weight around 110lbs. Eats good breakfast, eats small meal for lunch or skips lunch (drank glucerna), then eats dinner 50%. Denies chest pain, SOB, headache, syncope. Goal is to improve  strength and independence with ADLs.       CODE STATUS/ADVANCE DIRECTIVES DISCUSSION:  No CPR- Do NOT Intubate      ALLERGIES: No Known Allergies   PAST MEDICAL HISTORY:   Past Medical History:   Diagnosis Date    Acute cystitis with hematuria     DM due to underlying condition with ketoacidosis with coma (H)     Generalized edema     Hypo-osmolality and hyponatremia     Hypokalemia     Intertrochanteric fracture of left femur, closed, with routine healing, subsequent encounter     Mixed hyperlipidemia     Non-pressure chronic ulcer of back with unspecified severity (H)     Other acute postprocedural pain     Other iron deficiency anemias     PAF (paroxysmal atrial fibrillation) (H)     Pleural effusion, not elsewhere classified     Psychophysiological insomnia     Retention of urine, unspecified     Type 2 diabetes mellitus with other specified complication (H)     Unspecified severe protein-calorie malnutrition (H24)     Unspecified systolic (congestive) heart failure (H)     Unsteadiness on feet       PAST SURGICAL HISTORY:   has a past surgical history that includes Open reduction internal fixation hip nailing (Left, 06/24/2024); appendectomy; and tonsillectomy.  FAMILY HISTORY: family history includes Arthritis in her mother; CABG in her father; Cerebrovascular Disease in her sister; Diabetes in her mother; Heart Disease in her father; Prostate Cancer in her father.  SOCIAL HISTORY:   reports that she has never smoked. She has never used smokeless tobacco. She reports current alcohol use. She reports that she does not use drugs.  Patient's living condition: lives in an assisted living facility      Post Discharge Medication Reconciliation Status:   MED REC REQUIRED  Post Medication Reconciliation Status: discharge medications reconciled and changed, per note/orders       Current Outpatient Medications   Medication Sig Dispense Refill    acetaminophen (TYLENOL) 500 MG tablet Take 2 tablets (1,000 mg) by mouth 3  times daily 7:30am, 1pm, 9pm 540 tablet 3    albuterol (PROVENTIL) (2.5 MG/3ML) 0.083% neb solution Take 2.5 mg by nebulization every 4 hours as needed for shortness of breath or wheezing      apixaban ANTICOAGULANT (ELIQUIS) 2.5 MG tablet Take 1 tablet (2.5 mg) by mouth 2 times daily 180 tablet 3    bumetanide (BUMEX) 1 MG tablet Take 1 mg by mouth daily      calcium carbonate (OS-JHONATHAN) 500 MG tablet Take 1 tablet (500 mg) by mouth daily 30 tablet 11    carvedilol (COREG) 6.25 MG tablet Give 1 tablet two times daily with meals. Hold for SBP < 100 or HR < 60 180 tablet 3    cholecalciferol ( ULTRA STRENGTH) 50 MCG (2000 UT) CAPS Take 1 capsule by mouth daily 90 capsule 3    glucose 40 % (400 mg/mL) gel Take 15-30 g by mouth every 15 minutes as needed for low blood sugar      metFORMIN (GLUCOPHAGE) 500 MG tablet Take 2 tablets (1,000 mg) by mouth 2 times daily (with meals) 360 tablet 3    methocarbamol (ROBAXIN) 500 MG tablet Take 0.5 tablets (250 mg) by mouth 3 times daily as needed for muscle spasms 20 tablet 0    methylcellulose (CITRUCEL) 500 MG TABS tablet Take 1 tablet (500 mg) by mouth daily (FIBER) 90 tablet 3    multivitamin w/minerals (THERA-VIT-M) tablet Take 1 tablet by mouth daily 90 tablet 3    polyethylene glycol (MIRALAX) 17 GM/Dose powder Take 17 g by mouth daily as needed for constipation      simvastatin (ZOCOR) 10 MG tablet Take 1 tablet (10 mg) by mouth every evening 90 tablet 3    tamsulosin (FLOMAX) 0.4 MG capsule Take 1 capsule (0.4 mg) by mouth daily 90 capsule 3    traZODone (DESYREL) 50 MG tablet Take 0.5 tablets (25 mg) by mouth at bedtime 45 tablet 3     No current facility-administered medications for this visit.         ROS:  10 point ROS of systems including Constitutional, Eyes, Respiratory, Cardiovascular, Gastroenterology, Genitourinary, Integumentary, Musculoskeletal, Psychiatric were all negative except for pertinent positives noted in my HPI.      Vitals:  /62   Pulse  "64   Temp 98.4  F (36.9  C)   Resp 15   Ht 1.6 m (5' 3\")   Wt 63.7 kg (140 lb 6.4 oz)   SpO2 97%   BMI 24.87 kg/m    Exam:  GENERAL APPEARANCE:  Alert, in no distress, appears healthy, oriented, cooperative  ENT:  Mouth and posterior oropharynx normal, moist mucous membranes, [place]  EYES:  EOM, conjunctivae, lids, pupils and irises normal, PERRL  RESP:  respiratory effort and palpation of chest normal, lungs clear to auscultation , no respiratory distress  CV:  regular rate and rhythm, no murmur, rub, or gallop; BLE lymphedema  ABDOMEN:  normal bowel sounds, soft, nontender, no guarding or rebound  M/S:   Gait and station abnormal, ambulates w/walker. Has wheelchair.   SKIN:  Inspection of skin and subcutaneous tissue baseline, Palpation of skin and subcutaneous tissue baseline  NEURO:   Cranial nerves 2-12 are normal tested and grossly at patient's baseline, Examination of sensation by touch normal  PSYCH:  oriented X 3, affect and mood normal      Lab/Diagnostic data:  Recent labs in [facility] reviewed by me today.           Liver Function Studies -   Recent Labs   Lab Test 07/04/24  0719 07/01/24  0718 06/28/24  0714 06/23/24  1046   PROTTOTAL  --   --  6.0* 6.7   ALBUMIN 2.9*   < >  --  4.0   BILITOTAL  --   --   --  0.4   ALKPHOS  --   --   --  50   AST  --   --   --  27   ALT  --   --   --  27    < > = values in this interval not displayed.       Ferritin   Date Value Ref Range Status   07/02/2024 41 11 - 328 ng/mL Final     Iron   Date Value Ref Range Status   07/02/2024 27 (L) 37 - 145 ug/dL Final     Iron Binding Capacity   Date Value Ref Range Status   07/02/2024 272 240 - 430 ug/dL Final        Latest Reference Range & Units 06/23/24 10:46   Vitamin D, Total (25-Hydroxy) 20 - 50 ng/mL 67 (H)        Latest Reference Range & Units 06/27/24 15:07   N-Terminal Pro BNP Inpatient 0 - 1,800 pg/mL 25,311 (H)       TSH   Date Value Ref Range Status   06/29/2024 4.86 (H) 0.30 - 4.20 uIU/mL Final       A1c 6/3/24: " 8.9%    C diff 8/2/24 negative.       Estimated Creatinine Clearance: 33.6 mL/min (A) (based on SCr of 1 mg/dL (H)).        ASSESSMENT/PLAN:    (I50.20) Heart failure with reduced ejection fraction (H)  (primary encounter diagnosis)  (I34.0) Nonrheumatic mitral valve regurgitation  (I48.0) Intermittent atrial fibrillation (H)  (I10) Benign essential hypertension  (Z95.0) S/P placement of cardiac pacemaker  Comment: Chronic CHF, CAD, pulmonary hypertension. Last Echo 7/30/24 found preserved EF. Hx severe mitral regurgitation, mild AVS. Hx paroxysmal complete heart block s/p dual chamber permament pacemaker placement on 5/10/21.   Hx R large pleural effusion, s/p thoracentesis on 6/28/24.   BP controlled. PTA chlorthalidone and benazepril dc'd due to ANDREA and hypotension.  Plan:   - Monitor BMP  - Bumex 1mg every day started on 8/6/24  - Continue eliquis, coreg, simvastatin   - Daily weights  - Patient to follow-up with Dr. Kenney ([facility]) as directed     (Z98.890,  Z87.81) S/P ORIF (open reduction internal fixation) fracture  (R53.81) Physical deconditioning  (D64.9) Postoperative anemia  Comment: L proximal femur fracture r/t fall, s/p ORIF on 6/24/24. Postop pain controlled. Ongoing physical deconditioning. Postop anemia.   Plan:   - Check CMP, CBC, TSH, free T4, free T3, vitamin D level on 8/14/24  - Check iron level, iron saturation, ferritin level on 8/14/24   - Continue tylenol, robaxin PRN  - Continue flomax due to postop urinary retention  - Home care PT, OT following  - Continue supportive services at [facility]    (N18.31) Chronic kidney disease, stage 3a (H)  Comment: Chronic  Plan:   - Check BMP on 8/14/24    (E11.22,  N18.31) Type 2 diabetes mellitus with stage 3a chronic kidney disease, without long-term current use of insulin (H)  Comment: Controlled  Plan:  - Check BG qAM  dx diabetes  - Change administration time of metformin to 8AM and 5PM     (L89.153) Pressure injury of sacral region,  stage 3 (H)  Comment: Hospital acquired stage 3 sacral pressure ulcer  Plan:   - Continue wound care  - Reposition every 2hrs during the day  - Home care RN following  - Referral to [facility] dx stage III coccyx pressure ulcer    (F41.1) STARLA (generalized anxiety disorder)  Comment: Chronic  Plan:   - Continue trazodone   - Monitor changes in mood or behaviors      Orders:  - Check BG qAM dx diabetes  - Change administration time of metformin to 8AM and 5PM   - Change frequency of sacral pressure ulcer to every day   - Check CMP, CBC, TSH, free T4, free T3, vitamin D level, iron level, iron saturation, ferritin level on 24 dx anemia, CHF, BLE edema        Total time spent during today's visit was 103 mins including patient visit (6280-5423; 73 minutes) and review of past records (30 minutes).     Electronically signed by:  LYN Arellano CNP                [facility]   2024     Name: Lisachanel Meraz   : 1934       Orders:  - Check BG qAM dx diabetes  - Change administration time of metformin to 8AM and 5PM   - Change frequency of sacral pressure ulcer to every day   - Check CMP, CBC, TSH, free T4, free T3, vitamin D level, iron level, iron saturation, ferritin level on 24 dx anemia, CHF, BLE edema        Electronically signed by   LYN Arellano CNP on 2024 at 11:36 PM        Sincerely,        LYN Arellano CNP

## 2024-08-08 ENCOUNTER — TELEPHONE (OUTPATIENT)
Dept: GERIATRICS | Facility: CLINIC | Age: 89
End: 2024-08-08
Payer: COMMERCIAL

## 2024-08-08 LAB — GLUCOSE SERPL-MCNC: 119 MG/DL (ref 70–99)

## 2024-08-08 NOTE — PROGRESS NOTES
[facility]   2024     Name: Lisa Meraz   : 1934       Orders:  - Check BG qAM dx diabetes  - Change administration time of metformin to 8AM and 5PM   - Change frequency of sacral pressure ulcer to every day   - Check CMP, CBC, TSH, free T4, free T3, vitamin D level, iron level, iron saturation, ferritin level on 24 dx anemia, CHF, BLE edema        Electronically signed by   LYN Arellano CNP on 2024 at 11:36 PM

## 2024-08-08 NOTE — TELEPHONE ENCOUNTER
[facility] Lab Note     Provider: LYN Scott CNP   Facility: [facility]  Facility Type:  AL    No Known Allergies    Labs Reviewed by provider: BMP, BNP, HgbA1c-----from 8/7/24     Verbal Order/Direction given by Provider: Update Cardiology team with labs and also let the team know her new Bumex dose wasn't started until 8/6/24.    Provider giving Order:  LYN Scott CNP     Verbal Order given to: Fallon Florentino RN

## 2024-08-08 NOTE — TELEPHONE ENCOUNTER
Spoke with patient, she directed me to daughter to schedule appointment.  Left message for Regina at .

## 2024-08-12 ENCOUNTER — LAB REQUISITION (OUTPATIENT)
Dept: LAB | Facility: CLINIC | Age: 89
End: 2024-08-12
Payer: COMMERCIAL

## 2024-08-12 DIAGNOSIS — D64.9 ANEMIA, UNSPECIFIED: ICD-10-CM

## 2024-08-12 DIAGNOSIS — E11.22 TYPE 2 DIABETES MELLITUS WITH DIABETIC CHRONIC KIDNEY DISEASE (H): ICD-10-CM

## 2024-08-12 DIAGNOSIS — N18.31 CHRONIC KIDNEY DISEASE, STAGE 3A (H): ICD-10-CM

## 2024-08-14 LAB
ALBUMIN SERPL BCG-MCNC: 3.8 G/DL (ref 3.5–5.2)
ALP SERPL-CCNC: 99 U/L (ref 40–150)
ALT SERPL W P-5'-P-CCNC: 28 U/L (ref 0–50)
ANION GAP SERPL CALCULATED.3IONS-SCNC: 16 MMOL/L (ref 7–15)
AST SERPL W P-5'-P-CCNC: 29 U/L (ref 0–45)
BILIRUB SERPL-MCNC: 0.3 MG/DL
BUN SERPL-MCNC: 29.1 MG/DL (ref 8–23)
CALCIUM SERPL-MCNC: 9.3 MG/DL (ref 8.8–10.4)
CHLORIDE SERPL-SCNC: 93 MMOL/L (ref 98–107)
CREAT SERPL-MCNC: 1.01 MG/DL (ref 0.51–0.95)
EGFRCR SERPLBLD CKD-EPI 2021: 53 ML/MIN/1.73M2
ERYTHROCYTE [DISTWIDTH] IN BLOOD BY AUTOMATED COUNT: 20.2 % (ref 10–15)
FERRITIN SERPL-MCNC: 200 NG/ML (ref 11–328)
GLUCOSE SERPL-MCNC: 132 MG/DL (ref 70–99)
HCO3 SERPL-SCNC: 26 MMOL/L (ref 22–29)
HCT VFR BLD AUTO: 38.3 % (ref 35–47)
HGB BLD-MCNC: 11.3 G/DL (ref 11.7–15.7)
IRON BINDING CAPACITY (ROCHE): 261 UG/DL (ref 240–430)
IRON SATN MFR SERPL: 18 % (ref 15–46)
IRON SERPL-MCNC: 48 UG/DL (ref 37–145)
IRON SERPL-MCNC: 48 UG/DL (ref 37–145)
MCH RBC QN AUTO: 29.1 PG (ref 26.5–33)
MCHC RBC AUTO-ENTMCNC: 29.5 G/DL (ref 31.5–36.5)
MCV RBC AUTO: 99 FL (ref 78–100)
PLATELET # BLD AUTO: 183 10E3/UL (ref 150–450)
POTASSIUM SERPL-SCNC: 4.2 MMOL/L (ref 3.4–5.3)
PROT SERPL-MCNC: 6.1 G/DL (ref 6.4–8.3)
RBC # BLD AUTO: 3.88 10E6/UL (ref 3.8–5.2)
SODIUM SERPL-SCNC: 135 MMOL/L (ref 135–145)
T3FREE SERPL-MCNC: 1.9 PG/ML (ref 2–4.4)
T4 FREE SERPL-MCNC: 1.21 NG/DL (ref 0.9–1.7)
TSH SERPL DL<=0.005 MIU/L-ACNC: 4.61 UIU/ML (ref 0.3–4.2)
VIT D+METAB SERPL-MCNC: 52 NG/ML (ref 20–50)
WBC # BLD AUTO: 6.3 10E3/UL (ref 4–11)

## 2024-08-14 PROCEDURE — 83550 IRON BINDING TEST: CPT | Mod: ORL | Performed by: NURSE PRACTITIONER

## 2024-08-14 PROCEDURE — 84443 ASSAY THYROID STIM HORMONE: CPT | Mod: ORL | Performed by: NURSE PRACTITIONER

## 2024-08-14 PROCEDURE — P9603 ONE-WAY ALLOW PRORATED MILES: HCPCS | Mod: ORL | Performed by: NURSE PRACTITIONER

## 2024-08-14 PROCEDURE — 84481 FREE ASSAY (FT-3): CPT | Mod: ORL | Performed by: NURSE PRACTITIONER

## 2024-08-14 PROCEDURE — 83540 ASSAY OF IRON: CPT | Mod: ORL | Performed by: NURSE PRACTITIONER

## 2024-08-14 PROCEDURE — 85027 COMPLETE CBC AUTOMATED: CPT | Mod: ORL | Performed by: NURSE PRACTITIONER

## 2024-08-14 PROCEDURE — 80053 COMPREHEN METABOLIC PANEL: CPT | Mod: ORL | Performed by: NURSE PRACTITIONER

## 2024-08-14 PROCEDURE — 82728 ASSAY OF FERRITIN: CPT | Mod: ORL | Performed by: NURSE PRACTITIONER

## 2024-08-14 PROCEDURE — 84439 ASSAY OF FREE THYROXINE: CPT | Mod: ORL | Performed by: NURSE PRACTITIONER

## 2024-08-14 PROCEDURE — 82306 VITAMIN D 25 HYDROXY: CPT | Mod: ORL | Performed by: NURSE PRACTITIONER

## 2024-08-14 PROCEDURE — 36415 COLL VENOUS BLD VENIPUNCTURE: CPT | Mod: ORL | Performed by: NURSE PRACTITIONER

## 2024-08-15 ENCOUNTER — TELEPHONE (OUTPATIENT)
Dept: GERIATRICS | Facility: CLINIC | Age: 89
End: 2024-08-15
Payer: COMMERCIAL

## 2024-08-15 RX ORDER — LEVOTHYROXINE SODIUM 25 UG/1
25 TABLET ORAL DAILY
COMMUNITY
End: 2024-09-11

## 2024-08-15 NOTE — TELEPHONE ENCOUNTER
Orders relayed to facility nurseRos.      ----- Message from Shirley Ackerman sent at 8/14/2024 10:06 PM CDT -----  Add levothyroxine 25mcg every day dx hypothyroidism; please update daughter w/new orders.

## 2024-08-16 ENCOUNTER — LAB REQUISITION (OUTPATIENT)
Dept: LAB | Facility: CLINIC | Age: 89
End: 2024-08-16
Payer: COMMERCIAL

## 2024-08-16 DIAGNOSIS — I50.9 HEART FAILURE, UNSPECIFIED (H): ICD-10-CM

## 2024-08-20 VITALS
WEIGHT: 131.4 LBS | RESPIRATION RATE: 20 BRPM | OXYGEN SATURATION: 94 % | TEMPERATURE: 98.1 F | BODY MASS INDEX: 23.28 KG/M2 | DIASTOLIC BLOOD PRESSURE: 102 MMHG | HEIGHT: 63 IN | SYSTOLIC BLOOD PRESSURE: 137 MMHG | HEART RATE: 79 BPM

## 2024-08-20 NOTE — PROGRESS NOTES
"[facility] GERIATRICS    Chief Complaint   Patient presents with    RECHECK     HPI:  Lisa Meraz is a 89 year old  (4/11/1934), who is being seen today for an episodic care visit at: [facility])([facility]) [[id number]].       Today's concern is:     During exam, patient seen sitting in wheelchair. Endorses shortness of breath with talking and activity. Approx 10lb weight loss over the past 2 weeks, noted improvement in BLE edema. States home care OT recommending lymph wraps. Continues to have L sided rib pain intermittent 2-3 times/day. Pain improves w/activity. Reports having daily bowel movements, states bowel movements are more formed. Continues to have poor appetite, eats 2 meals per day in dining room (breakfast and dinner). Last colonoscopy reports being normal. Not sleeping well at night, uses bathroom a few times at night.       Allergies, and PMH/PSH reviewed in [facility] today.    REVIEW OF SYSTEMS:  4 point ROS including Respiratory, CV, GI and , other than that noted in the HPI,  is negative      Objective:   BP (!) 137/102   Pulse 79   Temp 98.1  F (36.7  C)   Resp 20   Ht 1.6 m (5' 3\")   Wt 59.6 kg (131 lb 6.4 oz)   SpO2 94%   BMI 23.28 kg/m    GENERAL APPEARANCE:  Alert, in no distress, appears healthy, oriented, cooperative  RESP:  poor respiratory effort, lungs diminished throughout, no respiratory distress  CV:  regular rate and rhythm, no murmur, rub, or gallop; BLE lymphedema improving  ABDOMEN:  normal bowel sounds, soft, nontender, no guarding or rebound  M/S:   Gait and station abnormal, ambulates w/walker. Has wheelchair.   SKIN:  Inspection of skin and subcutaneous tissue baseline, Palpation of skin and subcutaneous tissue baseline  NEURO:   Cranial nerves 2-12 are normal tested and grossly at patient's baseline, Examination of sensation by touch normal  PSYCH:  oriented X 3, affect and mood normal              Recent labs in [facility] reviewed by me today.    Most Recent 3 " CBC's:  Recent Labs   Lab Test 08/14/24  1135 07/05/24  0746 07/04/24  0719   WBC 6.3 18.4* 19.6*   HGB 11.3* 8.5* 8.8*   MCV 99 88 87    305 313     Most Recent 3 BMP's:  Recent Labs   Lab Test 08/14/24  1135 08/07/24  1448 08/01/24  1218    134* 135   POTASSIUM 4.2 4.8 5.1   CHLORIDE 93* 95* 99   CO2 26 23 23   BUN 29.1* 38.8* 38.2*   CR 1.01* 1.00* 0.97*   ANIONGAP 16* 16* 13   JHONATHAN 9.3 9.4 8.4*   * 119* 117*     Most Recent 2 LFT's:  Recent Labs   Lab Test 08/14/24  1135 06/23/24  1046   AST 29 27   ALT 28 27   ALKPHOS 99 50   BILITOTAL 0.3 0.4     Most Recent 3 BNP's:  Recent Labs   Lab Test 08/07/24  1448 06/27/24  1507   NTBNPI  --  25,311*   NTBNP 7,932*  --      Most Recent TSH and T4:  Recent Labs   Lab Test 08/14/24  1135   TSH 4.61*   T4 1.21     Most Recent Hemoglobin A1c:  Recent Labs   Lab Test 08/07/24  1448   A1C 6.8*         Assessment/Plan:    (I50.20) Heart failure with reduced ejection fraction (H)  (primary encounter diagnosis)  (I34.0) Nonrheumatic mitral valve regurgitation  (I48.0) Intermittent atrial fibrillation (H)  (I10) Benign essential hypertension  (Z95.0) S/P placement of cardiac pacemaker  Comment: Chronic CHF, CAD, pulmonary hypertension. Last Echo 7/30/24 found preserved EF. Hx severe mitral regurgitation, mild AVS. Hx paroxysmal complete heart block s/p dual chamber permament pacemaker placement on 5/10/21.   Hx R large pleural effusion, s/p thoracentesis on 6/28/24.   BP controlled. PTA chlorthalidone and benazepril dc'd due to ANDREA and hypotension.  Intermittent shortness of breath. Weight trending down, BLE lymphedema improving.   Plan:   - CXR AP&L dx shortness of breath   - Does not have a nebulizer machine and no hx asthma/COPD; discontinue albuterol nebulizer PRN  - Bumex 1mg every day started on 8/6/24  - Continue eliquis, coreg, simvastatin   - Daily weights  - Patient to follow-up with Dr. Kenney ([facility]) as directed   - Reviewed  treatment plan with home care RN. Discussed per family discussion with home OT, it was recommending to start lymph wraps; not recommended due to recent pleural effusion. RN plans to update home care OT.    (K59.01) Slow transit constipation  Comment: Controlled with intermittent LUQ pain, which resolves with repositioning.   Plan:  - Decrease fiber supplement 500mg every day on MWF dx bowel management  - Continue tylenol       Orders:  - Discontinue robaxin PRN  - Discontinue albuterol nebulizer PRN  - Decrease fiber supplement 500mg every day on MWF dx bowel management  - CXR AP&L dx shortness of breath         Total time spent during today's visit was 76 mins including patient visit (9900-6412, 56 minutes) and review of past records (15 minutes). Time spent coordinating care with home care RN regarding plan for lymphedema therapy, not recommending lymph wraps at this time due to recurrent pleural effusion (5 minutes).    Electronically signed by: LYN Arellano CNP

## 2024-08-21 ENCOUNTER — ASSISTED LIVING VISIT (OUTPATIENT)
Dept: GERIATRICS | Facility: CLINIC | Age: 89
End: 2024-08-21
Payer: COMMERCIAL

## 2024-08-21 ENCOUNTER — TRANSFERRED RECORDS (OUTPATIENT)
Dept: HEALTH INFORMATION MANAGEMENT | Facility: CLINIC | Age: 89
End: 2024-08-21

## 2024-08-21 DIAGNOSIS — Z95.0 S/P PLACEMENT OF CARDIAC PACEMAKER: ICD-10-CM

## 2024-08-21 DIAGNOSIS — I10 BENIGN ESSENTIAL HYPERTENSION: ICD-10-CM

## 2024-08-21 DIAGNOSIS — K59.01 SLOW TRANSIT CONSTIPATION: ICD-10-CM

## 2024-08-21 DIAGNOSIS — I34.0 NONRHEUMATIC MITRAL VALVE REGURGITATION: ICD-10-CM

## 2024-08-21 DIAGNOSIS — I48.0 INTERMITTENT ATRIAL FIBRILLATION (H): ICD-10-CM

## 2024-08-21 DIAGNOSIS — I50.9 CONGESTIVE HEART FAILURE, UNSPECIFIED HF CHRONICITY, UNSPECIFIED HEART FAILURE TYPE (H): Primary | ICD-10-CM

## 2024-08-21 LAB — NT-PROBNP SERPL-MCNC: ABNORMAL PG/ML (ref 0–1800)

## 2024-08-21 PROCEDURE — 36415 COLL VENOUS BLD VENIPUNCTURE: CPT | Mod: ORL | Performed by: INTERNAL MEDICINE

## 2024-08-21 PROCEDURE — P9604 ONE-WAY ALLOW PRORATED TRIP: HCPCS | Mod: ORL | Performed by: INTERNAL MEDICINE

## 2024-08-21 PROCEDURE — 99350 HOME/RES VST EST HIGH MDM 60: CPT | Performed by: NURSE PRACTITIONER

## 2024-08-21 PROCEDURE — 83880 ASSAY OF NATRIURETIC PEPTIDE: CPT | Mod: ORL | Performed by: INTERNAL MEDICINE

## 2024-08-21 RX ORDER — DAPAGLIFLOZIN 10 MG/1
10 TABLET, FILM COATED ORAL DAILY
COMMUNITY
Start: 2024-08-09

## 2024-08-21 NOTE — PROGRESS NOTES
[facility] Geriatrics   2024     Name: Lisa Meraz   : 1934       Orders:  - Discontinue robaxin PRN  - Discontinue albuterol nebulizer PRN  - Decrease fiber supplement 500mg every day on MWF dx bowel management  - CXR AP&L dx shortness of breath         Electronically signed by  LYN Arellano CNP on 2024 at 12:09 PM       

## 2024-08-22 ENCOUNTER — DOCUMENTATION ONLY (OUTPATIENT)
Dept: GERIATRICS | Facility: CLINIC | Age: 89
End: 2024-08-22
Payer: COMMERCIAL

## 2024-08-22 NOTE — PROGRESS NOTES
[facility] TELEPHONE ENCOUNTER    No chief complaint on file.      Lisa Meraz is a 89 year old  (4/11/1934)      CXR 8/21/24:     ASSESSMENT/PLAN    Reviewed patient status and treatment plan with Regina (daughter).   Plan to review treatment plan with Cardiology team.   Next visit: 9/11/24 at 11AM      Electronically signed by:   LYN Arellano CNP

## 2024-08-26 ENCOUNTER — TELEPHONE (OUTPATIENT)
Dept: GERIATRICS | Facility: CLINIC | Age: 89
End: 2024-08-26
Payer: COMMERCIAL

## 2024-08-26 NOTE — TELEPHONE ENCOUNTER
"[facility] Triage Nurse Telephone Encounter    Provider: LYN Scott CNP   Facility: [facility]  Facility Type:  AL    Caller: Valentine   Call Back Number: [phone]    Allergies:  No Known Allergies     Reason for call:   Nursing is calling to report that the patient is having a little more trouble swallowing her medications and are requesting for medications to be crushed.  The only 1 that cannot be opened or crushed would be Farxiga.     Patient is noted to have increased left lower extremity weeping that started over the weekend.  Patient does have bilateral 2+ lower extremity edema that looks more \"puffy\" per nursing..   Patient currently Bumex 1 mg daily, lung sounds clear.  No cough or shortness of breath.  Patient weights are down.  Today 122-going back daily, 128, 124, 127.8, 130.2, 131.4.  Patient's appetite has been poor as well.Vitals: BP:  132/85  P:: 64 R:: 18 SPO2: 93% R/A Temp.: 98.6.  Nursing currently wrapping lower extremities with ACE wraps and ABD for the drainage.         Verbal Order/Direction given by Provider: OK to crush appropriate medications. Call and update cardiology with LE swelling and wts.     Provider giving Order:  LYN Scott CNP     Verbal Order given to: Valentine Fountain RN      "

## 2024-08-29 ENCOUNTER — HOSPITAL ENCOUNTER (OUTPATIENT)
Dept: WOUND CARE | Facility: CLINIC | Age: 89
Discharge: HOME | End: 2024-08-29
Payer: COMMERCIAL

## 2024-08-29 VITALS — TEMPERATURE: 97 F | HEART RATE: 73 BPM | DIASTOLIC BLOOD PRESSURE: 90 MMHG | SYSTOLIC BLOOD PRESSURE: 171 MMHG

## 2024-08-29 DIAGNOSIS — L89.153 PRESSURE INJURY OF COCCYGEAL REGION, STAGE 3 (H): Primary | ICD-10-CM

## 2024-08-29 DIAGNOSIS — L89.150 UNSTAGEABLE PRESSURE ULCER OF SACRAL REGION (H): ICD-10-CM

## 2024-08-29 PROCEDURE — 99203 OFFICE O/P NEW LOW 30 MIN: CPT | Mod: 24

## 2024-08-29 PROCEDURE — 97602 WOUND(S) CARE NON-SELECTIVE: CPT

## 2024-08-29 NOTE — DISCHARGE INSTRUCTIONS
08/29/2024   Lisa Meraz   4/11/1934    A DME order was not completed because the supplies are ordered by home care or at a care facility    Dressing changes outside of clinic are being performed by  Assisted Living staff  Vincent Byron ZhouPeter) [phone] Fax: [phone]    Plan 08/29/2024   Try increasing your Glucerna intake to 2 a day or try Sherwin packets  Shift your weight at least every 15 minutes when you are in the chair to reduce pressure on your coccyx    Wound Dressing Change: Coccyx  - Wash your hands with soap and water before you begin your dressing change and prepare a clean surface for dressings.  -Cleanse with mild unscented soap and water (such as Cetaphil, Cerave or Dove)/or saline  -Apply a zinc barrier cream (such as Desitin) to the skin around the wound  -Primary dressing: Apply a small dab of Woun'Dres (a collagen gel) to the wound bed  -Secondary dressing: Cover with a 4x4 Mepilex dressing  Change every other day and as needed with soiling/saturation    A diet high in protein is important for wound healing, we recommend getting 90 grams of protein per day. Taking protein shakes or bars are a good way to get extra protein in your diet.     Good sources of protein:  Pork 26g per 3 oz  Whey protein powder - 24g per scoop (on average)  Greek yogurt - 23g per 8oz   Chicken or Turkey - 23g per 3oz  Fish - 20-25g per 3oz  Beef - 18-23g per 3oz  Tofu - 10g per 1/2 cup  Navy beans - 20g per cup  Cottage cheese - 14g per 1/2 cup   Lentils - 13g per 1/4 cup  Beef jerky 13g per 1oz  2% milk - 8g per cup  Peanut butter - 8g per 2 tablespoons  Eggs - 6g per egg  Mixed nuts - 6g per 2oz       Main Provider: Ilene Ellison NP August 29, 2024    Call us at [phone] if you have any questions about your wounds, if you have redness or swelling around your wound, have a fever of 101 degrees Fahrenheit or greater or if you have any other problems or concerns. We answer the phone Monday through  Friday 8 am to 4 pm, please leave a message as we check the voicemail frequently throughout the day. If you have a concern over the weekend, please leave a message and we will return your call Monday. If the need is urgent, go to the ER or urgent care.    If you had a positive experience please indicate that on your patient satisfaction survey form that [facility] will be sending you.    It was a pleasure meeting with you today.  Thank you for allowing me and my team the privilege of caring for you today.  YOU are the reason we are here, and I truly hope we provided you with the excellent service you deserve.  Please let us know if there is anything else we can do for you so that we can be sure you are leaving completely satisfied with your care experience.      If you have any billing related questions please call the [facility] Business office at [phone]. The clinic staff does not handle billing related matters.    If you are scheduled to have a follow up appointment, you will receive a reminder call the day before your visit. On the appointment day please arrive 15 minutes prior to your appointment time. If you are unable to keep that appointment, please call the clinic to cancel or reschedule. If you are more than 10 minutes late or greater for your scheduled appointment time, the clinic policy is that you may be asked to reschedule.

## 2024-08-29 NOTE — PROGRESS NOTES
[facility]    ASSESSMENT:   (L89.153) Pressure injury of coccygeal region, stage 3   Malnutrition    PLAN/DISCUSSION:   Wound care plan: Woundress and mepilex. Dressing will be performed by facility staff See bottom of note for detailed wound care and patient instructions  Dietary recommendations discussed, see AVS   Discussed protein supplementation-recommend Sherwin BID.   Offload while in chair with shifts, she sleeps on her side in bed.     HISTORY OF PRESENT ILLNESS:   Lisa Meraz is a 89 year old female with a history of recent hospitalization for fracture, respiratory failure. Hx includes CKDD, malnutrition, and current shortness of breath. During her hospitalization developed a sacral wound that has progressively improved since transitioning to TCU.Denies fevers or chills. Wound has serous drainage.   Patient Active Problem List   Diagnosis    Fall, initial encounter    Hip fracture, left, closed, initial encounter (H)    Closed nondisplaced intertrochanteric fracture of left femur, initial encounter (H)    Benign essential hypertension    Chronic kidney disease, stage 3a (H)    Complete atrioventricular block (H)    CKD (chronic kidney disease) stage 4, GFR 15-29 ml/min (H)    History of blood clots    History of total right hip arthroplasty    Hyperlipidemia LDL goal <70    Intermittent atrial fibrillation (H)    Mitral regurgitation    Non-rheumatic tricuspid valve insufficiency    Other osteoporosis without current pathological fracture    Primary osteoarthritis involving multiple joints    Diabetes mellitus due to underlying condition with stage 3 chronic kidney disease, with long-term current use of insulin (H)    Diabetes mellitus, type 2 (H)    Pressure injury of coccygeal region, stage 3 (H)     Current Outpatient Medications   Medication Sig Dispense Refill    acetaminophen (TYLENOL) 500 MG tablet Take 2 tablets (1,000 mg) by mouth 3 times daily 7:30am, 1pm, 9pm 540 tablet  3    apixaban ANTICOAGULANT (ELIQUIS) 2.5 MG tablet Take 1 tablet (2.5 mg) by mouth 2 times daily 180 tablet 3    bumetanide (BUMEX) 1 MG tablet Take 1 mg by mouth daily      calcium carbonate (OS-JHONATHAN) 500 MG tablet Take 1 tablet (500 mg) by mouth daily 30 tablet 11    carvedilol (COREG) 6.25 MG tablet Give 1 tablet two times daily with meals. Hold for SBP < 100 or HR < 60 180 tablet 3    cholecalciferol ( ULTRA STRENGTH) 50 MCG (2000 UT) CAPS Take 1 capsule by mouth daily 90 capsule 3    dapagliflozin (FARXIGA) 10 MG TABS tablet Take 10 mg by mouth daily.      glucose 40 % (400 mg/mL) gel Take 15-30 g by mouth every 15 minutes as needed for low blood sugar      levothyroxine (SYNTHROID/LEVOTHROID) 25 MCG tablet Take 25 mcg by mouth daily      metFORMIN (GLUCOPHAGE) 500 MG tablet Take 2 tablets (1,000 mg) by mouth 2 times daily (with meals) 360 tablet 3    methocarbamol (ROBAXIN) 500 MG tablet Take 0.5 tablets (250 mg) by mouth 3 times daily as needed for muscle spasms 20 tablet 0    methylcellulose (CITRUCEL) 500 MG TABS tablet Take 1 tablet (500 mg) by mouth three times a week. 60 tablet 11    multivitamin w/minerals (THERA-VIT-M) tablet Take 1 tablet by mouth daily 90 tablet 3    polyethylene glycol (MIRALAX) 17 GM/Dose powder Take 17 g by mouth daily as needed for constipation      simvastatin (ZOCOR) 10 MG tablet Take 1 tablet (10 mg) by mouth every evening 90 tablet 3    tamsulosin (FLOMAX) 0.4 MG capsule Take 1 capsule (0.4 mg) by mouth daily 90 capsule 3    traZODone (DESYREL) 50 MG tablet Take 0.5 tablets (25 mg) by mouth at bedtime 45 tablet 3     No current facility-administered medications for this encounter.       VITALS: BP (!) 171/90 (BP Location: Left arm, Patient Position: Chair, Cuff Size: Adult Small)   Pulse 73   Temp 97  F (36.1  C) (Temporal)      PHYSICAL EXAM:  GENERAL: Patient is alert and oriented and in no acute distress  INTEGUMENTARY:   Wound Coccyx Moisture damage;Skin tear  (Active)       Wound (used by OP [facility] only) 08/29/24 1436 coccyx pressure injury (Active)   Thickness/Stage Stage 3 08/29/24 1438   Base granulating 08/29/24 1438   Periwound macerated 08/29/24 1438   Periwound Temperature warm 08/29/24 1438   Periwound Skin Turgor soft 08/29/24 1438   Length (cm) 1.2 08/29/24 1438   Width (cm) 1 08/29/24 1438   Depth (cm) 0.6 08/29/24 1438   Wound (cm^2) 1.2 cm^2 08/29/24 1438   Wound Volume (cm^3) 0.72 cm^3 08/29/24 1438   Drainage Characteristics/Odor serosanguineous 08/29/24 1438   Drainage Amount moderate 08/29/24 1438   Care, Wound non-select wound debridement performed. 08/29/24 1438       Incision/Surgical Site 06/24/24 Anterior;Left Greater Trochanter (Active)       Incision/Surgical Site 06/24/24 Anterior;Left Thigh (Active)       Incision/Surgical Site 06/24/24 Anterior;Left Thigh (Active)           PROCEDURES: Mechanical debridement was performed with cleansing of the wound by the nursing staff    MDM: 30 minutes were spent on the date of the visit reviewing previous chart notes, evaluating patient and developing the treatment plan, this excludes any time spent on procedures    PATIENT INSTRUCTIONS      Further instructions from your care team         08/29/2024   Lisa Meraz   4/11/1934    A DME order was not completed because the supplies are ordered by home care or at a care facility    Dressing changes outside of clinic are being performed by  Assisted Living staff  Vincent Ledesma) [phone] Fax: [phone]    Plan 08/29/2024   Try increasing your Glucerna intake to 2 a day or try Sherwin packets  Shift your weight at least every 15 minutes when you are in the chair to reduce pressure on your coccyx    Wound Dressing Change: Coccyx  - Wash your hands with soap and water before you begin your dressing change and prepare a clean surface for dressings.  -Cleanse with mild unscented soap and water (such as Cetaphil, Cerave or Dove)/or saline  -Apply a  zinc barrier cream (such as Desitin) to the skin around the wound  -Primary dressing: Apply a small dab of Woun'Dres (a collagen gel) to the wound bed  -Secondary dressing: Cover with a 4x4 Mepilex dressing  Change every other day and as needed with soiling/saturation    A diet high in protein is important for wound healing, we recommend getting 90 grams of protein per day. Taking protein shakes or bars are a good way to get extra protein in your diet.     Good sources of protein:  Pork 26g per 3 oz  Whey protein powder - 24g per scoop (on average)  Greek yogurt - 23g per 8oz   Chicken or Turkey - 23g per 3oz  Fish - 20-25g per 3oz  Beef - 18-23g per 3oz  Tofu - 10g per 1/2 cup  Navy beans - 20g per cup  Cottage cheese - 14g per 1/2 cup   Lentils - 13g per 1/4 cup  Beef jerky 13g per 1oz  2% milk - 8g per cup  Peanut butter - 8g per 2 tablespoons  Eggs - 6g per egg  Mixed nuts - 6g per 2oz       Main Provider: Ilene Ellison NP August 29, 2024    Call us at [phone] if you have any questions about your wounds, if you have redness or swelling around your wound, have a fever of 101 degrees Fahrenheit or greater or if you have any other problems or concerns. We answer the phone Monday through Friday 8 am to 4 pm, please leave a message as we check the voicemail frequently throughout the day. If you have a concern over the weekend, please leave a message and we will return your call Monday. If the need is urgent, go to the ER or urgent care.    If you had a positive experience please indicate that on your patient satisfaction survey form that [facility] will be sending you.    It was a pleasure meeting with you today.  Thank you for allowing me and my team the privilege of caring for you today.  YOU are the reason we are here, and I truly hope we provided you with the excellent service you deserve.  Please let us know if there is anything else we can do for you so that we can be sure you are leaving completely  satisfied with your care experience.      If you have any billing related questions please call the [facility] Business office at [phone]. The clinic staff does not handle billing related matters.    If you are scheduled to have a follow up appointment, you will receive a reminder call the day before your visit. On the appointment day please arrive 15 minutes prior to your appointment time. If you are unable to keep that appointment, please call the clinic to cancel or reschedule. If you are more than 10 minutes late or greater for your scheduled appointment time, the clinic policy is that you may be asked to reschedule.          Electronically signed by Ilene Ellison CNP on August 29, 2024

## 2024-09-02 DIAGNOSIS — Z53.9 DIAGNOSIS NOT YET DEFINED: Primary | ICD-10-CM

## 2024-09-02 PROCEDURE — G0180 MD CERTIFICATION HHA PATIENT: HCPCS | Performed by: NURSE PRACTITIONER

## 2024-09-03 ENCOUNTER — LAB REQUISITION (OUTPATIENT)
Dept: LAB | Facility: CLINIC | Age: 89
End: 2024-09-03
Payer: COMMERCIAL

## 2024-09-03 ENCOUNTER — TELEPHONE (OUTPATIENT)
Dept: GERIATRICS | Facility: CLINIC | Age: 89
End: 2024-09-03
Payer: COMMERCIAL

## 2024-09-03 DIAGNOSIS — I50.9 HEART FAILURE, UNSPECIFIED (H): ICD-10-CM

## 2024-09-03 NOTE — TELEPHONE ENCOUNTER
[facility] Geriatric[facility] Triage Nurse Telephone Encounter    Provider: LYN Scott CNP   Facility: [facility]  Facility Type:  [place]    Caller: Ariel  Call Back Number: [phone]    Allergies:  No Known Allergies     Reason for call: Pt is very fatigued and weak. She isn't eating or sleeping well. She is also SOB today; lungs have minimal crackles in the base. Vitals: /94, P58, R 36, 96%RA. weight 121.3 lbs. she has wraps on her legs currently - 3+ pitting edema. Pt does not want to go to ED. Hospice has been discussed but not initiated yet.     Verbal Order/Direction given by Provider: BMP and BNP on 9/4/24. CXY; dx: CHF. Update cardiology on CHF symptoms. Provider will see pt on 9/4 to address further    Provider giving Order:  LYN Scott CNP     Verbal Order given to: Ariel Sweeney RN

## 2024-09-04 ENCOUNTER — HOSPITAL ENCOUNTER (INPATIENT)
Facility: CLINIC | Age: 89
LOS: 3 days | Discharge: HOME HEALTH | DRG: 187 | End: 2024-09-07
Attending: EMERGENCY MEDICINE | Admitting: INTERNAL MEDICINE
Payer: COMMERCIAL

## 2024-09-04 ENCOUNTER — ASSISTED LIVING VISIT (OUTPATIENT)
Dept: GERIATRICS | Facility: CLINIC | Age: 89
End: 2024-09-04
Payer: COMMERCIAL

## 2024-09-04 ENCOUNTER — APPOINTMENT (OUTPATIENT)
Dept: CT IMAGING | Facility: CLINIC | Age: 89
DRG: 187 | End: 2024-09-04
Attending: EMERGENCY MEDICINE
Payer: COMMERCIAL

## 2024-09-04 VITALS
RESPIRATION RATE: 18 BRPM | WEIGHT: 120 LBS | HEART RATE: 61 BPM | BODY MASS INDEX: 21.26 KG/M2 | HEIGHT: 63 IN | OXYGEN SATURATION: 96 % | DIASTOLIC BLOOD PRESSURE: 58 MMHG | SYSTOLIC BLOOD PRESSURE: 107 MMHG

## 2024-09-04 DIAGNOSIS — J98.6 ELEVATED HEMIDIAPHRAGM: ICD-10-CM

## 2024-09-04 DIAGNOSIS — R06.02 SOB (SHORTNESS OF BREATH): ICD-10-CM

## 2024-09-04 DIAGNOSIS — I50.31 ACUTE HEART FAILURE WITH PRESERVED EJECTION FRACTION (H): ICD-10-CM

## 2024-09-04 DIAGNOSIS — I48.0 INTERMITTENT ATRIAL FIBRILLATION (H): ICD-10-CM

## 2024-09-04 DIAGNOSIS — J90 PLEURAL EFFUSION ON RIGHT: ICD-10-CM

## 2024-09-04 DIAGNOSIS — J90 RECURRENT RIGHT PLEURAL EFFUSION: Primary | ICD-10-CM

## 2024-09-04 LAB
ANION GAP SERPL CALCULATED.3IONS-SCNC: 13 MMOL/L (ref 7–15)
BASOPHILS # BLD AUTO: 0.1 10E3/UL (ref 0–0.2)
BASOPHILS NFR BLD AUTO: 1 %
BUN SERPL-MCNC: 30.1 MG/DL (ref 8–23)
CALCIUM SERPL-MCNC: 9.4 MG/DL (ref 8.8–10.4)
CHLORIDE SERPL-SCNC: 88 MMOL/L (ref 98–107)
CREAT SERPL-MCNC: 1.09 MG/DL (ref 0.51–0.95)
EGFRCR SERPLBLD CKD-EPI 2021: 48 ML/MIN/1.73M2
EOSINOPHIL # BLD AUTO: 0.1 10E3/UL (ref 0–0.7)
EOSINOPHIL NFR BLD AUTO: 1 %
ERYTHROCYTE [DISTWIDTH] IN BLOOD BY AUTOMATED COUNT: 18.1 % (ref 10–15)
GLUCOSE SERPL-MCNC: 151 MG/DL (ref 70–99)
HCO3 SERPL-SCNC: 31 MMOL/L (ref 22–29)
HCT VFR BLD AUTO: 42.5 % (ref 35–47)
HGB BLD-MCNC: 13.4 G/DL (ref 11.7–15.7)
IMM GRANULOCYTES # BLD: 0 10E3/UL
IMM GRANULOCYTES NFR BLD: 0 %
LYMPHOCYTES # BLD AUTO: 0.9 10E3/UL (ref 0.8–5.3)
LYMPHOCYTES NFR BLD AUTO: 13 %
MCH RBC QN AUTO: 30.2 PG (ref 26.5–33)
MCHC RBC AUTO-ENTMCNC: 31.5 G/DL (ref 31.5–36.5)
MCV RBC AUTO: 96 FL (ref 78–100)
MONOCYTES # BLD AUTO: 0.8 10E3/UL (ref 0–1.3)
MONOCYTES NFR BLD AUTO: 12 %
NEUTROPHILS # BLD AUTO: 5 10E3/UL (ref 1.6–8.3)
NEUTROPHILS NFR BLD AUTO: 74 %
NRBC # BLD AUTO: 0 10E3/UL
NRBC BLD AUTO-RTO: 0 /100
NT-PROBNP SERPL-MCNC: 5664 PG/ML (ref 0–1800)
PLATELET # BLD AUTO: 183 10E3/UL (ref 150–450)
POTASSIUM SERPL-SCNC: 5 MMOL/L (ref 3.4–5.3)
RBC # BLD AUTO: 4.43 10E6/UL (ref 3.8–5.2)
SODIUM SERPL-SCNC: 132 MMOL/L (ref 135–145)
TROPONIN T SERPL HS-MCNC: 62 NG/L
WBC # BLD AUTO: 6.8 10E3/UL (ref 4–11)

## 2024-09-04 PROCEDURE — 99350 HOME/RES VST EST HIGH MDM 60: CPT | Performed by: NURSE PRACTITIONER

## 2024-09-04 PROCEDURE — 71250 CT THORAX DX C-: CPT

## 2024-09-04 PROCEDURE — 84484 ASSAY OF TROPONIN QUANT: CPT | Performed by: EMERGENCY MEDICINE

## 2024-09-04 PROCEDURE — 99417 PROLNG OP E/M EACH 15 MIN: CPT | Performed by: NURSE PRACTITIONER

## 2024-09-04 PROCEDURE — 80048 BASIC METABOLIC PNL TOTAL CA: CPT | Performed by: EMERGENCY MEDICINE

## 2024-09-04 PROCEDURE — 120N000001 HC R&B MED SURG/OB

## 2024-09-04 PROCEDURE — 99285 EMERGENCY DEPT VISIT HI MDM: CPT | Mod: 25

## 2024-09-04 PROCEDURE — 96374 THER/PROPH/DIAG INJ IV PUSH: CPT

## 2024-09-04 PROCEDURE — G0378 HOSPITAL OBSERVATION PER HR: HCPCS

## 2024-09-04 PROCEDURE — 36415 COLL VENOUS BLD VENIPUNCTURE: CPT | Performed by: EMERGENCY MEDICINE

## 2024-09-04 PROCEDURE — 85025 COMPLETE CBC W/AUTO DIFF WBC: CPT | Performed by: EMERGENCY MEDICINE

## 2024-09-04 PROCEDURE — 93005 ELECTROCARDIOGRAM TRACING: CPT

## 2024-09-04 PROCEDURE — 99222 1ST HOSP IP/OBS MODERATE 55: CPT | Performed by: INTERNAL MEDICINE

## 2024-09-04 PROCEDURE — 250N000011 HC RX IP 250 OP 636: Performed by: EMERGENCY MEDICINE

## 2024-09-04 PROCEDURE — 83880 ASSAY OF NATRIURETIC PEPTIDE: CPT | Performed by: EMERGENCY MEDICINE

## 2024-09-04 RX ORDER — BUMETANIDE 0.25 MG/ML
2 INJECTION INTRAMUSCULAR; INTRAVENOUS ONCE
Status: COMPLETED | OUTPATIENT
Start: 2024-09-04 | End: 2024-09-04

## 2024-09-04 RX ORDER — AMOXICILLIN 250 MG
2 CAPSULE ORAL DAILY PRN
COMMUNITY

## 2024-09-04 RX ORDER — LOPERAMIDE HCL 2 MG
2 CAPSULE ORAL 4 TIMES DAILY PRN
COMMUNITY

## 2024-09-04 RX ADMIN — BUMETANIDE 2 MG: 0.25 INJECTION INTRAMUSCULAR; INTRAVENOUS at 21:43

## 2024-09-04 ASSESSMENT — COLUMBIA-SUICIDE SEVERITY RATING SCALE - C-SSRS
1. IN THE PAST MONTH, HAVE YOU WISHED YOU WERE DEAD OR WISHED YOU COULD GO TO SLEEP AND NOT WAKE UP?: NO
2. HAVE YOU ACTUALLY HAD ANY THOUGHTS OF KILLING YOURSELF IN THE PAST MONTH?: NO
6. HAVE YOU EVER DONE ANYTHING, STARTED TO DO ANYTHING, OR PREPARED TO DO ANYTHING TO END YOUR LIFE?: NO

## 2024-09-04 ASSESSMENT — ACTIVITIES OF DAILY LIVING (ADL)
ADLS_ACUITY_SCORE: 39

## 2024-09-04 NOTE — LETTER
" 9/4/2024      Lisa Meraz  [facility]  [address] [address]        [facility] GERIATRICS    Chief Complaint   Patient presents with     RECHECK     HPI:  Lisa Meraz is a 89 year old  (4/11/1934), who is being seen today for an episodic care visit at: [facility]) [[id number]].       Today's concern is:     During exam, patient seen sitting in wheelchair with Mary (daughter) present. Reports increased weakness and fatigue over the past several weeks. Yesterday had episode of shortness of breath, improved as day went on. States fatigue and shortness of breath have been limiting activity. Feels better than yesterday, but continues to endorse weakness. Mary reports CXR completed and technician recently left. RA also reports patient had brief episode of shortness of breath this morning during morning medication pass. Patient reports decreased appetite. Not sleeping well at night, reports gets up at least once during the night and has difficult time falling back asleep due to feeling restless. Continues to have mild pain to L side of ribs that lasts a few seconds, decreasing in frequency to 1-2 times/day compared to a few weeks ago. Denies dizziness, headaches, syncope. Denies constipation, diarrhea.       Allergies, and PMH/PSH reviewed in EPIC today.    REVIEW OF SYSTEMS:  4 point ROS including Respiratory, CV, GI and , other than that noted in the HPI,  is negative      Objective:   /58   Pulse 61   Ht 1.6 m (5' 3\")   Wt 54.4 kg (120 lb)   BMI 21.26 kg/m    GENERAL APPEARANCE:  Alert, in no distress, appears healthy, oriented, cooperative  ENT:  Mouth and posterior oropharynx normal, moist mucous membranes, [place]  EYES:  EOM, conjunctivae, lids, pupils and irises normal, PERRL. +right eyelid increased droopiness from baseline (chronic condition reported by family).  RESP: Poor respiratory effort, lungs diminished throughout with increased diminished " sounds to RLL, no respiratory distress  CV:  regular rate and rhythm, no murmur, rub, or gallop; BLE lymphedema (improving)  ABDOMEN:  normal bowel sounds, soft, nontender, no guarding or rebound  M/S:   Gait and station abnormal, ambulates w/walker. Sitting in wheelchair.   SKIN:  Inspection of skin and subcutaneous tissue baseline, Palpation of skin and subcutaneous tissue baseline  NEURO:   Cranial nerves 2-12 are normal tested and grossly at patient's baseline, Examination of sensation by touch normal  PSYCH:  oriented X 3, affect and mood normal    Wt Readings from Last 4 Encounters:   09/04/24 54.4 kg (120 lb)   08/20/24 59.6 kg (131 lb 6.4 oz)   08/06/24 63.7 kg (140 lb 6.4 oz)   07/03/24 61 kg (134 lb 7.7 oz)       Recent labs in [facility] reviewed by me today.    Most Recent 3 CBC's:  Recent Labs   Lab Test 08/14/24  1135 07/05/24  0746 07/04/24  0719   WBC 6.3 18.4* 19.6*   HGB 11.3* 8.5* 8.8*   MCV 99 88 87    305 313     Most Recent 3 BMP's:  Recent Labs   Lab Test 08/14/24  1135 08/07/24  1448 08/01/24  1218    134* 135   POTASSIUM 4.2 4.8 5.1   CHLORIDE 93* 95* 99   CO2 26 23 23   BUN 29.1* 38.8* 38.2*   CR 1.01* 1.00* 0.97*   ANIONGAP 16* 16* 13   JHONATHAN 9.3 9.4 8.4*   * 119* 117*     Most Recent 3 BNP's:  Recent Labs   Lab Test 08/21/24  1439 08/07/24  1448 06/27/24  1507   NTBNPI  --   --  25,311*   NTBNP 13,520* 7,932*  --        Lab Results   Component Value Date    A1C 6.8 08/07/2024       CXR 8/21/24:      CXR 9/4/24:         Impression:    Assessment/Plan:    (J90) Recurrent right pleural effusion  (primary encounter diagnosis)  (J98.6) Elevated hemidiaphragm  (I50.31) Acute heart failure with preserved ejection fraction (H)  (I48.0) Intermittent atrial fibrillation (H)  Comment: Recurrent right pleural effusion with moderate elevation of right hemidiaphragm noted on CXR 9/4/24. Compared to CXR 8/21/24, elevation of right hemidiaphragm is increased and new onset of R pleural  effusion noted today. Hx R large pleural effusion, s/p thoracentesis on 6/28/24 (removed 800cc).   Acute CHF w/preserved EF. Last Echo 7/30/24 found preserved EF. Hx severe mitral regurgitation, mild AVS. Intermittent A fib. Hx paroxysmal complete heart block s/p dual chamber permament pacemaker placement on 5/10/21.   Plan:   - BMP, BNP pending today  - CXR completed today, results pending  - Continue bumex 1mg every day  - Continue eliquis, coreg, simvastatin   - Daily weights  - Patient to follow-up with Dr. Kenney ([facility]) as directed   - Plan to review CXR and labs prior to adjusting diuretics/develop treatment plan. Mary and patient agree with plan.     UPDATE:   - Reviewed CXR results. Noted increasing right hemidiaphragm elevation compared to CXR 8/21/24 with new mild-moderate R pleural effusion.   - Reviewed CXR results, patient status and treatment plan with Dr. Camejo. Recommending to be sent to ED for CT chest, further evaluation and closer monitoring  - Discussed CXR and recommendations to be evaluated in ED with Mary Meraz (daughter, [phone]), agrees with plan. States family plans to bring patient to [facility] ED later today.   - Called [facility] ED to give report regarding patient transfer          Total time spent during today's visit was 100 mins including patient visit  ([id number], 64 minutes) and review of past records (25 minutes). Time spent coordinating care with Dr. Camejo regarding review of recent CXR, patient status and recommendations to be sent to ED for further evaluation (4 minutes). Time spent reviewing CXR results and treatment recommendations to be evaluated in ED with Mary (daughter), 7 minutes.     Electronically signed by: LYN Arellano CNP           Sincerely,        LYN Arellano CNP

## 2024-09-04 NOTE — PROGRESS NOTES
"[facility] GERIATRICS    Chief Complaint   Patient presents with    RECHECK     HPI:  Lisa Meraz is a 89 year old  (4/11/1934), who is being seen today for an episodic care visit at: [facility]) [[id number]].       Today's concern is:     During exam, patient seen sitting in wheelchair with Mary (daughter) present. Reports increased weakness and fatigue over the past several weeks. Yesterday had episode of shortness of breath, improved as day went on. States fatigue and shortness of breath have been limiting activity. Feels better than yesterday, but continues to endorse weakness. Mary reports CXR completed and technician recently left. RA also reports patient had brief episode of shortness of breath this morning during morning medication pass. Patient reports decreased appetite. Not sleeping well at night, reports gets up at least once during the night and has difficult time falling back asleep due to feeling restless. Continues to have mild pain to L side of ribs that lasts a few seconds, decreasing in frequency to 1-2 times/day compared to a few weeks ago. Denies dizziness, headaches, syncope. Denies constipation, diarrhea.       Allergies, and PMH/PSH reviewed in EPIC today.    REVIEW OF SYSTEMS:  4 point ROS including Respiratory, CV, GI and , other than that noted in the HPI,  is negative      Objective:   /58   Pulse 61   Ht 1.6 m (5' 3\")   Wt 54.4 kg (120 lb)   BMI 21.26 kg/m    GENERAL APPEARANCE:  Alert, in no distress, appears healthy, oriented, cooperative  ENT:  Mouth and posterior oropharynx normal, moist mucous membranes, [place]  EYES:  EOM, conjunctivae, lids, pupils and irises normal, PERRL. +right eyelid increased droopiness from baseline (chronic condition reported by family).  RESP: Poor respiratory effort, lungs diminished throughout with increased diminished sounds to RLL, no respiratory distress  CV:  regular rate and rhythm, no murmur, rub, or gallop; BLE lymphedema " (improving)  ABDOMEN:  normal bowel sounds, soft, nontender, no guarding or rebound  M/S:   Gait and station abnormal, ambulates w/walker. Sitting in wheelchair.   SKIN:  Inspection of skin and subcutaneous tissue baseline, Palpation of skin and subcutaneous tissue baseline  NEURO:   Cranial nerves 2-12 are normal tested and grossly at patient's baseline, Examination of sensation by touch normal  PSYCH:  oriented X 3, affect and mood normal    Wt Readings from Last 4 Encounters:   09/04/24 54.4 kg (120 lb)   08/20/24 59.6 kg (131 lb 6.4 oz)   08/06/24 63.7 kg (140 lb 6.4 oz)   07/03/24 61 kg (134 lb 7.7 oz)       Recent labs in [facility] reviewed by me today.    Most Recent 3 CBC's:  Recent Labs   Lab Test 08/14/24  1135 07/05/24  0746 07/04/24  0719   WBC 6.3 18.4* 19.6*   HGB 11.3* 8.5* 8.8*   MCV 99 88 87    305 313     Most Recent 3 BMP's:  Recent Labs   Lab Test 08/14/24  1135 08/07/24  1448 08/01/24  1218    134* 135   POTASSIUM 4.2 4.8 5.1   CHLORIDE 93* 95* 99   CO2 26 23 23   BUN 29.1* 38.8* 38.2*   CR 1.01* 1.00* 0.97*   ANIONGAP 16* 16* 13   JHONATHAN 9.3 9.4 8.4*   * 119* 117*     Most Recent 3 BNP's:  Recent Labs   Lab Test 08/21/24  1439 08/07/24  1448 06/27/24  1507   NTBNPI  --   --  25,311*   NTBNP 13,520* 7,932*  --        Lab Results   Component Value Date    A1C 6.8 08/07/2024       CXR 8/21/24:      CXR 9/4/24:         Impression:    Assessment/Plan:    (J90) Recurrent right pleural effusion  (primary encounter diagnosis)  (J98.6) Elevated hemidiaphragm  (I50.31) Acute heart failure with preserved ejection fraction (H)  (I48.0) Intermittent atrial fibrillation (H)  Comment: Recurrent right pleural effusion with moderate elevation of right hemidiaphragm noted on CXR 9/4/24. Compared to CXR 8/21/24, elevation of right hemidiaphragm is increased and new onset of R pleural effusion noted today. Hx R large pleural effusion, s/p thoracentesis on 6/28/24 (removed 800cc).   Acute CHF  w/preserved EF. Last Echo 7/30/24 found preserved EF. Hx severe mitral regurgitation, mild AVS. Intermittent A fib. Hx paroxysmal complete heart block s/p dual chamber permament pacemaker placement on 5/10/21.   Plan:   - BMP, BNP pending today  - CXR completed today, results pending  - Continue bumex 1mg every day  - Continue eliquis, coreg, simvastatin   - Daily weights  - Patient to follow-up with Dr. Kenney ([facility]) as directed   - Plan to review CXR and labs prior to adjusting diuretics/develop treatment plan. Mary and patient agree with plan.     UPDATE:   - Reviewed CXR results. Noted increasing right hemidiaphragm elevation compared to CXR 8/21/24 with new mild-moderate R pleural effusion.   - Reviewed CXR results, patient status and treatment plan with Dr. Camejo. Recommending to be sent to ED for CT chest, further evaluation and closer monitoring  - Discussed CXR and recommendations to be evaluated in ED with Mary Meraz (daughter, [phone]), agrees with plan. States family plans to bring patient to [facility] ED later today.   - Called [facility] ED to give report regarding patient transfer          Total time spent during today's visit was 100 mins including patient visit  ([id number], 64 minutes) and review of past records (25 minutes). Time spent coordinating care with Dr. Camejo regarding review of recent CXR, patient status and recommendations to be sent to ED for further evaluation (4 minutes). Time spent reviewing CXR results and treatment recommendations to be evaluated in ED with Mary (daughter), 7 minutes.     Electronically signed by: LYN Arellano CNP

## 2024-09-05 ENCOUNTER — APPOINTMENT (OUTPATIENT)
Dept: ULTRASOUND IMAGING | Facility: CLINIC | Age: 89
DRG: 187 | End: 2024-09-05
Attending: INTERNAL MEDICINE
Payer: COMMERCIAL

## 2024-09-05 ENCOUNTER — APPOINTMENT (OUTPATIENT)
Dept: PHYSICAL THERAPY | Facility: CLINIC | Age: 89
DRG: 187 | End: 2024-09-05
Attending: INTERNAL MEDICINE
Payer: COMMERCIAL

## 2024-09-05 LAB
APPEARANCE FLD: CLEAR
ATRIAL RATE - MUSE: 375 BPM
CELL COUNT BODY FLUID SOURCE: NORMAL
COLOR FLD: YELLOW
DIASTOLIC BLOOD PRESSURE - MUSE: NORMAL MMHG
GLUCOSE BLDC GLUCOMTR-MCNC: 163 MG/DL (ref 70–99)
GLUCOSE BLDC GLUCOMTR-MCNC: 232 MG/DL (ref 70–99)
INTERPRETATION ECG - MUSE: NORMAL
LYMPHOCYTES NFR FLD MANUAL: 70 %
MONOS+MACROS NFR FLD MANUAL: 27 %
NEUTS BAND NFR FLD MANUAL: 3 %
P AXIS - MUSE: NORMAL DEGREES
PR INTERVAL - MUSE: NORMAL MS
QRS DURATION - MUSE: 140 MS
QT - MUSE: 476 MS
QTC - MUSE: 476 MS
R AXIS - MUSE: 70 DEGREES
SYSTOLIC BLOOD PRESSURE - MUSE: NORMAL MMHG
T AXIS - MUSE: 124 DEGREES
TROPONIN T SERPL HS-MCNC: 62 NG/L
VENTRICULAR RATE- MUSE: 60 BPM
WBC # FLD AUTO: 219 /UL

## 2024-09-05 PROCEDURE — 97161 PT EVAL LOW COMPLEX 20 MIN: CPT | Mod: GP

## 2024-09-05 PROCEDURE — 89050 BODY FLUID CELL COUNT: CPT | Performed by: INTERNAL MEDICINE

## 2024-09-05 PROCEDURE — 272N000706 US THORACENTESIS

## 2024-09-05 PROCEDURE — 84484 ASSAY OF TROPONIN QUANT: CPT | Performed by: INTERNAL MEDICINE

## 2024-09-05 PROCEDURE — 36415 COLL VENOUS BLD VENIPUNCTURE: CPT | Performed by: INTERNAL MEDICINE

## 2024-09-05 PROCEDURE — 250N000013 HC RX MED GY IP 250 OP 250 PS 637: Performed by: INTERNAL MEDICINE

## 2024-09-05 PROCEDURE — 120N000001 HC R&B MED SURG/OB

## 2024-09-05 PROCEDURE — G0463 HOSPITAL OUTPT CLINIC VISIT: HCPCS | Mod: 25

## 2024-09-05 PROCEDURE — 0W993ZZ DRAINAGE OF RIGHT PLEURAL CAVITY, PERCUTANEOUS APPROACH: ICD-10-PCS | Performed by: STUDENT IN AN ORGANIZED HEALTH CARE EDUCATION/TRAINING PROGRAM

## 2024-09-05 PROCEDURE — 97116 GAIT TRAINING THERAPY: CPT | Mod: GP

## 2024-09-05 PROCEDURE — 99232 SBSQ HOSP IP/OBS MODERATE 35: CPT | Performed by: INTERNAL MEDICINE

## 2024-09-05 PROCEDURE — G0378 HOSPITAL OBSERVATION PER HR: HCPCS

## 2024-09-05 PROCEDURE — 250N000009 HC RX 250: Performed by: STUDENT IN AN ORGANIZED HEALTH CARE EDUCATION/TRAINING PROGRAM

## 2024-09-05 RX ORDER — HYDRALAZINE HYDROCHLORIDE 10 MG/1
10 TABLET, FILM COATED ORAL EVERY 4 HOURS PRN
Status: DISCONTINUED | OUTPATIENT
Start: 2024-09-05 | End: 2024-09-07 | Stop reason: HOSPADM

## 2024-09-05 RX ORDER — BUMETANIDE 1 MG/1
1 TABLET ORAL DAILY
Status: DISCONTINUED | OUTPATIENT
Start: 2024-09-05 | End: 2024-09-07 | Stop reason: HOSPADM

## 2024-09-05 RX ORDER — LIDOCAINE 40 MG/G
CREAM TOPICAL
Status: DISCONTINUED | OUTPATIENT
Start: 2024-09-05 | End: 2024-09-07 | Stop reason: HOSPADM

## 2024-09-05 RX ORDER — TAMSULOSIN HYDROCHLORIDE 0.4 MG/1
0.4 CAPSULE ORAL DAILY
Status: DISCONTINUED | OUTPATIENT
Start: 2024-09-05 | End: 2024-09-07 | Stop reason: HOSPADM

## 2024-09-05 RX ORDER — ACETAMINOPHEN 650 MG/1
650 SUPPOSITORY RECTAL EVERY 4 HOURS PRN
Status: DISCONTINUED | OUTPATIENT
Start: 2024-09-05 | End: 2024-09-07 | Stop reason: HOSPADM

## 2024-09-05 RX ORDER — AMOXICILLIN 250 MG
2 CAPSULE ORAL 2 TIMES DAILY
Status: DISCONTINUED | OUTPATIENT
Start: 2024-09-05 | End: 2024-09-07 | Stop reason: HOSPADM

## 2024-09-05 RX ORDER — ONDANSETRON 2 MG/ML
4 INJECTION INTRAMUSCULAR; INTRAVENOUS EVERY 6 HOURS PRN
Status: DISCONTINUED | OUTPATIENT
Start: 2024-09-05 | End: 2024-09-07 | Stop reason: HOSPADM

## 2024-09-05 RX ORDER — SIMVASTATIN 10 MG
10 TABLET ORAL EVERY EVENING
Status: DISCONTINUED | OUTPATIENT
Start: 2024-09-05 | End: 2024-09-07 | Stop reason: HOSPADM

## 2024-09-05 RX ORDER — CALCIUM CARBONATE 500 MG/1
1000 TABLET, CHEWABLE ORAL 4 TIMES DAILY PRN
Status: DISCONTINUED | OUTPATIENT
Start: 2024-09-05 | End: 2024-09-07 | Stop reason: HOSPADM

## 2024-09-05 RX ORDER — LEVOTHYROXINE SODIUM 25 UG/1
25 TABLET ORAL DAILY
Status: DISCONTINUED | OUTPATIENT
Start: 2024-09-05 | End: 2024-09-07 | Stop reason: HOSPADM

## 2024-09-05 RX ORDER — HYDRALAZINE HYDROCHLORIDE 20 MG/ML
10 INJECTION INTRAMUSCULAR; INTRAVENOUS EVERY 4 HOURS PRN
Status: DISCONTINUED | OUTPATIENT
Start: 2024-09-05 | End: 2024-09-07 | Stop reason: HOSPADM

## 2024-09-05 RX ORDER — LIDOCAINE HYDROCHLORIDE 10 MG/ML
10 INJECTION, SOLUTION EPIDURAL; INFILTRATION; INTRACAUDAL; PERINEURAL ONCE
Status: COMPLETED | OUTPATIENT
Start: 2024-09-05 | End: 2024-09-05

## 2024-09-05 RX ORDER — ACETAMINOPHEN 325 MG/1
650 TABLET ORAL EVERY 4 HOURS PRN
Status: DISCONTINUED | OUTPATIENT
Start: 2024-09-05 | End: 2024-09-07 | Stop reason: HOSPADM

## 2024-09-05 RX ORDER — AMOXICILLIN 250 MG
2 CAPSULE ORAL 2 TIMES DAILY PRN
Status: DISCONTINUED | OUTPATIENT
Start: 2024-09-05 | End: 2024-09-07 | Stop reason: HOSPADM

## 2024-09-05 RX ORDER — AMOXICILLIN 250 MG
1 CAPSULE ORAL 2 TIMES DAILY PRN
Status: DISCONTINUED | OUTPATIENT
Start: 2024-09-05 | End: 2024-09-07 | Stop reason: HOSPADM

## 2024-09-05 RX ORDER — CARVEDILOL 6.25 MG/1
6.25 TABLET ORAL 2 TIMES DAILY WITH MEALS
Status: DISCONTINUED | OUTPATIENT
Start: 2024-09-05 | End: 2024-09-07 | Stop reason: HOSPADM

## 2024-09-05 RX ORDER — ACETAMINOPHEN 500 MG
1000 TABLET ORAL 3 TIMES DAILY
Status: DISCONTINUED | OUTPATIENT
Start: 2024-09-05 | End: 2024-09-07 | Stop reason: HOSPADM

## 2024-09-05 RX ORDER — AMOXICILLIN 250 MG
1 CAPSULE ORAL 2 TIMES DAILY
Status: DISCONTINUED | OUTPATIENT
Start: 2024-09-05 | End: 2024-09-07 | Stop reason: HOSPADM

## 2024-09-05 RX ORDER — ONDANSETRON 4 MG/1
4 TABLET, ORALLY DISINTEGRATING ORAL EVERY 6 HOURS PRN
Status: DISCONTINUED | OUTPATIENT
Start: 2024-09-05 | End: 2024-09-07 | Stop reason: HOSPADM

## 2024-09-05 RX ADMIN — TRAZODONE HYDROCHLORIDE 25 MG: 50 TABLET ORAL at 21:55

## 2024-09-05 RX ADMIN — BUMETANIDE 1 MG: 1 TABLET ORAL at 08:26

## 2024-09-05 RX ADMIN — SIMVASTATIN 10 MG: 10 TABLET, FILM COATED ORAL at 20:21

## 2024-09-05 RX ADMIN — LIDOCAINE HYDROCHLORIDE 6 ML: 10 INJECTION, SOLUTION EPIDURAL; INFILTRATION; INTRACAUDAL; PERINEURAL at 11:55

## 2024-09-05 RX ADMIN — ACETAMINOPHEN 1000 MG: 500 TABLET, FILM COATED ORAL at 21:55

## 2024-09-05 RX ADMIN — ACETAMINOPHEN 1000 MG: 500 TABLET, FILM COATED ORAL at 17:08

## 2024-09-05 RX ADMIN — TRAZODONE HYDROCHLORIDE 25 MG: 50 TABLET ORAL at 00:23

## 2024-09-05 RX ADMIN — CARVEDILOL 6.25 MG: 6.25 TABLET, FILM COATED ORAL at 08:26

## 2024-09-05 RX ADMIN — TAMSULOSIN HYDROCHLORIDE 0.4 MG: 0.4 CAPSULE ORAL at 08:26

## 2024-09-05 RX ADMIN — CARVEDILOL 6.25 MG: 6.25 TABLET, FILM COATED ORAL at 17:08

## 2024-09-05 RX ADMIN — LEVOTHYROXINE SODIUM 25 MCG: 25 TABLET ORAL at 08:26

## 2024-09-05 ASSESSMENT — ACTIVITIES OF DAILY LIVING (ADL)
ADLS_ACUITY_SCORE: 49
ADLS_ACUITY_SCORE: 44
ADLS_ACUITY_SCORE: 49
ADLS_ACUITY_SCORE: 49
ADLS_ACUITY_SCORE: 44
ADLS_ACUITY_SCORE: 49
ADLS_ACUITY_SCORE: 45
ADLS_ACUITY_SCORE: 49
ADLS_ACUITY_SCORE: 44
ADLS_ACUITY_SCORE: 49
ADLS_ACUITY_SCORE: 49
ADLS_ACUITY_SCORE: 44
ADLS_ACUITY_SCORE: 49
ADLS_ACUITY_SCORE: 45
ADLS_ACUITY_SCORE: 39

## 2024-09-05 NOTE — ED PROVIDER NOTES
Emergency Department Note      History of Present Illness     Chief Complaint   Shortness of Breath    YANDEL Meraz is a 89 year old female on [facility] with a history of stage 3 CKD, hypertension, hyperlipidemia, and type II diabetes presenting with shortness of breath. The patient has had intermittent episodes of shortness of breath for the past week. 8 days ago, she was short of breath for an hour. Yesterday, like usual, she was short of breath 2-3 times that day. The patient notes that she notices her uncomfortableness more when she is active. It does not change between daytime and nighttime and it feels the same during each episode. Today, when she woke up it was hard to breath, but the rest of the day was uneventful. The patient notes that she feels okay besides her shortness of breath. Today, the patient saw her geriatric physicians assistant, where her chest xray showed increased inflammation since 3 months ago. The patient's family notes that she is on a diarrheic for her bilateral leg edema, and is possibly dehydrated. Her usual weight of 108lbs changed to 135lbs a few months ago, but has decreased to 121 lbs today. Her swelling is slowly decreasing.     Independent Historian   The patient's family as above    Review of External Notes   I reviewed the clinic note from today  I reviewed the patient's echo from June 2024.     Past Medical History     Medical History and Problem List   Acute cystitis with hematuria  Anemia, unspecified   Complete atrioventricular block   CKD, stage 3  Benign essential hypertension   DM due to underlying condition with ketoacidosis with coma   Generalized edema  Hypo-osmolality and hyponatremia  Hypokalemia  Herpes zoster without mention of complication   History of blood clots   Intermittent atrial fibrillation   Intertrochanteric fracture of left femur, closed, with routine healing, subsequent encounter  Mixed hyperlipidemia  Mitral insufficiency and aortic  "stenosis   Non-pressure chronic ulcer of back with unspecified severity  Non-rheumatic tricuspid valve insufficiency   Non-rheumatic mitral valve regurgitation   Other acute postprocedural pain  Other osteoporosis without current pathological fracture   Other iron deficiency anemias  Osteopenia   PAF   Pleural effusion, not elsewhere classified  Psychophysiological insomnia  Primary osteoarthritis involving multiple joints   Retention of urine, unspecified  Type 2 diabetes mellitus with other specified complication   Unspecified severe protein-calorie malnutrition   Unspecified systolic (congestive) heart failure   Unsteadiness on feet    Medications   Apixaban   Amlodipine   Albuterol   Bumetanide   Benazepril   Carvedilol   Cholecalciferol   Chlorthalidone   Dapagliflozin   Levothyroxine   Lancets   Oxycodone   Metformin   Methylcellulose   Methocarbamol   Simvastatin   Tamsulosin   Trazodone     Surgical History   Appendectomy   Open reduction internal fixation hip nailing, left   Tonsillectomy   Total right hip arthroplasty   Physical Exam     Patient Vitals for the past 24 hrs:   BP Temp Temp src Pulse Resp SpO2 Height Weight   09/04/24 1909 (!) 150/65 97.7  F (36.5  C) Temporal 64 20 94 % 1.626 m (5' 4\") 54.9 kg (121 lb)     Physical Exam  Gen: well appearing, in no acute distress  Oral : Mucous membranes moist,   Nose: No rhinorhea  Ears: External near normal, without drainage  Eyes: periorbital tissues and sclera normal   Neck: supple, no abnormal swelling  Lungs: Clear bilaterally, no tachypnea or distress, speaks full sentences  CV: Regular rate, regular rhythm  Abd: soft, nontender, nondistended, no rebound/guarding  Ext: Significant lower extremity edema.  Skin: warm, dry, well perfused, no rashes/bruising/lesions on exposed skin  Neuro: alert, no gross motor or sensory deficits,   Psych: pleasant mood, normal affect    Diagnostics     Lab Results   Labs Ordered and Resulted from Time of ED Arrival to " Time of ED Departure   BASIC METABOLIC PANEL - Abnormal       Result Value    Sodium 132 (*)     Potassium 5.0      Chloride 88 (*)     Carbon Dioxide (CO2) 31 (*)     Anion Gap 13      Urea Nitrogen 30.1 (*)     Creatinine 1.09 (*)     GFR Estimate 48 (*)     Calcium 9.4      Glucose 151 (*)    NT PROBNP INPATIENT - Abnormal    N terminal Pro BNP Inpatient 5,664 (*)    TROPONIN T, HIGH SENSITIVITY - Abnormal    Troponin T, High Sensitivity 62 (*)    CBC WITH PLATELETS AND DIFFERENTIAL - Abnormal    WBC Count 6.8      RBC Count 4.43      Hemoglobin 13.4      Hematocrit 42.5      MCV 96      MCH 30.2      MCHC 31.5      RDW 18.1 (*)     Platelet Count 183      % Neutrophils 74      % Lymphocytes 13      % Monocytes 12      % Eosinophils 1      % Basophils 1      % Immature Granulocytes 0      NRBCs per 100 WBC 0      Absolute Neutrophils 5.0      Absolute Lymphocytes 0.9      Absolute Monocytes 0.8      Absolute Eosinophils 0.1      Absolute Basophils 0.1      Absolute Immature Granulocytes 0.0      Absolute NRBCs 0.0         Imaging   Chest CT w/o contrast   Final Result   IMPRESSION:    1.  Increasing, large right pleural effusion with associated right middle and lower lobe atelectasis.   2.  Cardiomegaly and small pericardial effusion, likely unchanged.             EKG   ECG results from 09/04/24   EKG 12 lead     Value    Systolic Blood Pressure     Diastolic Blood Pressure     Ventricular Rate 60    Atrial Rate 375    WI Interval     QRS Duration 140        QTc 476    P Axis     R AXIS 70    T Axis 124    Interpretation ECG      Ventricular-paced rhythm  Abnormal ECG  ECG interpreted by me at 2035.         Independent Interpretation   CT reviewed significant right-sided pleural effusion present    ED Course      Medications Administered   Medications   bumetanide (BUMEX) injection 2 mg (2 mg Intravenous $Given 9/4/24 7412)       Procedures   Procedures     Discussion of Management   Admitting Hospitalist,  Christopher    ED Course   ED Course as of 09/04/24 2205   Wed Sep 04, 2024   2023 I obtained the history and examined the patient as above.     2202 I spoke with Dr. White from the hospitalist service regarding the patient's presentation, findings here in the ED, and plan of care.         Additional Documentation  None    Medical Decision Making / Diagnosis     CMS Diagnoses: None    MIPS     None    MDM   Lisa Meraz is a 89 year old female presents emergency department recurrent shortness of breath.  She lives in a care facility they have noticed at times she is quite tachypneic and uncomfortable with her short of breath.  Tonight she looks more comfortable, not tachypneic, no significant hypoxia although O2 sats pretty consistently in the 91-92 range while at rest.  CT shows fairly significant right-sided pleural effusion.  She is anticoagulated, will not attempt any thoracentesis tonight in the ED.  I did order a dose of Bumex for her.  She is not in respiratory distress at this time.  Contacted hospitalist who is in agreement for inpatient management.    Disposition   The patient was admitted to the hospital.     Diagnosis     ICD-10-CM    1. SOB (shortness of breath)  R06.02       2. Pleural effusion on right  J90            Discharge Medications   New Prescriptions    No medications on file         Scribe Disclosure:  I, Phoenix Peterson, am serving as a scribe at 8:17 PM on 9/4/2024 to document services personally performed by Ian Kaur MD based on my observations and the provider's statements to me.        Ian Kaur MD  09/04/24 2206

## 2024-09-05 NOTE — PROGRESS NOTES
"   09/05/24 1400   Appointment Info   Signing Clinician's Name / Credentials (PT) Tracy Morin DPT   Living Environment   People in Home alone   Current Living Arrangements assisted living   Home Accessibility no concerns   Self-Care   Usual Activity Tolerance fair   Current Activity Tolerance fair   Equipment Currently Used at Home walker, rolling;grab bar, tub/shower;grab bar, toilet;shower chair;wheelchair, manual;raised toilet seat   Fall history within last six months yes   Number of times patient has fallen within last six months 1   Activity/Exercise/Self-Care Comment Pt receives assist of 1 for mobility with use of FWW. Takes WC rides down to meals. Has assist for all ADLs. Has been working with  PT and OT after hip fx this summer.   General Information   Onset of Illness/Injury or Date of Surgery 09/04/24   Referring Physician Adan White MD   Pertinent History of Current Problem (include personal factors and/or comorbidities that impact the POC) \"Lisa Meraz is a 89 year old female who presents with shortness of breath, fatigue and weakness.  She was hospitalized in June of this year after having a fall with a left femoral fracture.  This was surgically repaired.  At the time she was found to have a right-sided pleural effusion which was tapped with 800 cc coming out.  She also was noted to have reduced ejection fraction.  She does follow with cardiology through [facility].  She currently has an assisted living environment.  She has noted worsening shortness of breath, daughter states has been for about 3 weeks.  She is also been quite fatigued although she states this has been going on for a while.  She also notes poor sleep that she may be contributing.  Shortness of breath is not really exertional but seems like it comes on at any time.  She notes she tries to calm herself down and her breathing feels better.  She has no chest pain.  She notes she has had edema since her hospital " "stay.  Daughter states her weight has been up.  She denies fevers or chills.  She has no nausea or vomiting.  Of note while speaking she does appear dyspneic and is unable to fully complete a sentence without taking a breath.\"   Existing Precautions/Restrictions fall   Cognition   Affect/Mental Status (Cognition) confused   Orientation Status (Cognition) oriented to;person;place;disoriented to;time   Follows Commands (Cognition) WFL   Pain Assessment   Patient Currently in Pain No   Posture    Posture Forward head position;Protracted shoulders   Range of Motion (ROM)   Range of Motion ROM is WFL   Strength (Manual Muscle Testing)   Strength Comments decreased functional LE strength and activity tolerance   Bed Mobility   Comment, (Bed Mobility) CGA sup>sit   Transfers   Comment, (Transfers) Mary sit>stand to FWW   Gait/Stairs (Locomotion)   Comment, (Gait/Stairs) CGA for ambulation with FWW, fatigues quickly   Balance   Balance Comments falls risk, currently requires use of FWW and phys assist for safe upright mobility   Sensory Examination   Sensory Perception patient reports no sensory changes   Clinical Impression   Criteria for Skilled Therapeutic Intervention Yes, treatment indicated   PT Diagnosis (PT) impaired IND with functional mobility   Influenced by the following impairments impaired functional strength, balance, activity tolernace   Functional limitations due to impairments impaired bed mobility, transfers, ambulation   Clinical Presentation (PT Evaluation Complexity) stable   Clinical Presentation Rationale Based on current presentation, PMH, social support   Clinical Decision Making (Complexity) low complexity   Planned Therapy Interventions (PT) balance training;bed mobility training;gait training;patient/family education;strengthening;transfer training;progressive activity/exercise   Risk & Benefits of therapy have been explained evaluation/treatment results reviewed;care plan/treatment goals " reviewed;risks/benefits reviewed;current/potential barriers reviewed;participants voiced agreement with care plan;participants included;patient;son;daughter   PT Total Evaluation Time   PT Eval, Low Complexity Minutes (46184) 10   Physical Therapy Goals   PT Frequency 5x/week   PT Predicted Duration/Target Date for Goal Attainment 09/12/24   PT Goals Bed Mobility;Transfers;Gait   PT: Bed Mobility Supine to/from sit;Supervision/stand-by assist   PT: Transfers Supervision/stand-by assist;Sit to/from stand;Bed to/from chair;Assistive device   PT: Gait Minimal assist;Assistive device;Jaime walker;100 feet   Interventions   Interventions Quick Adds Gait Training;Therapeutic Activity   Therapeutic Activity   Therapeutic Activities: dynamic activities to improve functional performance Minutes (34392) 5   Treatment Detail/Skilled Intervention Following eval, pt completed further sit<>Stands with Mary to FWW. Pivot bed>chair with Mary. Sit>sup with Mary. MaxA of 2 to boost up in bed. Pt remained supine with alarm armed and needs in reach.   Gait Training   Gait Training Minutes (89915) 12   Symptoms Noted During/After Treatment (Gait Training) fatigue   Treatment Detail/Skilled Intervention Pt cued for gait with FWW, CGA-Mary, family member following with WC for seated rests. Cued for upright posture, pacing. Pt with asymmetrical step length, slight limp but denies pain. Takes seated rest breaks due to fatigue.   Distance in Feet 75 + 25'   PT Discharge Planning   PT Plan progress gait distance with chair follow, sit<>Stand ALEXA hernandez stnrength   PT Discharge Recommendation (DC Rec) home with assist;home with home care physical therapy   PT Rationale for DC Rec Pt slightly below baseline due to decreased activity tolerance. Currently Ax1 with FWW. Lives at [facility] where she has assist for all mobility and ADLs, WC rides to meals. Appropriate to return home wtih prior level of assist and continued HHPT.   PT Brief overview of  current status Ax1 FWW. Goals of therapy will be to address safe mobility and make recs for d/c to next level of care. Pt and RN will continue to follow all falls risk precautions as documented by RN staff while hospitalized.   Total Session Time   Timed Code Treatment Minutes 17   Total Session Time (sum of timed and untimed services) 27

## 2024-09-05 NOTE — UTILIZATION REVIEW
Admission Status; Secondary Review Determination       Under the authority of the Utilization Management Committee, the utilization review process indicated a secondary review on the above patient. The review outcome is based on review of the medical records, discussions with staff, and applying clinical experience noted on the date of the review.     (x) Inpatient Status Appropriate - This patient's medical care is consistent with medical management for inpatient care and reasonable inpatient medical practice.     RATIONALE FOR DETERMINATION    89-year-old female with a significant medical history of recurrent right pleural effusion, chronic HFrEF and HFpEF, non-obstructive CAD, pulmonary hypertension, severe mitral regurgitation, hypertension, and hyperlipidemia was admitted on September 4, 2024, due to worsening shortness of breath and fatigue. Notable findings include elevated BNP levels, previously at 25,000 and currently at 5,664, and a troponin level of 62. Her recent echocardiogram in June showed left ventricular hypertrophy with an ejection fraction of 35-40%. She also has chronic kidney disease stage III, mild hyponatremia with sodium at 132, paroxysmal atrial fibrillation, and diabetes type II. Current imaging shows a large right pleural effusion with right middle and lower lobe atelectasis. Treatment includes telemetry, possible thoracic surgery consultation, increased diuretics, and adjustments to her anticoagulation and diabetes management. She is on multiple medications including bumex, carvedilol, simvastatin, and apixaban.    The patient's clinical presentation and complex comorbidities including severe cardiopulmonary conditions necessitate inpatient admission due to the high risk of adverse outcomes if managed as an outpatient. The recurrent large pleural effusion coupled with her cardiac dysfunctions and compromised renal function significantly increase her risk for acute decompensation.  Inpatient care is essential for close monitoring, adjustment of medications, and potential surgical interventions that cannot be adequately provided in an outpatient setting.    The expected length of stay at the time of admission was more than 2 nights because of the severity of illness, intensity of service provided, and risk for adverse outcome. Inpatient admission is appropriate.         This document was produced using voice recognition software       The information on this document is developed by the utilization review team in order for the business office to ensure compliance. This only denotes the appropriateness of proper admission status and does not reflect the quality of care rendered.   The definitions of Inpatient Status and Observation Status used in making the determination above are those provided in the CMS Coverage Manual, Chapter 1 and Chapter 6, section 70.4.   Sincerely,   VAHID SHUKLA MD   System Medical Director   Utilization Management   [facility].

## 2024-09-05 NOTE — CONSULTS
"[facility]  WOC Nurse Inpatient Assessment     Consulted for: sacral wound    Summary: POA Stage 3 pressure injury to coccyx. Patient was most recently seen at the [facility] on 8/29/24.    Patient History (according to provider note(s):      \"89 year old female admitted on 9/4/2024. She presents with shortness of breath     R Pleural effusion, recurrent  Elevated troponin  Chronic HFrEF and HFpEF  Non-obstructive CAD  Pulmonary hypertension  Severe MR  HTN  HLD  [Needs rec- bumex 1 mg daily, carvedilol 6.25 mg BID, simvastatin 10 mg HS]  *thoracentesis 6/28/24 w/ 800 ml removed. BNP had been 25k at that time. Echo 6/2024 w/ LVH, EF 35-40%  *[place] Worsening weakness and fatigue over weeks. Also SOB. No CP, edema reportedly stable. Dtr states weight is up. In ED O2 sats low 90%s. Other VSS. BNP 5,664 (was 13,520 8/21). Troponin 62. CT chest w/ large R pleural effusion w/ RM and RLL atelectasis. EKG v-paced.   - telemetry   - repeat troponin x 1  - IR for US thoracentesis  - resume PTA meds, may need increase in diuretic given effusions, edema  - ? Consideration for thoracic surgery involvement  - daily weights     Hyponatremia  CKD III  Low Na mild, 132. Baseline Creatinine 1-1.1.   - monitor     Paroxysmal atrial fibrillation  Hx CHB s/p ppm 5/2021  [Needs rec- apixaban 2.5 mg daily, carvedilol]  - hold apixaban  - resume carvedilol     DM II  - hold metformin, farxiga  - BID and HS blood sugars     Coccyx wound  Being cared for outpatient by wound clinic.   - WOC consult     Anxiety  insomnia  - resume trazodone      Hypothyroidism  - resume levofloxacin      Urinary incontinence  - resume tamsulosin\"     Assessment:      Areas visualized during today's visit: Focused: and Sacrum/coccyx    Pressure Injury Location: Coccyx      Last photo: 9/6  Wound type: Pressure Injury     Pressure Injury Stage: 3, present on admission        Wound history/plan of care:   POA Stage 3 " pressure injury to coccyx, present for approximately 2 months and appears to be healing well.12 x 11 cm of slowly blanchable redness noted to periwound after patient was sitting up eating lunch. This redness began to resolve once patient was laying on her side. Patient normally sleeps on her side and we discussed the importance of pressure relief.    Wound base: Moist, Pink, Smooth , and Pale     Palpation of the wound bed: normal      Drainage: small     Description of drainage: serous     Measurements (length x width x depth, in cm) 0.9  x 0.4  x  0.4 cm      Tunneling N/A     Undermining N/A  Periwound skin: Intact and Erythema- blanchable      Color: normal and consistent with surrounding tissue      Temperature: normal   Odor: none  Pain: denies , no grimacing or signs of discomfort, and during dressing change  Pain intervention prior to dressing change: patient tolerated well and slow and gentle cares   Treatment goal: Heal , Maintain (prevention of deterioration), and Protection  STATUS: initial assessment  Supplies ordered: gathered, at bedside, supplies stored on unit, discussed with RN, and discussed with patient     My PI Risk Assessment     Sensory Perception: 3 - Slightly Limited     Moisture: 4 - Rarely moist     Activity: 2 - Chairfast     Mobility: 3 - Slightly limited      Nutrition: 3 - Adequate     Friction/Shear: 1 - Problem     TOTAL: 16       Treatment Plan:     Coccyx wound(s): Every other day  Cleanse wound with Vashe and pat dry with gauze  Apply Cavilon No-sting barrier to periwound  Place a single layer of Temitope #[id number] (collagen) into the wound bed and moisten with a couple drops of saline   Cover with Sacral Mepilex bordered foam dressing  Follow PIP plan    Pressure Injury Prevention (PIP) Plan:  If patient is declining pressure injury prevention interventions: Explore reason why and address patient's concerns, Educate on pressure injury risk and prevention intervention(s), and If  "patient is still declining, document \"informed refusal\"   HOB: Maintain at or below 30 degrees, unless contraindicated  Repositioning in bed: Every 1-2 hours , Left/right positioning; avoid supine, and Raise foot of bed prior to raising head of bed, to reduce patient sliding down (shear)  Heels: Keep elevated off mattress and Pillows under calves  Protective Dressing: Sacral Mepilex   Positioning Equipment:None  Chair positioning: Repositions self: patient to shift weight every 15 minutes and Do NOT use a donut for sitting (this increases pressure to smaller area and creates a higher potential for injury)    If patient has a buttock pressure injury, or high risk for PI use chair cushion or SPS.  Moisture Management: Perineal cleansing /protection: Follow Incontinence Protocol, Avoid brief in bed, Clean and dry skin folds with bathing , and Moisturize dry skin  Under Devices: Inspect skin under all medical devices during skin inspection , Ensure tubes are stabilized without tension, and Ensure patient is not lying on medical devices or equipment when repositioned  Ask provider to discontinue device when no longer needed.      Orders: Written    RECOMMEND PRIMARY TEAM ORDER: None, at this time  Education provided: importance of repositioning, plan of care, wound progress, and Off-loading pressure  Discussed plan of care with: Patient, Family, and Nurse  WOC nurse follow-up plan: weekly  Notify WOC if wound(s) deteriorate.  Nursing to notify the Provider(s) and re-consult the WOC Nurse if new skin concern.    DATA:     Current support surface: Standard  Standard gel mattress (Isoflex)  Containment of urine/stool: Continent of bladder and Continent of bowel  BMI: Body mass index is 20.24 kg/m .   Active diet order: Orders Placed This Encounter      Combination Diet Regular Diet Adult; 2 gm NA Diet     Output: I/O last 3 completed shifts:  In: -   Out: 900 [Urine:900]     Labs:   Recent Labs   Lab 09/04/24  2030   HGB " 13.4   WBC 6.8     Pressure injury risk assessment:   Sensory Perception: 3-->slightly limited  Moisture: 4-->rarely moist  Activity: 2-->chairfast  Mobility: 3-->slightly limited  Nutrition: 3-->adequate  Friction and Shear: 2-->potential problem  Colin Score: 17    Valentine Campos RN CWCN  Pager no longer is use, please contact through [employer] group: WO Nurse Arturo

## 2024-09-05 NOTE — PROGRESS NOTES
[facility]    Medicine Progress Note - Hospitalist Service    Date of Admission:  9/4/2024    Assessment & Plan   Lisa Meraz is a 89 year old female admitted on 9/4/2024. She presents with shortness of breath     R Pleural effusion, recurrent  Elevated troponin  Chronic HFrEF and HFpEF  Non-obstructive CAD  Pulmonary hypertension  Severe MR  HTN  HLD  [Needs rec- bumex 1 mg daily, carvedilol 6.25 mg BID, simvastatin 10 mg HS]  *thoracentesis 6/28/24 w/ 800 ml removed. BNP had been 25k at that time. Echo 6/2024 w/ LVH, EF 35-40%  *[place]. Worsening weakness and fatigue over weeks. Also SOB. No CP, edema reportedly stable. Dtr states weight is up. In ED O2 sats low 90%s. Other VSS. BNP 5,664 (was 13,520 8/21). Troponin 62. CT chest w/ large R pleural effusion w/ RM and RLL atelectasis. EKG v-paced.   - discontinue telemetry  - repeat troponin are flat  - IR for US thoracentesis and she had 800 ml, further removal stopped as she started coughing, plan to repeat tomorrow 9/6  - resume PTA meds except diuresis in setting of ongoing thoracentesis  - Consideration for thoracic surgery involvement, given her advanced age not sure if   - daily weights, goal 106 - 108     Hyponatremia  CKD III  Low Na mild, 132. Baseline Creatinine 1-1.1.   - monitor     Paroxysmal atrial fibrillation  Hx CHB s/p ppm 5/2021  [Needs rec- apixaban 2.5 mg daily, carvedilol]  - hold apixaban  - resume carvedilol     DM II  - hold metformin, farxiga  - BID and HS blood sugars     Coccyx wound  Being cared for outpatient by wound clinic.   - WOC consult     Anxiety  insomnia  - resume trazodone      Hypothyroidism  - continue levothyroxine     Urinary incontinence  - resume tamsulosin     Diet:  regular low Na  DVT Prophylaxis: DOAC  Tiwari Catheter: Not present  Lines: None     Cardiac Monitoring: None  Code Status:  DNR/DNI        Clinically Significant Risk Factors Present on Admission               # Drug  Induced Coagulation Defect: home medication list includes an anticoagulant medication    # Hypertension: Noted on problem list  # Acute heart failure with reduced ejection fraction: last echo with EF <40% and receiving IV diuretics         # DMII: A1C = 6.8 % (Ref range: <5.7 %) within past 6 months                               Disposition Plan     Medically Ready for Discharge:  Diet: Combination Diet Regular Diet Adult; 2 gm NA Diet    DVT Prophylaxis: DOAC  Tiwari Catheter: Not present  Lines: None     Cardiac Monitoring: ACTIVE order. Indication: Acute decompensated heart failure (48 hours)  Code Status: No CPR- Do NOT Intubate      Clinically Significant Risk Factors Present on Admission     # Drug Induced Coagulation Defect: home medication list includes an anticoagulant medication    # Hypertension: Noted on problem list  # Acute heart failure with reduced ejection fraction: last echo with EF <40% and receiving IV diuretics       # DMII: A1C = 6.8 % (Ref range: <5.7 %) within past 6 months                     Disposition Plan     Medically Ready for Discharge: Anticipated Tomorrow             Ian Montano MD  Hospitalist Service  [facility]  Securely message with [employer] (more info)  Text page via [employer] Paging/Directory   ______________________________________________________________________    Interval History   -- chart reviewed  -- discussed with family and RN  -- had 800 ml, pain and sob resolved    Physical Exam   Vital Signs: Temp: 97.3  F (36.3  C) Temp src: Oral BP: (!) 146/6 Pulse: 65   Resp: 21 SpO2: 94 % O2 Device: None (Room air)    Weight: 117 lbs 14.4 oz    General Appearance: NAD  Respiratory: CTA, slightly diminished right base  Cardiovascular: RRR +M  GI: soft   Skin: warm   Other: Non focal     Medical Decision Making       45 MINUTES SPENT BY ME on the date of service doing chart review, history, exam, documentation & further activities per the note.      Data      I have personally reviewed the following data over the past 24 hrs:    6.8  \   13.4   / 183     132 (L) 88 (L) 30.1 (H) /  163 (H)   5.0 31 (H) 1.09 (H) \     Trop: 62 (H) BNP: 5,664 (H)       Imaging results reviewed over the past 24 hrs:   Recent Results (from the past 24 hour(s))   Chest CT w/o contrast    Narrative    EXAM: CT CHEST W/O CONTRAST  LOCATION: [facility]  DATE: 9/4/2024    INDICATION: sob, eval for worsening pleural effusion  COMPARISON: Chest x-ray dated 7/4/2024, chest CT dated 6/27/2024  TECHNIQUE: CT chest without IV contrast. Multiplanar reformats were obtained. Dose reduction techniques were used.  CONTRAST: None.    FINDINGS:   LUNGS AND PLEURA: Large right pleural effusion, slightly increased in interval. Trace left pleural effusion, decreased. The majority of the right middle and lower lobe are atelectatic. Persistent intralobular septal thickening. Overall improved aeration   within the ventilated lungs.    MEDIASTINUM/AXILLAE: Stable cardiomegaly. Small pericardial effusion, likely unchanged. Cardiac pacemaker. Dense mitral valve annular calcification.    CORONARY ARTERY CALCIFICATION: Moderate.    UPPER ABDOMEN: No acute abnormalities    MUSCULOSKELETAL: No acute bony abnormalities.      Impression    IMPRESSION:   1.  Increasing, large right pleural effusion with associated right middle and lower lobe atelectasis.  2.  Cardiomegaly and small pericardial effusion, likely unchanged.

## 2024-09-05 NOTE — PLAN OF CARE
"Goal Outcome Evaluation:    Patient Name: Nina  MRN: [id number]  Date of Admission: 9/4/2024  Reason for Admission: SOB, R pleural effusion  Level of Care: Med-Surg    Vitals:   BP Readings from Last 1 Encounters:   09/05/24 (!) 159/99     Pulse Readings from Last 1 Encounters:   09/05/24 65     Wt Readings from Last 1 Encounters:   09/05/24 53.5 kg (117 lb 14.4 oz)     Ht Readings from Last 1 Encounters:   09/04/24 1.626 m (5' 4\")     Estimated body mass index is 20.24 kg/m  as calculated from the following:    Height as of this encounter: 1.626 m (5' 4\").    Weight as of this encounter: 53.5 kg (117 lb 14.4 oz).  Temp Readings from Last 1 Encounters:   09/05/24 97.3  F (36.3  C) (Oral)       Pain: Pain goal 0 Pain Rating 0     CV Surgery Patient: No    Assessment    Resp: VSS on RA. LS diminished R side  Hydralazine for BSP > 180  Telemetry: 100% V-paced  Diet: Tolerating mod carb diet  -pills crushed in apple sauce  Neuro: A/O x 4, forgetful  GI/: AUOP using commode. No BM during shift, BS+  Skin/Wounds: PI to coccyx, wound cares completed, dressing CDI  +3 edema to BLE  Lines/Drains: PIV x 1 SL  Activity: A x 1 GB pivot to commode. PT  Sleep: good  Abnormal Labs: BG ACHS, trop 62, BnP 5,664    Aggression Stop Light: Green          Patient Care Plan: Repeat thoracentesis tomorrow 9/6 "

## 2024-09-05 NOTE — H&P
[facility]    History and Physical - Hospitalist Service       Date of Admission:  9/4/2024    Assessment & Plan      Lisa Meraz is a 89 year old female admitted on 9/4/2024. She presents with shortness of breath    R Pleural effusion, recurrent  Elevated troponin  Chronic HFrEF and HFpEF  Non-obstructive CAD  Pulmonary hypertension  Severe MR  HTN  HLD  [Needs rec- bumex 1 mg daily, carvedilol 6.25 mg BID, simvastatin 10 mg HS]  *thoracentesis 6/28/24 w/ 800 ml removed. BNP had been 25k at that time. Echo 6/2024 w/ LVH, EF 35-40%  *[place] in [place]. Worsening weakness and fatigue over weeks. Also SOB. No CP, edema reportedly stable. Dtr states weight is up. In ED O2 sats low 90%s. Other VSS. BNP 5,664 (was 13,520 8/21). Troponin 62. CT chest w/ large R pleural effusion w/ RM and RLL atelectasis. EKG v-paced.   - telemetry   - repeat troponin x 1  - IR for US thoracentesis  - resume PTA meds, may need increase in diuretic given effusions, edema  - ? Consideration for thoracic surgery involvement  - daily weights    Hyponatremia  CKD III  Low Na mild, 132. Baseline Creatinine 1-1.1.   - monitor    Paroxysmal atrial fibrillation  Hx CHB s/p ppm 5/2021  [Needs rec- apixaban 2.5 mg daily, carvedilol]  - hold apixaban  - resume carvedilol    DM II  - hold metformin, farxiga  - BID and HS blood sugars    Coccyx wound  Being cared for outpatient by wound clinic.   - WOC consult    Anxiety  insomnia  - resume trazodone     Hypothyroidism  - resume levofloxacin     Urinary incontinence  - resume tamsulosin          Diet:  regular low Na  DVT Prophylaxis: DOAC  Tiwari Catheter: Not present  Lines: None     Cardiac Monitoring: None  Code Status:  DNR/DNI    Clinically Significant Risk Factors Present on Admission               # Drug Induced Coagulation Defect: home medication list includes an anticoagulant medication    # Hypertension: Noted on problem list  # Acute heart failure with reduced  ejection fraction: last echo with EF <40% and receiving IV diuretics       # DMII: A1C = 6.8 % (Ref range: <5.7 %) within past 6 months                     Disposition Plan     Medically Ready for Discharge: Anticipated in 2-4 Days           Adan White MD  Hospitalist Service  [facility]  Securely message with [employer] (more info)  Text page via [facility] Paging/Directory     ______________________________________________________________________    Chief Complaint   Shortness of breath    History is obtained from the patient, electronic health record, and emergency department physician    History of Present Illness   Lisa Meraz is a 89 year old female who presents with shortness of breath, fatigue and weakness.  She was hospitalized in June of this year after having a fall with a left femoral fracture.  This was surgically repaired.  At the time she was found to have a right-sided pleural effusion which was tapped with 800 cc coming out.  She also was noted to have reduced ejection fraction.  She does follow with cardiology through [facility].  She currently has an assisted living environment.  She has noted worsening shortness of breath, daughter states has been for about 3 weeks.  She is also been quite fatigued although she states this has been going on for a while.  She also notes poor sleep that she may be contributing.  Shortness of breath is not really exertional but seems like it comes on at any time.  She notes she tries to calm herself down and her breathing feels better.  She has no chest pain.  She notes she has had edema since her hospital stay.  Daughter states her weight has been up.  She denies fevers or chills.  She has no nausea or vomiting.  Of note while speaking she does appear dyspneic and is unable to fully complete a sentence without taking a breath.      Past Medical History    Past Medical History:   Diagnosis Date    Acute cystitis with hematuria     DM due to  underlying condition with ketoacidosis with coma (H)     Generalized edema     Hypo-osmolality and hyponatremia     Hypokalemia     Intertrochanteric fracture of left femur, closed, with routine healing, subsequent encounter     Mixed hyperlipidemia     Non-pressure chronic ulcer of back with unspecified severity (H)     Other acute postprocedural pain     Other iron deficiency anemias     PAF (paroxysmal atrial fibrillation) (H)     Pleural effusion, not elsewhere classified     Psychophysiological insomnia     Retention of urine, unspecified     Type 2 diabetes mellitus with other specified complication (H)     Unspecified severe protein-calorie malnutrition (H24)     Unspecified systolic (congestive) heart failure (H)     Unsteadiness on feet        Past Surgical History   Past Surgical History:   Procedure Laterality Date    APPENDECTOMY      OPEN REDUCTION INTERNAL FIXATION HIP NAILING Left 06/24/2024    Procedure: Open reduction internal fixation of left intertrochanteric fracture with short intramedullary nail;  Surgeon: Richy Dunbar MD;  Location: [facility] OR    TONSILLECTOMY         Prior to Admission Medications   Prior to Admission Medications   Prescriptions Last Dose Informant Patient Reported? Taking?   acetaminophen (TYLENOL) 500 MG tablet   No No   Sig: Take 2 tablets (1,000 mg) by mouth 3 times daily 7:30am, 1pm, 9pm   apixaban ANTICOAGULANT (ELIQUIS) 2.5 MG tablet   No No   Sig: Take 1 tablet (2.5 mg) by mouth 2 times daily   bumetanide (BUMEX) 1 MG tablet   Yes No   Sig: Take 1 mg by mouth daily   calcium carbonate (OS-JHONATHAN) 500 MG tablet   No No   Sig: Take 1 tablet (500 mg) by mouth daily   carvedilol (COREG) 6.25 MG tablet   No No   Sig: Give 1 tablet two times daily with meals. Hold for SBP < 100 or HR < 60   cholecalciferol ( ULTRA STRENGTH) 50 MCG (2000 UT) CAPS   No No   Sig: Take 1 capsule by mouth daily   dapagliflozin (FARXIGA) 10 MG TABS tablet   Yes No   Sig: Take 10 mg by  mouth daily.   glucose 40 % (400 mg/mL) gel   No No   Sig: Take 15-30 g by mouth every 15 minutes as needed for low blood sugar   levothyroxine (SYNTHROID/LEVOTHROID) 25 MCG tablet   Yes No   Sig: Take 25 mcg by mouth daily   metFORMIN (GLUCOPHAGE) 500 MG tablet   No No   Sig: Take 2 tablets (1,000 mg) by mouth 2 times daily (with meals)   methocarbamol (ROBAXIN) 500 MG tablet   No No   Sig: Take 0.5 tablets (250 mg) by mouth 3 times daily as needed for muscle spasms   methylcellulose (CITRUCEL) 500 MG TABS tablet   No No   Sig: Take 1 tablet (500 mg) by mouth three times a week.   multivitamin w/minerals (THERA-VIT-M) tablet   No No   Sig: Take 1 tablet by mouth daily   polyethylene glycol (MIRALAX) 17 GM/Dose powder   No No   Sig: Take 17 g by mouth daily as needed for constipation   simvastatin (ZOCOR) 10 MG tablet   No No   Sig: Take 1 tablet (10 mg) by mouth every evening   tamsulosin (FLOMAX) 0.4 MG capsule   No No   Sig: Take 1 capsule (0.4 mg) by mouth daily   traZODone (DESYREL) 50 MG tablet   No No   Sig: Take 0.5 tablets (25 mg) by mouth at bedtime      Facility-Administered Medications: None        Review of Systems    The 10 point Review of Systems is negative other than noted in the HPI or here.     Social History   I have reviewed this patient's social history and updated it with pertinent information if needed.  Social History     Tobacco Use    Smoking status: Never    Smokeless tobacco: Never   Substance Use Topics    Alcohol use: Yes     Comment: May have a small glass of wine on special occasions.    Drug use: Never        Physical Exam   Vital Signs: Temp: 97.7  F (36.5  C) Temp src: Temporal BP: (!) 150/65 Pulse: 64   Resp: 20 SpO2: 94 %      Weight: 121 lbs 0 oz    General Appearance: Alert, very pleasant, no distress  Respiratory: diminished lower 1/3 R lung, otherwise clear  Cardiovascular: RRR with soft ADAM. 3+ symmetric edema  GI: soft, nt/nd  Skin: no rashes or lesions grossly     Other: CN grossly intact, METZGER      Medical Decision Making       70 MINUTES SPENT BY ME on the date of service doing chart review, history, exam, documentation & further activities per the note.      Data   ------------------------- PAST 24 HR DATA REVIEWED -----------------------------------------------    I have personally reviewed the following data over the past 24 hrs:    6.8  \   13.4   / 183     132 (L) 88 (L) 30.1 (H) /  151 (H)   5.0 31 (H) 1.09 (H) \     Trop: 62 (H) BNP: 5,664 (H)       Imaging results reviewed over the past 24 hrs:   Recent Results (from the past 24 hour(s))   Chest CT w/o contrast    Narrative    EXAM: CT CHEST W/O CONTRAST  LOCATION: [facility]  DATE: 9/4/2024    INDICATION: sob, eval for worsening pleural effusion  COMPARISON: Chest x-ray dated 7/4/2024, chest CT dated 6/27/2024  TECHNIQUE: CT chest without IV contrast. Multiplanar reformats were obtained. Dose reduction techniques were used.  CONTRAST: None.    FINDINGS:   LUNGS AND PLEURA: Large right pleural effusion, slightly increased in interval. Trace left pleural effusion, decreased. The majority of the right middle and lower lobe are atelectatic. Persistent intralobular septal thickening. Overall improved aeration   within the ventilated lungs.    MEDIASTINUM/AXILLAE: Stable cardiomegaly. Small pericardial effusion, likely unchanged. Cardiac pacemaker. Dense mitral valve annular calcification.    CORONARY ARTERY CALCIFICATION: Moderate.    UPPER ABDOMEN: No acute abnormalities    MUSCULOSKELETAL: No acute bony abnormalities.      Impression    IMPRESSION:   1.  Increasing, large right pleural effusion with associated right middle and lower lobe atelectasis.  2.  Cardiomegaly and small pericardial effusion, likely unchanged.

## 2024-09-05 NOTE — CONFIDENTIAL NOTE
[facility]   2024     Name: Lisa Meraz   : 1934       Orders:  - Referral to [facility] to eval/treat dx CHF, recurrent R pleural effusion, stage 3 sacral pressure ulcer  Please call Vignesh (son) to schedule appointment: [phone]      Electronically signed by   LYN Arellano CNP on 2024 at 4:13 PM

## 2024-09-05 NOTE — PLAN OF CARE
Goal Outcome Evaluation: No change   A and O x4. Up to BSC with 1 and GB, voiding large amounts.Tele= 100% V-paced. 3+ BLE edema toes to thighs. Had ace wraps on when arrived to unit, removed for the night per pt's daughter who was with her. Coccyx ulcer POA, covered with Mepilex, WOC RN  ordered to see for treatment. -170's, Hydralazine ordered for > 180.

## 2024-09-05 NOTE — PROGRESS NOTES
[facility]    Patient is currently receiving services with [facility]. The patient is currently receiving RN/PT/OT services.  Patient's  and home health team have been notified that patient is under inpatient status. [facility] Liaison will continue to follow patient during stay. Please provide orders to resume home care at time of discharge if appropriate.

## 2024-09-05 NOTE — ED TRIAGE NOTES
Pt reports increased SOB over the last day. Pt was seen in  and found to have a R pleural effusion. Pt was then referred to the ED.      Triage Assessment (Adult)       Row Name 09/04/24 1908          Triage Assessment    Airway WDL WDL        Respiratory WDL    Respiratory WDL X  SOB        Skin Circulation/Temperature WDL    Skin Circulation/Temperature WDL WDL        Cardiac WDL    Cardiac WDL WDL        Peripheral/Neurovascular WDL    Peripheral Neurovascular WDL WDL        Cognitive/Neuro/Behavioral WDL    Cognitive/Neuro/Behavioral WDL WDL                        Triage Assessment (Adult)       Row Name 09/04/24 1908          Triage Assessment    Airway WDL WDL        Respiratory WDL    Respiratory WDL X  SOB        Skin Circulation/Temperature WDL    Skin Circulation/Temperature WDL WDL        Cardiac WDL    Cardiac WDL WDL        Peripheral/Neurovascular WDL    Peripheral Neurovascular WDL WDL        Cognitive/Neuro/Behavioral WDL    Cognitive/Neuro/Behavioral WDL WDL

## 2024-09-05 NOTE — PHARMACY-ADMISSION MEDICATION HISTORY
Pharmacist Admission Medication History    Admission medication history is complete. The information provided in this note is only as accurate as the sources available at the time of the update.    Information Source(s): Family member, Facility (TCU/NH/) medication list/MAR, and CareEverywhere/SureScripts via in-person    Pertinent Information: Facility was unable to provide information on last dosing. Will follow up in AM for verification of medications. Patient's Daughter was able to provide a copy of a medication list.    Changes made to PTA medication list:  Added: Imodium, Senna-S  Deleted: Robaxin  Changed: None    Allergies reviewed with patient and updates made in EHR: yes    Medication History Completed By: Neil Moreno [facility] 9/4/2024 11:28 PM    PTA Med List   Medication Sig Last Dose    acetaminophen (TYLENOL) 500 MG tablet Take 2 tablets (1,000 mg) by mouth 3 times daily 7:30am, 1pm, 9pm 9/4/2024 at 1230    apixaban ANTICOAGULANT (ELIQUIS) 2.5 MG tablet Take 1 tablet (2.5 mg) by mouth 2 times daily 9/4/2024    bumetanide (BUMEX) 1 MG tablet Take 1 mg by mouth daily 9/4/2024    calcium carbonate (OS-JHONATHAN) 500 MG tablet Take 1 tablet (500 mg) by mouth daily 9/4/2024    carvedilol (COREG) 6.25 MG tablet Give 1 tablet two times daily with meals. Hold for SBP < 100 or HR < 60 9/4/2024    cholecalciferol ( ULTRA STRENGTH) 50 MCG (2000 UT) CAPS Take 1 capsule by mouth daily 9/4/2024    dapagliflozin (FARXIGA) 10 MG TABS tablet Take 10 mg by mouth daily. 9/4/2024    glucose 40 % (400 mg/mL) gel Take 15-30 g by mouth every 15 minutes as needed for low blood sugar Unknown    levothyroxine (SYNTHROID/LEVOTHROID) 25 MCG tablet Take 25 mcg by mouth daily 9/4/2024    loperamide (IMODIUM) 2 MG capsule Take 2 mg by mouth 4 times daily as needed for diarrhea. Unknown    metFORMIN (GLUCOPHAGE) 500 MG tablet Take 2 tablets (1,000 mg) by mouth 2 times daily (with meals) 9/4/2024    methylcellulose (CITRUCEL) 500 MG  TABS tablet Take 1 tablet (500 mg) by mouth three times a week. (Patient taking differently: Take 500 mg by mouth three times a week. M,W,F) 9/4/2024    multivitamin w/minerals (THERA-VIT-M) tablet Take 1 tablet by mouth daily 9/4/2024    polyethylene glycol (MIRALAX) 17 GM/Dose powder Take 17 g by mouth daily as needed for constipation Unknown    senna-docusate (SENOKOT-S/PERICOLACE) 8.6-50 MG tablet Take 2 tablets by mouth daily as needed for constipation. Unknown    simvastatin (ZOCOR) 10 MG tablet Take 1 tablet (10 mg) by mouth every evening 9/3/2024    tamsulosin (FLOMAX) 0.4 MG capsule Take 1 capsule (0.4 mg) by mouth daily 9/3/2024    traZODone (DESYREL) 50 MG tablet Take 0.5 tablets (25 mg) by mouth at bedtime 9/3/2024

## 2024-09-06 ENCOUNTER — APPOINTMENT (OUTPATIENT)
Dept: ULTRASOUND IMAGING | Facility: CLINIC | Age: 89
DRG: 187 | End: 2024-09-06
Attending: INTERNAL MEDICINE
Payer: COMMERCIAL

## 2024-09-06 LAB
ANION GAP SERPL CALCULATED.3IONS-SCNC: 8 MMOL/L (ref 7–15)
BUN SERPL-MCNC: 27.4 MG/DL (ref 8–23)
CALCIUM SERPL-MCNC: 9.2 MG/DL (ref 8.8–10.4)
CHLORIDE SERPL-SCNC: 91 MMOL/L (ref 98–107)
CREAT SERPL-MCNC: 1.04 MG/DL (ref 0.51–0.95)
EGFRCR SERPLBLD CKD-EPI 2021: 51 ML/MIN/1.73M2
GLUCOSE BLDC GLUCOMTR-MCNC: 180 MG/DL (ref 70–99)
GLUCOSE BLDC GLUCOMTR-MCNC: 204 MG/DL (ref 70–99)
GLUCOSE BLDC GLUCOMTR-MCNC: 244 MG/DL (ref 70–99)
GLUCOSE BLDC GLUCOMTR-MCNC: 349 MG/DL (ref 70–99)
GLUCOSE SERPL-MCNC: 178 MG/DL (ref 70–99)
HCO3 SERPL-SCNC: 37 MMOL/L (ref 22–29)
POTASSIUM SERPL-SCNC: 3.8 MMOL/L (ref 3.4–5.3)
SODIUM SERPL-SCNC: 136 MMOL/L (ref 135–145)

## 2024-09-06 PROCEDURE — 120N000001 HC R&B MED SURG/OB

## 2024-09-06 PROCEDURE — 0W993ZZ DRAINAGE OF RIGHT PLEURAL CAVITY, PERCUTANEOUS APPROACH: ICD-10-PCS | Performed by: RADIOLOGY

## 2024-09-06 PROCEDURE — 250N000009 HC RX 250: Performed by: RADIOLOGY

## 2024-09-06 PROCEDURE — 36415 COLL VENOUS BLD VENIPUNCTURE: CPT | Performed by: INTERNAL MEDICINE

## 2024-09-06 PROCEDURE — 272N000706 US THORACENTESIS

## 2024-09-06 PROCEDURE — 250N000013 HC RX MED GY IP 250 OP 250 PS 637: Performed by: INTERNAL MEDICINE

## 2024-09-06 PROCEDURE — 80048 BASIC METABOLIC PNL TOTAL CA: CPT | Performed by: INTERNAL MEDICINE

## 2024-09-06 PROCEDURE — 99232 SBSQ HOSP IP/OBS MODERATE 35: CPT | Performed by: INTERNAL MEDICINE

## 2024-09-06 PROCEDURE — G0378 HOSPITAL OBSERVATION PER HR: HCPCS

## 2024-09-06 RX ORDER — LIDOCAINE HYDROCHLORIDE 10 MG/ML
10 INJECTION, SOLUTION EPIDURAL; INFILTRATION; INTRACAUDAL; PERINEURAL ONCE
Status: COMPLETED | OUTPATIENT
Start: 2024-09-06 | End: 2024-09-06

## 2024-09-06 RX ADMIN — LEVOTHYROXINE SODIUM 25 MCG: 25 TABLET ORAL at 09:01

## 2024-09-06 RX ADMIN — LIDOCAINE HYDROCHLORIDE 10 ML: 10 INJECTION, SOLUTION EPIDURAL; INFILTRATION; INTRACAUDAL; PERINEURAL at 14:46

## 2024-09-06 RX ADMIN — ACETAMINOPHEN 1000 MG: 500 TABLET, FILM COATED ORAL at 09:00

## 2024-09-06 RX ADMIN — SENNOSIDES AND DOCUSATE SODIUM 1 TABLET: 50; 8.6 TABLET ORAL at 20:41

## 2024-09-06 RX ADMIN — SIMVASTATIN 10 MG: 10 TABLET, FILM COATED ORAL at 20:41

## 2024-09-06 RX ADMIN — CARVEDILOL 6.25 MG: 6.25 TABLET, FILM COATED ORAL at 09:01

## 2024-09-06 RX ADMIN — ACETAMINOPHEN 1000 MG: 500 TABLET, FILM COATED ORAL at 20:42

## 2024-09-06 RX ADMIN — TRAZODONE HYDROCHLORIDE 25 MG: 50 TABLET ORAL at 20:42

## 2024-09-06 RX ADMIN — ACETAMINOPHEN 1000 MG: 500 TABLET, FILM COATED ORAL at 17:29

## 2024-09-06 RX ADMIN — CARVEDILOL 6.25 MG: 6.25 TABLET, FILM COATED ORAL at 17:30

## 2024-09-06 RX ADMIN — TAMSULOSIN HYDROCHLORIDE 0.4 MG: 0.4 CAPSULE ORAL at 09:01

## 2024-09-06 ASSESSMENT — ACTIVITIES OF DAILY LIVING (ADL)
ADLS_ACUITY_SCORE: 49
ADLS_ACUITY_SCORE: 49
ADLS_ACUITY_SCORE: 34
ADLS_ACUITY_SCORE: 49
ADLS_ACUITY_SCORE: 34
ADLS_ACUITY_SCORE: 49
ADLS_ACUITY_SCORE: 49
ADLS_ACUITY_SCORE: 34
ADLS_ACUITY_SCORE: 34
DEPENDENT_IADLS:: CLEANING;COOKING;LAUNDRY;SHOPPING;MEAL PREPARATION;MEDICATION MANAGEMENT;MONEY MANAGEMENT;TRANSPORTATION
ADLS_ACUITY_SCORE: 49
ADLS_ACUITY_SCORE: 49
ADLS_ACUITY_SCORE: 34
ADLS_ACUITY_SCORE: 49
ADLS_ACUITY_SCORE: 34
ADLS_ACUITY_SCORE: 49
ADLS_ACUITY_SCORE: 34

## 2024-09-06 NOTE — PLAN OF CARE
Orientations: A&O x4, forgetful  Vitals/Pain: Denied pain.   Tele: 100% v paced.   Lines/Drains: PIV SL.   Skin/Wounds: coccyx wound, per plan of care.   GI/: continent of B&B. Last BM reported yesterday.   Labs: Abnormal/Trends, Electrolyte Replacement- Cr. 1.04  Ambulation/Assist: Ax1 with wk and GB. Ambulated in unit once with wheelchair following. C/o weakness.   Sleep Quality: na   Plan: Thoracentesis today, with 900 ml removed on right. Pt tolerated well. Will return to [facility] in am, no later than 1 pm. Son will transport. Any new meds will need to be filled at [facility]. RN to RN report will need to be done prior to discharge. [facility] RN # [phone].

## 2024-09-06 NOTE — PROGRESS NOTES
[facility]    Medicine Progress Note - Hospitalist Service    Date of Admission:  9/4/2024    Assessment & Plan   Lisa Meraz is a 89 year old female admitted on 9/4/2024. She presents with shortness of breath     R Pleural effusion, recurrent  Elevated troponin  Chronic HFrEF and HFpEF  Non-obstructive CAD  Pulmonary hypertension  Severe MR  HTN  HLD  [Needs rec- bumex 1 mg daily, carvedilol 6.25 mg BID, simvastatin 10 mg HS]  *thoracentesis 6/28/24 w/ 800 ml removed. BNP had been 25k at that time. Echo 6/2024 w/ LVH, EF 35-40%  *[place]. Worsening weakness and fatigue over weeks. Also SOB. No CP, edema reportedly stable. Dtr states weight is up. In ED O2 sats low 90%s. Other VSS. BNP 5,664 (was 13,520 8/21). Troponin 62. CT chest w/ large R pleural effusion w/ RM and RLL atelectasis. EKG v-paced.   - discontinue telemetry  - repeat troponin are flat  - IR for US thoracentesis and she had 800 ml on 9/5 and 100 ml on 9/6  - resume PTA meds except diuretics in setting of ongoing thoracentesis and resume at discharge  - Consideration for thoracic surgery involvement in the future but for now will arrange with PCP for monthly thoracentesis.  - daily weights, goal 106 - 108, her current weight is 111 lbs     Hyponatremia  CKD III  Low Na mild, 132. Baseline Creatinine 1-1.1.   - monitor     Paroxysmal atrial fibrillation  Hx CHB s/p ppm 5/2021  [Needs rec- apixaban 2.5 mg daily, carvedilol]  - hold apixaban  - resume carvedilol     DM II  - hold metformin, farxiga  - BID and HS blood sugars     Coccyx wound  Being cared for outpatient by wound clinic.   - WOC consult     Anxiety  insomnia  - resume trazodone      Hypothyroidism  - continue levothyroxine     Urinary incontinence  - resume tamsulosin     Diet:  regular low Na  DVT Prophylaxis: DOAC  Tiwari Catheter: Not present  Lines: None     Cardiac Monitoring: None  Code Status:  DNR/DNI        Clinically Significant Risk Factors  Present on Admission               # Drug Induced Coagulation Defect: home medication list includes an anticoagulant medication    # Hypertension: Noted on problem list  # Acute heart failure with reduced ejection fraction: last echo with EF <40% and receiving IV diuretics         # DMII: A1C = 6.8 % (Ref range: <5.7 %) within past 6 months               Disposition Plan     Medically Ready for Discharge:  Diet: Moderate Consistent Carb (60 g CHO per Meal) Diet    DVT Prophylaxis: DOAC  Tiwari Catheter: Not present  Lines: None     Cardiac Monitoring: ACTIVE order. Indication: Acute decompensated heart failure (48 hours)  Code Status: No CPR- Do NOT Intubate      Clinically Significant Risk Factors        # Hypertension: Noted on problem list  # Acute heart failure with reduced ejection fraction: last echo with EF <40% and receiving IV diuretics         # DMII: A1C = 6.8 % (Ref range: <5.7 %) within past 6 months, PRESENT ON ADMISSION      # Financial/Environmental Concerns: none          Disposition Plan     Medically Ready for Discharge:   -- her LTC will not take her today as she had a repeat thoracentesis, plan for discharge on 9/7/24 AM.             Ian Montano MD  Hospitalist Service  [facility]  Securely message with [employer] (more info)  Text page via [employer] Paging/Directory   ______________________________________________________________________    Interval History   -- chart reviewed  -- discussed with family and RN  -- had 800 ml, pain and sob resolved    Physical Exam   Vital Signs: Temp: 97.1  F (36.2  C) Temp src: Oral BP: 128/66 Pulse: 66   Resp: 20 SpO2: 95 % O2 Device: None (Room air)    Weight: 111 lbs 0 oz    General Appearance: NAD  Respiratory: CTA, slightly diminished right base  Cardiovascular: RRR +M  GI: soft   Skin: warm   Other: Non focal     Medical Decision Making       45 MINUTES SPENT BY ME on the date of service doing chart review, history, exam,  documentation & further activities per the note.      Data     I have personally reviewed the following data over the past 24 hrs:    N/A  \   N/A   / N/A     136 91 (L) 27.4 (H) /  204 (H)   3.8 37 (H) 1.04 (H) \       Imaging results reviewed over the past 24 hrs:   Recent Results (from the past 24 hour(s))   US Thoracentesis    Narrative    EXAM:  1. RIGHT THORACENTESIS  2. ULTRASOUND GUIDANCE  LOCATION: [facility]  DATE/TIME: 9/6/2024 3:01 PM    INDICATION: Pleural effusion.    PROCEDURE: Informed consent obtained. Time out performed. The chest  was prepped and draped in a sterile fashion. 10 mL of 1% lidocaine was  infused into local soft tissues. A 5 Sudanese catheter system was  introduced into the pleural effusion under ultrasound guidance.    0.9 liters of clear fluid were removed and sent to lab if requested.    Patient tolerated procedure well.    Ultrasound images have been permanently captured for documentation.      Impression    IMPRESSION:  Status post ultrasound-guided right thoracentesis.    SHEREEN WALDROP MD         SYSTEM ID:  [id number]

## 2024-09-06 NOTE — PLAN OF CARE
"Goal Outcome Evaluation:      Plan of Care Reviewed With: patient    Overall Patient Progress: improvingOverall Patient Progress: improving    Patient Name: Nina  MRN: [id number]  Date of Admission: 9/4/2024  Reason for Admission: SOB w pleural effusion  Level of Care: Med-surge     Vitals:   BP Readings from Last 1 Encounters:   09/06/24 (!) 157/79     Pulse Readings from Last 1 Encounters:   09/06/24 62     Wt Readings from Last 1 Encounters:   09/05/24 53.5 kg (117 lb 14.4 oz)     Ht Readings from Last 1 Encounters:   09/04/24 1.626 m (5' 4\")     Estimated body mass index is 20.24 kg/m  as calculated from the following:    Height as of this encounter: 1.626 m (5' 4\").    Weight as of this encounter: 53.5 kg (117 lb 14.4 oz).  Temp Readings from Last 1 Encounters:   09/06/24 97.4  F (36.3  C) (Oral)       Pain:Pt stated no pain   CV Surgery Patient: No    Assessment    Resp: RA  Telemetry:100% V paced  Neuro: Aox4, forgetful   GI/: Mod carb diet, No BM, BS+    Skin/Wounds: Edema +3 on Bilateral LE, Coccyx PI w WOC consulted   Lines/Drains: R PIV SL   Activity: Assist of 1   Sleep: Pt slept well     Aggression Stop Light: Green          Patient Care Plan: Thoracentesis 9-6, discharge    "

## 2024-09-06 NOTE — CONSULTS
Care Management Initial Consult    Lives: [facility] (Ph:[phone]) (fax:[phone])  Pharmacy: [facility]    General Information  Assessment completed with: Care Team Member, Ros Araujo, Nurse from [facility] and Armen Medellin and Daughter Ludmila Hernandez  Type of CM/SW Visit: Initial Assessment    Primary Care Provider verified and updated as needed: Yes   Readmission within the last 30 days: no previous admission in last 30 days      Reason for Consult: discharge planning  Advance Care Planning: Advance Care Planning Reviewed: present on chart  Theresa Tapia is primary HCA, currently on a cruise.  Armen Medellin is secondary HCA       Communication Assessment  Patient's communication style: spoken language (English or Bilingual)    Hearing Difficulty or Deaf: no   Wear Glasses or Blind: yes    Cognitive  Cognitive/Neuro/Behavioral: WDL  Level of Consciousness: alert  Arousal Level: opens eyes spontaneously  Orientation: oriented x 4  Mood/Behavior: calm, cooperative  Best Language: 0 - No aphasia  Speech: clear, spontaneous, logical    Living Environment:   People in home: facility resident     Current living Arrangements: assisted living  Name of Facility: [facility]   Able to return to prior arrangements: yes  Living Arrangement Comments: receives full services at the [facility]    Family/Social Support:  Care provided by: other (see comments), homecare agency  Provides care for: no one, unable/limited ability to care for self  Marital Status:   Support system: Children          Description of Support System: Involved    Support Assessment: Adequate family and caregiver support    Current Resources:   Patient receiving home care services: Yes  Skilled Home Care Services: Skilled Nursing, Physical Therapy, Occupational Therapy     Community Resources: Home Care  Equipment currently used at home: walker, rolling, grab bar, toilet, grab bar, tub/shower, glucometer, raised toilet seat, shower  chair, wheelchair, manual  Supplies currently used at home: Wound Care Supplies    Employment/Financial:  Employment Status: retired        Financial Concerns: none      Does the patient's insurance plan have a 3 day qualifying hospital stay waiver?  No    Lifestyle & Psychosocial Needs:  Social Determinants of Health     Food Insecurity: Low Risk  (9/6/2024)    Food Insecurity     Within the past 12 months, did you worry that your food would run out before you got money to buy more?: No     Within the past 12 months, did the food you bought just not last and you didn t have money to get more?: No   Depression: Not at risk (6/3/2024)    Received from [employer]    PHQ-2     PHQ-2 Score: 0   Housing Stability: Low Risk  (9/6/2024)    Housing Stability     Do you have housing? : Yes     Are you worried about losing your housing?: No   Tobacco Use: Low Risk  (9/4/2024)    Patient History     Smoking Tobacco Use: Never     Smokeless Tobacco Use: Never     Passive Exposure: Not on file   Financial Resource Strain: Low Risk  (9/6/2024)    Financial Resource Strain     Within the past 12 months, have you or your family members you live with been unable to get utilities (heat, electricity) when it was really needed?: No   Alcohol Use: Not At Risk (3/25/2021)    Received from [employer]    AUDIT-C     Frequency of Alcohol Consumption: Never     Average Number of Drinks: Not on file     Frequency of Binge Drinking: Not on file   Transportation Needs: Low Risk  (9/6/2024)    Transportation Needs     Within the past 12 months, has lack of transportation kept you from medical appointments, getting your medicines, non-medical meetings or appointments, work, or from getting things that you need?: No   Physical Activity: Not on file   Interpersonal Safety: Low Risk  (9/6/2024)    Interpersonal Safety     Do you feel physically and emotionally safe where you currently live?: Yes     Within the past 12 months, have you been  hit, slapped, kicked or otherwise physically hurt by someone?: No     Within the past 12 months, have you been humiliated or emotionally abused in other ways by your partner or ex-partner?: No   Stress: Not on file   Social Connections: Unknown (1/1/2022)    Received from [employer] [facility]    Social Connections     Frequency of Communication with Friends and Family: Not on file   Health Literacy: Not on file       Functional Status:  Prior to admission patient needed assistance:   Dependent ADLs:: Ambulation-walker, Bathing, Dressing, Grooming, Positioning, Wheelchair-with assist, Toileting  Dependent IADLs:: Cleaning, Cooking, Laundry, Shopping, Meal Preparation, Medication Management, Money Management, Transportation       Mental Health Status:  Mental Health Status: No Current Concerns       Chemical Dependency Status:  Chemical Dependency Status: No Current Concerns             Values/Beliefs:  Spiritual, Cultural Beliefs, Buddhist Practices, Values that affect care: no               Discussed  Partnership in Safe Discharge Planning  document with patient/family: No    Additional Information:  Patient resides at [facility].  Conversed with Nurse Ros from the [facility].  Patient is an assist of one and receives full services that include, hygiene cares, toileting, BGM in AM, wound care, medications and meals.  Patient wears a bracelet that she pushes the button when needing to use the bathroom.  Patient has home care thru [facility] RN/PT/OT.  Patient's family will also have a Hospice meeting on 9/10 with [facility].  This had been ordered by patient's PCP, LYN Arellano [facility], [facility].  [facility] is able to accept patient back tomorrow.  They would like patient to return by 1:00 PM.  Conversed also w with patient's Son Vignesh and Daughter Ludmila Hernandez.  Patient's primary HCA is Daughter Regina.  She is currently on a cruise, so  Vignesh is the secondary HCA and patient's decision maker.  He shares that patient's Son Ubaldo will be transporting patient back to [facility].  They will let him know to arrive here around 11:00 AM tomorrow.  Patient will need to eat lunch here.  Orders will need to be faxed to [facility] at ([phone]).  If there are new medications, they need to be filled at [facility].    Next Steps:   Discharge back to [facility] tomorrow.  Orders will need to be faxed to [facility] at ([phone]).  If there are new medications, they need to be filled at [facility].  [facility] will need to be resumed.      Olga Pedraza, RN  Inpatient Care Management  [phone]

## 2024-09-06 NOTE — PLAN OF CARE
Goal Outcome Evaluation:      Plan of Care Reviewed With: caregiver, family          Outcome Evaluation: Back to [facility] 9/7

## 2024-09-07 VITALS
RESPIRATION RATE: 18 BRPM | OXYGEN SATURATION: 95 % | BODY MASS INDEX: 19.15 KG/M2 | SYSTOLIC BLOOD PRESSURE: 142 MMHG | WEIGHT: 112.2 LBS | HEIGHT: 64 IN | DIASTOLIC BLOOD PRESSURE: 72 MMHG | HEART RATE: 64 BPM | TEMPERATURE: 97.7 F

## 2024-09-07 LAB — GLUCOSE BLDC GLUCOMTR-MCNC: 190 MG/DL (ref 70–99)

## 2024-09-07 PROCEDURE — 99239 HOSP IP/OBS DSCHRG MGMT >30: CPT | Performed by: HOSPITALIST

## 2024-09-07 PROCEDURE — G0378 HOSPITAL OBSERVATION PER HR: HCPCS

## 2024-09-07 PROCEDURE — 250N000013 HC RX MED GY IP 250 OP 250 PS 637: Performed by: INTERNAL MEDICINE

## 2024-09-07 RX ADMIN — ACETAMINOPHEN 1000 MG: 500 TABLET, FILM COATED ORAL at 08:05

## 2024-09-07 RX ADMIN — TAMSULOSIN HYDROCHLORIDE 0.4 MG: 0.4 CAPSULE ORAL at 08:06

## 2024-09-07 RX ADMIN — LEVOTHYROXINE SODIUM 25 MCG: 25 TABLET ORAL at 08:06

## 2024-09-07 RX ADMIN — CARVEDILOL 6.25 MG: 6.25 TABLET, FILM COATED ORAL at 08:05

## 2024-09-07 ASSESSMENT — ACTIVITIES OF DAILY LIVING (ADL)
ADLS_ACUITY_SCORE: 34

## 2024-09-07 NOTE — PLAN OF CARE
Neuro: A&O x4 forgetful  Tele/cardiac: 100% vpaced  Respiration: RA  Activity: A1 GBW  Pain: denies  Drips: PIV SL  Drains/tubes: None  Skin: coccyx wound  GI/: continent  Aggression color: green  Isolation: none  Plan: discharge back to [facility] today before 1300

## 2024-09-07 NOTE — PROGRESS NOTES
"Patient Name: Nina  MRN: [id number]  Date of Admission: 9/4/2024  Reason for Admission: SOB - Recurrent PEffusion  Level of Care: MS    Vitals:   BP Readings from Last 1 Encounters:   09/07/24 (!) 142/72     Pulse Readings from Last 1 Encounters:   09/07/24 64     Wt Readings from Last 1 Encounters:   09/07/24 50.9 kg (112 lb 3.2 oz)     Ht Readings from Last 1 Encounters:   09/04/24 1.626 m (5' 4\")     Estimated body mass index is 19.26 kg/m  as calculated from the following:    Height as of this encounter: 1.626 m (5' 4\").    Weight as of this encounter: 50.9 kg (112 lb 3.2 oz).  Temp Readings from Last 1 Encounters:   09/07/24 97.7  F (36.5  C) (Oral)       Pain: Pain goal 0 Pain Rating 0 Effective pain medication/regimen PRN meds    CV Surgery Patient: No    Assessment    Resp: LS diminished - RA  Telemetry: 100% V paced  Neuro: A/Ox4 - forgetful  GI/: Continent of B/B - BS audible  Skin/Wounds: Coccyx wound - WOC order in place  Lines/Drains: Removed at D/C  Activity: Ax1 GB/W  Sleep: Yes  Abnormal Labs: Na    Aggression Stop Light: Green          Patient Care Plan: Pt Discharge to [facility] via family at 1100   "

## 2024-09-07 NOTE — PROGRESS NOTES
Care Management Discharge Note    Discharge Date: 09/07/2024       Discharge Disposition: Assisted Living    Discharge Services: Home Care    Discharge DME: None    Discharge Transportation: family or friend will provide    Private pay costs discussed: Not applicable    Does the patient's insurance plan have a 3 day qualifying hospital stay waiver?  No    PAS Confirmation Code:    Patient/family educated on Medicare website which has current facility and service quality ratings:      Education Provided on the Discharge Plan: Yes  Persons Notified of Discharge Plans: RN  Patient/Family in Agreement with the Plan: yes    Handoff Referral Completed: No, handoff not indicated or clinically appropriate    Additional Information:  Pt will discharge today, back to [facility], as planned via family. [facility] faxed to [facility]. Nursing aware of plan.     ANALI Calle, [facility]  [phone] Desk phone  [phone] Cell/text (Preferred)  [facility]

## 2024-09-07 NOTE — DISCHARGE SUMMARY
[facility]  Hospitalist Discharge Summary      Date of Admission:  9/4/2024  Date of Discharge:  9/7/2024  Discharging Provider: Raúl Mcclellan MD  Discharge Service: Hospitalist Service    Discharge Diagnoses   R Pleural effusion, recurrent s/p thoracentesis 9/6 most recently  Elevated troponin  Chronic HFrEF and HFpEF  Non-obstructive CAD  Pulmonary hypertension  Severe MR  HTN  HLD  Natremia  CKD III  Paroxysmal atrial fibrillation on apixaban  History of complete heart block status post PPM 5/2021  DM2  Coccyx wound  Anxiety  Insomnia  Hypothyroidism  Urinary incontinence      Clinically Significant Risk Factors     # DMII: A1C = 6.8 % (Ref range: <5.7 %) within past 6 months       Follow-ups Needed After Discharge   Follow-up Appointments     Follow Up and recommended labs and tests      Follow up with PCP in 10-14 days and arrange for outpatient repeat   thoracentesis            Unresulted Labs Ordered in the Past 30 Days of this Admission       No orders found from 8/5/2024 to 9/5/2024.        These results will be followed up by NA    Discharge Disposition   Discharged to assisted living  Condition at discharge: Stable    Hospital Course   Lisa Meraz is a 89 year old female admitted on 9/4/2024. She presents with shortness of breath     R Pleural effusion, recurrent s/p thoracentesis 9/6 most recently  Elevated troponin, likely demand ischemia  Chronic HFrEF and HFpEF  Non-obstructive CAD  Pulmonary hypertension  Severe MR  HTN  HLD  [PTAbumex 1 mg daily, carvedilol 6.25 mg BID, simvastatin 10 mg HS]  *thoracentesis 6/28/24 w/ 800 ml removed. BNP had been 25k at that time. Echo 6/2024 w/ LVH, EF 35-40%  *[place]ives in [place]. Worsening weakness and fatigue over weeks. Also SOB. No CP, edema reportedly stable. Dtr states weight is up. In ED O2 sats low 90%s. Other VSS. BNP 5,664 (was 13,520 8/21). Troponin 62. CT chest w/ large R pleural effusion w/ RM and RLL atelectasis. EKG  v-paced.   - repeat troponin flat 62 --> 62  - IR for US thoracentesis and she had 800 ml on 9/5 and 900 ml on 9/6  - resume PTA meds except diuretics in setting of ongoing thoracentesis and resume at discharge  - Consideration for thoracic surgery involvement in the future but for now will arrange with PCP for monthly thoracentesis.  - daily weights, goal 106 - 108, her current weight is 111 - 112 lbs  - follow up with PCP/outpatient team for ongoing care after hospitalization     Hyponatremia  CKD III  Low Na mild, 132. Baseline Creatinine 1-1.1.   - monitor     Paroxysmal atrial fibrillation  Hx CHB s/p ppm 5/2021  [PTA apixaban 2.5 mg daily, carvedilol]  - hold apixaban in hospital, resume at discharge  - resume carvedilol     DM II  - hold metformin, farxiga  - BID and HS blood sugars     Coccyx wound  Being cared for outpatient by wound clinic.   - WOC consult     Anxiety  insomnia  - resume trazodone      Hypothyroidism  - continue levothyroxine     Urinary incontinence  - resume tamsulosin    Consultations This Hospital Stay   PHYSICAL THERAPY ADULT IP CONSULT  WOUND OSTOMY CONTINENCE NURSE  IP CONSULT  CARE MANAGEMENT / SOCIAL WORK IP CONSULT    Code Status   No CPR- Do NOT Intubate    Time Spent on this Encounter   I, Raúl Mcclellan MD, personally saw the patient today and spent greater than 30 minutes discharging this patient.       Raúl Mcclellan MD  [place] [address] [address] [address]  Phone: [phone]  ______________________________________________________________________    Physical Exam   Vital Signs: Temp: 97.7  F (36.5  C) Temp src: Oral BP: (!) 142/72 Pulse: 64   Resp: 18 SpO2: 95 % O2 Device: None (Room air)    Weight: 112 lbs 3.2 oz    Gen: NAD, pleasant, thin, frail appearing, up in chair  HEENT: EOMI, MMM  Resp: no focal crackles,  no wheezes, no increased work of resp  CV: S1S2 heard, reg rhythm, reg rate  Abdo: soft, nontender,  nondistended, bowel sounds present  Ext: calves nontender, well perfused  Neuro: aa, conversant, moving ext, CN grossly intact, no facial asymmetry         Primary Care Physician   Shirley Ackerman    Discharge Orders      General info for SNF    Length of Stay Estimate: Long Term Care  Condition at Discharge: Stable  Level of care:skilled   Rehabilitation Potential: Fair  Admission H&P remains valid and up-to-date: Yes  Recent Chemotherapy: N/A  Use Nursing Home Standing Orders: Yes     Mantoux instructions    Give two-step Mantoux (PPD) Per Facility Policy Yes     Follow Up and recommended labs and tests    Follow up with PCP in 10-14 days and arrange for outpatient repeat thoracentesis     Reason for your hospital stay    Admit with recurrent pleural effusion     Activity - Up with nursing assistance     Resume Home Care Services    Please resume prior to admission home care OT/RN/PT     No CPR- Do NOT Intubate     Diet    Follow this diet upon discharge: Current Diet:Orders Placed This Encounter      Moderate Consistent Carb (60 g CHO per Meal) Diet       Significant Results and Procedures   Most Recent 3 CBC's:  Recent Labs   Lab Test 09/04/24 2030 08/14/24  1135 07/05/24  0746   WBC 6.8 6.3 18.4*   HGB 13.4 11.3* 8.5*   MCV 96 99 88    183 305     Most Recent 3 BMP's:  Recent Labs   Lab Test 09/07/24  0724 09/06/24  2101 09/06/24  1553 09/06/24  0735 09/06/24  0631 09/05/24  0823 09/04/24 2030 08/14/24  1135   NA  --   --   --   --  136  --  132* 135   POTASSIUM  --   --   --   --  3.8  --  5.0 4.2   CHLORIDE  --   --   --   --  91*  --  88* 93*   CO2  --   --   --   --  37*  --  31* 26   BUN  --   --   --   --  27.4*  --  30.1* 29.1*   CR  --   --   --   --  1.04*  --  1.09* 1.01*   ANIONGAP  --   --   --   --  8  --  13 16*   JHONATHAN  --   --   --   --  9.2  --  9.4 9.3   * 349* 244*   < > 178*   < > 151* 132*    < > = values in this interval not displayed.     Most Recent 2 LFT's:  Recent  Labs   Lab Test 08/14/24  1135 06/23/24  1046   AST 29 27   ALT 28 27   ALKPHOS 99 50   BILITOTAL 0.3 0.4     7-Day Micro Results       Collected Updated Procedure Result Status      09/05/2024 1200 09/05/2024 1324 Cell count with differential fluid [[id number]]    Pleural fluid from Lung, Right    Final result Component Value   No component results            09/05/2024 1200 09/05/2024 1324 Cell Count Body Fluid [[id number]]    Pleural fluid from Lung, Right    Final result Component Value Units   Color Yellow    Clarity Clear    Cell Count Fluid Source Pleural Cavity, Right    Total Nucleated Cells 219 /uL            09/05/2024 1200 09/05/2024 1324 Differential Body Fluid [[id number]]    Pleural fluid from Lung, Right    Final result Component Value Units   % Neutrophils 3 %   % Lymphocytes 70 %   % Monocyte/Macrophages 27 %                  Most Recent Urinalysis:  Recent Labs   Lab Test 07/04/24  1809   COLOR Yellow   APPEARANCE Cloudy*   URINEGLC Negative   URINEBILI Negative   URINEKETONE Negative   SG 1.020   UBLD Moderate*   URINEPH 6.0   PROTEIN 30*   NITRITE Positive*   LEUKEST Large*   RBCU 10*   WBCU >182*   ,   Results for orders placed or performed during the hospital encounter of 09/04/24   Chest CT w/o contrast    Narrative    EXAM: CT CHEST W/O CONTRAST  LOCATION: [facility]  DATE: 9/4/2024    INDICATION: sob, eval for worsening pleural effusion  COMPARISON: Chest x-ray dated 7/4/2024, chest CT dated 6/27/2024  TECHNIQUE: CT chest without IV contrast. Multiplanar reformats were obtained. Dose reduction techniques were used.  CONTRAST: None.    FINDINGS:   LUNGS AND PLEURA: Large right pleural effusion, slightly increased in interval. Trace left pleural effusion, decreased. The majority of the right middle and lower lobe are atelectatic. Persistent intralobular septal thickening. Overall improved aeration   within the ventilated lungs.    MEDIASTINUM/AXILLAE: Stable  cardiomegaly. Small pericardial effusion, likely unchanged. Cardiac pacemaker. Dense mitral valve annular calcification.    CORONARY ARTERY CALCIFICATION: Moderate.    UPPER ABDOMEN: No acute abnormalities    MUSCULOSKELETAL: No acute bony abnormalities.      Impression    IMPRESSION:   1.  Increasing, large right pleural effusion with associated right middle and lower lobe atelectasis.  2.  Cardiomegaly and small pericardial effusion, likely unchanged.     US Thoracentesis    Narrative    ULTRASOUND GUIDED THORACENTESIS  9/5/2024 12:27 PM     HISTORY: recurrent R sided effusion    FINDINGS: Ultrasound was used to evaluate for the presence and best  approach for drainage of a pleural effusion. Written and oral informed  consent was obtained. A pause for the cause procedure to verify the  correct patient and correct procedure. The skin overlying the right  chest posteriorly was prepped and draped in the usual sterile fashion.  The subcutaneous tissues were anesthetized with 6 mL 1% lidocaine. A  catheter was advanced into the pleural space and 800 mL of  yellow  colored fluid was drained. Ultrasound images were permanently stored.   There were no immediate complications. Patient left the ultrasound  suite in satisfactory condition.      Impression    IMPRESSION: Technically successful thoracentesis without immediate  complications.    TANVIR HURLEY MD         SYSTEM ID:  [id number]   US Thoracentesis    Narrative    EXAM:  1. RIGHT THORACENTESIS  2. ULTRASOUND GUIDANCE  LOCATION: [facility]  DATE/TIME: 9/6/2024 3:01 PM    INDICATION: Pleural effusion.    PROCEDURE: Informed consent obtained. Time out performed. The chest  was prepped and draped in a sterile fashion. 10 mL of 1% lidocaine was  infused into local soft tissues. A 5 Welsh catheter system was  introduced into the pleural effusion under ultrasound guidance.    0.9 liters of clear fluid were removed and sent to lab if requested.    Patient  tolerated procedure well.    Ultrasound images have been permanently captured for documentation.      Impression    IMPRESSION:  Status post ultrasound-guided right thoracentesis.    SHEREEN WALDROP MD         SYSTEM ID:  [id number]       Discharge Medications   Current Discharge Medication List        CONTINUE these medications which have NOT CHANGED    Details   acetaminophen (TYLENOL) 500 MG tablet Take 2 tablets (1,000 mg) by mouth 3 times daily 7:30am, 1pm, 9pm  Qty: 540 tablet, Refills: 3    Associated Diagnoses: Primary osteoarthritis involving multiple joints      apixaban ANTICOAGULANT (ELIQUIS) 2.5 MG tablet Take 1 tablet (2.5 mg) by mouth 2 times daily  Qty: 180 tablet, Refills: 3    Associated Diagnoses: Intermittent atrial fibrillation (H)      bumetanide (BUMEX) 1 MG tablet Take 1 mg by mouth daily      calcium carbonate (OS-JHONATHAN) 500 MG tablet Take 1 tablet (500 mg) by mouth daily  Qty: 30 tablet, Refills: 11    Associated Diagnoses: S/P ORIF (open reduction internal fixation) fracture      carvedilol (COREG) 6.25 MG tablet Give 1 tablet two times daily with meals. Hold for SBP < 100 or HR < 60  Qty: 180 tablet, Refills: 3    Associated Diagnoses: Congestive heart failure, unspecified HF chronicity, unspecified heart failure type (H)      cholecalciferol ( ULTRA STRENGTH) 50 MCG (2000 UT) CAPS Take 1 capsule by mouth daily  Qty: 90 capsule, Refills: 3    Associated Diagnoses: Other osteoporosis without current pathological fracture      dapagliflozin (FARXIGA) 10 MG TABS tablet Take 10 mg by mouth daily.      glucose 40 % (400 mg/mL) gel Take 15-30 g by mouth every 15 minutes as needed for low blood sugar    Associated Diagnoses: Type 2 diabetes mellitus with hyperglycemia, without long-term current use of insulin (H)      levothyroxine (SYNTHROID/LEVOTHROID) 25 MCG tablet Take 25 mcg by mouth daily      loperamide (IMODIUM) 2 MG capsule Take 2 mg by mouth 4 times daily as needed for diarrhea.       metFORMIN (GLUCOPHAGE) 500 MG tablet Take 2 tablets (1,000 mg) by mouth 2 times daily (with meals)  Qty: 360 tablet, Refills: 3    Associated Diagnoses: Type 2 diabetes mellitus with hyperglycemia, without long-term current use of insulin (H)      methylcellulose (CITRUCEL) 500 MG TABS tablet Take 1 tablet (500 mg) by mouth three times a week.  Qty: 60 tablet, Refills: 11    Associated Diagnoses: Slow transit constipation      multivitamin w/minerals (THERA-VIT-M) tablet Take 1 tablet by mouth daily  Qty: 90 tablet, Refills: 3    Associated Diagnoses: Closed nondisplaced intertrochanteric fracture of left femur, initial encounter (H)      polyethylene glycol (MIRALAX) 17 GM/Dose powder Take 17 g by mouth daily as needed for constipation    Associated Diagnoses: Closed nondisplaced intertrochanteric fracture of left femur, initial encounter (H)      senna-docusate (SENOKOT-S/PERICOLACE) 8.6-50 MG tablet Take 2 tablets by mouth daily as needed for constipation.      simvastatin (ZOCOR) 10 MG tablet Take 1 tablet (10 mg) by mouth every evening  Qty: 90 tablet, Refills: 3    Associated Diagnoses: Hyperlipidemia LDL goal <70      tamsulosin (FLOMAX) 0.4 MG capsule Take 1 capsule (0.4 mg) by mouth daily  Qty: 90 capsule, Refills: 3    Associated Diagnoses: Urinary retention      traZODone (DESYREL) 50 MG tablet Take 0.5 tablets (25 mg) by mouth at bedtime  Qty: 45 tablet, Refills: 3    Associated Diagnoses: Insomnia           Allergies   No Known Allergies

## 2024-09-09 NOTE — PLAN OF CARE
Physical Therapy Discharge Summary    Reason for therapy discharge:    Discharged to home with home therapy.    Progress towards therapy goal(s). See goals on Care Plan in [facility] electronic health record for goal details.  Goals partially met.  Barriers to achieving goals:   discharge from facility.    Therapy recommendation(s):    Continued therapy is recommended.  Rationale/Recommendations:   . Pt slightly below baseline due to decreased activity tolerance. Currently Ax1 with FWW. Lives at [facility] where she has assist for all mobility and ADLs, WC rides to meals. Appropriate to return home wtih prior level of assist and continued HHPT.  PT Brief overview of current status: Ax1 FWW. Goals of therapy will be to address safe mobility and make recs for d/c to next level of care. Pt and RN will continue to follow all falls risk precautions as documented by RN staff while hospitalized.          Recommendation above provided by last treating therapist.

## 2024-09-11 ENCOUNTER — ASSISTED LIVING VISIT (OUTPATIENT)
Dept: GERIATRICS | Facility: CLINIC | Age: 89
End: 2024-09-11
Payer: COMMERCIAL

## 2024-09-11 VITALS
RESPIRATION RATE: 14 BRPM | BODY MASS INDEX: 19.97 KG/M2 | DIASTOLIC BLOOD PRESSURE: 66 MMHG | HEIGHT: 64 IN | SYSTOLIC BLOOD PRESSURE: 146 MMHG | WEIGHT: 117 LBS | HEART RATE: 65 BPM | OXYGEN SATURATION: 93 % | TEMPERATURE: 98.5 F

## 2024-09-11 DIAGNOSIS — J90 RECURRENT RIGHT PLEURAL EFFUSION: Primary | ICD-10-CM

## 2024-09-11 DIAGNOSIS — L89.153 PRESSURE INJURY OF SACRAL REGION, STAGE 3 (H): ICD-10-CM

## 2024-09-11 DIAGNOSIS — Z98.890 S/P THORACENTESIS: ICD-10-CM

## 2024-09-11 DIAGNOSIS — N18.31 TYPE 2 DIABETES MELLITUS WITH STAGE 3A CHRONIC KIDNEY DISEASE, WITHOUT LONG-TERM CURRENT USE OF INSULIN (H): ICD-10-CM

## 2024-09-11 DIAGNOSIS — I25.10 CORONARY ARTERY DISEASE INVOLVING NATIVE CORONARY ARTERY OF NATIVE HEART WITHOUT ANGINA PECTORIS: ICD-10-CM

## 2024-09-11 DIAGNOSIS — I34.0 SEVERE MITRAL REGURGITATION: ICD-10-CM

## 2024-09-11 DIAGNOSIS — I48.0 PAROXYSMAL ATRIAL FIBRILLATION (H): ICD-10-CM

## 2024-09-11 DIAGNOSIS — R32 URINARY INCONTINENCE, UNSPECIFIED TYPE: ICD-10-CM

## 2024-09-11 DIAGNOSIS — E03.9 HYPOTHYROIDISM, UNSPECIFIED TYPE: ICD-10-CM

## 2024-09-11 DIAGNOSIS — I10 BENIGN ESSENTIAL HYPERTENSION: ICD-10-CM

## 2024-09-11 DIAGNOSIS — F51.01 PRIMARY INSOMNIA: ICD-10-CM

## 2024-09-11 DIAGNOSIS — I50.9 CONGESTIVE HEART FAILURE, UNSPECIFIED HF CHRONICITY, UNSPECIFIED HEART FAILURE TYPE (H): ICD-10-CM

## 2024-09-11 DIAGNOSIS — E11.22 TYPE 2 DIABETES MELLITUS WITH STAGE 3A CHRONIC KIDNEY DISEASE, WITHOUT LONG-TERM CURRENT USE OF INSULIN (H): ICD-10-CM

## 2024-09-11 DIAGNOSIS — Z95.0 S/P PLACEMENT OF CARDIAC PACEMAKER: ICD-10-CM

## 2024-09-11 DIAGNOSIS — I27.20 PULMONARY HYPERTENSION (H): ICD-10-CM

## 2024-09-11 DIAGNOSIS — N18.31 CHRONIC KIDNEY DISEASE, STAGE 3A (H): ICD-10-CM

## 2024-09-11 DIAGNOSIS — E03.9 HYPOTHYROIDISM: Primary | ICD-10-CM

## 2024-09-11 PROCEDURE — 99350 HOME/RES VST EST HIGH MDM 60: CPT | Performed by: NURSE PRACTITIONER

## 2024-09-11 RX ORDER — LEVOTHYROXINE SODIUM 25 UG/1
25 TABLET ORAL DAILY
Qty: 30 TABLET | Refills: 11 | Status: SHIPPED | OUTPATIENT
Start: 2024-09-11

## 2024-09-11 NOTE — LETTER
9/11/2024      [facility]  [address] [facility] [address]        [facility] GERIATRICS    PRIMARY CARE PROVIDER AND CLINIC:  LYN Arellano [facility], [address] [facility] [address]  Chief Complaint   Patient presents with    Hospital F/U      [place] Medical Record Number:  [id number]  Place of Service where encounter took place:  [facility] ([facility]GS) [[id number]]    Lisa Meraz  is a 89 year old  (4/11/1934), returned to the above facility from  [facility]. Hospital stay 9/4/24 - 9/7/24. .     HPI:      PMH: ***    Admitted to [facility] 9/4-9/7/24 due to shortness of breath with weakness r/t recurrent R pleural effusion. S/p thoracentesis on 9/5/24, removed 800cc. Repeat s/p thoracentesis 9/6/24, removed 900cc. Recommending monthly outpatient paracentesis. Discharged back to [facility].       Today's concerns:    Admits to fatigue. Denies shortness of breath and denies LUQ abdominal pain.   Ambulates w/walker to bathroom. Reports improved appetite. Sleeping well at night. Denies chest pain, SOB, headache, syncope.      CODE STATUS/ADVANCE DIRECTIVES DISCUSSION:  No CPR- Do NOT Intubate    ALLERGIES: No Known Allergies   PAST MEDICAL HISTORY:   Past Medical History:   Diagnosis Date    Acute cystitis with hematuria     DM due to underlying condition with ketoacidosis with coma (H)     Generalized edema     Hypo-osmolality and hyponatremia     Hypokalemia     Intertrochanteric fracture of left femur, closed, with routine healing, subsequent encounter     Mixed hyperlipidemia     Non-pressure chronic ulcer of back with unspecified severity (H)     Other acute postprocedural pain     Other iron deficiency anemias     PAF (paroxysmal atrial fibrillation) (H)     Pleural effusion, not elsewhere classified     Psychophysiological insomnia     Retention of urine, unspecified     Type 2 diabetes mellitus with other specified  complication (H)     Unspecified severe protein-calorie malnutrition (H24)     Unspecified systolic (congestive) heart failure (H)     Unsteadiness on feet       PAST SURGICAL HISTORY:   has a past surgical history that includes Open reduction internal fixation hip nailing (Left, 06/24/2024); appendectomy; and tonsillectomy.  FAMILY HISTORY: family history includes Arthritis in her mother; CABG in her father; Cerebrovascular Disease in her sister; Diabetes in her mother; Heart Disease in her father; Prostate Cancer in her father.  SOCIAL HISTORY:   reports that she has never smoked. She has never used smokeless tobacco. She reports current alcohol use. She reports that she does not use drugs.  Patient's living condition: lives in an assisted living facility    Post Discharge Medication Reconciliation Status:   MED REC REQUIRED  Post Medication Reconciliation Status: discharge medications reconciled and changed, per note/orders     Current Outpatient Medications   Medication Sig Dispense Refill    acetaminophen (TYLENOL) 500 MG tablet Take 2 tablets (1,000 mg) by mouth 3 times daily 7:30am, 1pm, 9pm 540 tablet 3    apixaban ANTICOAGULANT (ELIQUIS) 2.5 MG tablet Take 1 tablet (2.5 mg) by mouth 2 times daily 180 tablet 3    bumetanide (BUMEX) 1 MG tablet Take 1 mg by mouth daily      calcium carbonate (OS-JHONATHAN) 500 MG tablet Take 1 tablet (500 mg) by mouth daily 30 tablet 11    carvedilol (COREG) 6.25 MG tablet Give 1 tablet two times daily with meals. Hold for SBP < 100 or HR < 60 180 tablet 3    cholecalciferol ( ULTRA STRENGTH) 50 MCG (2000 UT) CAPS Take 1 capsule by mouth daily 90 capsule 3    dapagliflozin (FARXIGA) 10 MG TABS tablet Take 10 mg by mouth daily.      glucose 40 % (400 mg/mL) gel Take 15-30 g by mouth every 15 minutes as needed for low blood sugar      levothyroxine (SYNTHROID/LEVOTHROID) 25 MCG tablet Take 25 mcg by mouth daily      loperamide (IMODIUM) 2 MG capsule Take 2 mg by mouth 4 times  "daily as needed for diarrhea.      metFORMIN (GLUCOPHAGE) 500 MG tablet Take 2 tablets (1,000 mg) by mouth 2 times daily (with meals) 360 tablet 3    methylcellulose (CITRUCEL) 500 MG TABS tablet Take 1 tablet (500 mg) by mouth three times a week. (Patient taking differently: Take 500 mg by mouth three times a week. M,W,F) 60 tablet 11    multivitamin w/minerals (THERA-VIT-M) tablet Take 1 tablet by mouth daily 90 tablet 3    polyethylene glycol (MIRALAX) 17 GM/Dose powder Take 17 g by mouth daily as needed for constipation      senna-docusate (SENOKOT-S/PERICOLACE) 8.6-50 MG tablet Take 2 tablets by mouth daily as needed for constipation.      simvastatin (ZOCOR) 10 MG tablet Take 1 tablet (10 mg) by mouth every evening 90 tablet 3    tamsulosin (FLOMAX) 0.4 MG capsule Take 1 capsule (0.4 mg) by mouth daily 90 capsule 3    traZODone (DESYREL) 50 MG tablet Take 0.5 tablets (25 mg) by mouth at bedtime 45 tablet 3     No current facility-administered medications for this visit.       ROS:  {ROS FGS:559536}      Vitals:  BP (!) 146/66   Pulse 65   Temp 98.5  F (36.9  C)   Resp 14   Ht 1.626 m (5' 4\")   Wt 53.1 kg (117 lb)   SpO2 93%   BMI 20.08 kg/m    Exam:  GENERAL APPEARANCE:  {[facility] GENERAL APPEARANCE:131362}  ENT:  {[facility] ENT PE:121571::\"Mouth and posterior oropharynx normal, moist mucous membranes\"}  EYES:  {[facility] EYES PE :665484::\"EOM, conjunctivae, lids, pupils and irises normal\"}  RESP:  {[facility] RESP PE:013634}  CV:  {[facility] CV PE:647607}  ABDOMEN:  {[facility] GI PE:654087::\"normal bowel sounds, soft, nontender, no hepatosplenomegaly or other masses\"}  M/S:   {[facility] M/S PE:531139}  SKIN:  {[facility] PE:757785}  NEURO:   {[facility] NEURO PE:533920}  PSYCH:  {[facility] PSYCH PE:247726}    Wt Readings from Last 4 Encounters:   09/11/24 53.1 kg (117 lb)   09/07/24 50.9 kg (112 lb 3.2 oz)   09/04/24 54.4 kg (120 lb)   08/20/24 59.6 kg (131 lb 6.4 oz) "       Lab/Diagnostic data:  Recent labs in [facility] reviewed by me today.   Most Recent 3 CBC's:  Recent Labs   Lab Test 09/04/24 2030 08/14/24  1135 07/05/24  0746   WBC 6.8 6.3 18.4*   HGB 13.4 11.3* 8.5*   MCV 96 99 88    183 305     Most Recent 3 BMP's:  Recent Labs   Lab Test 09/07/24  0724 09/06/24  2101 09/06/24  1553 09/06/24  0735 09/06/24  0631 09/05/24  0823 09/04/24 2030 08/14/24  1135   NA  --   --   --   --  136  --  132* 135   POTASSIUM  --   --   --   --  3.8  --  5.0 4.2   CHLORIDE  --   --   --   --  91*  --  88* 93*   CO2  --   --   --   --  37*  --  31* 26   BUN  --   --   --   --  27.4*  --  30.1* 29.1*   CR  --   --   --   --  1.04*  --  1.09* 1.01*   ANIONGAP  --   --   --   --  8  --  13 16*   JHONATHAN  --   --   --   --  9.2  --  9.4 9.3   * 349* 244*   < > 178*   < > 151* 132*    < > = values in this interval not displayed.     Most Recent 2 LFT's:  Recent Labs   Lab Test 08/14/24 1135 06/23/24  1046   AST 29 27   ALT 28 27   ALKPHOS 99 50   BILITOTAL 0.3 0.4       TSH   Date Value Ref Range Status   08/14/2024 4.61 (H) 0.30 - 4.20 uIU/mL Final       Lab Results   Component Value Date    A1C 6.8 08/07/2024       Estimated Creatinine Clearance: 30.1 mL/min (A) (based on SCr of 1.04 mg/dL (H)).        ASSESSMENT/PLAN:    {[facility] DX2:962092}      - Decrease metformin to 500mg BID w/breakfast and dinner dx diabetes  - Check BG BID w/breakfast and dinner dx diabetes  - Continue farxiga 10mg every day       Next appointment: 9/24/24 at 11AM       Orders:  - Restart fiber tablet 500mg three times/week MWF   - Decrease metformin to 500mg BID w/breakfast and dinner dx diabetes  - Check BG BID w/breakfast and dinner dx diabetes  - Check CXR AP&L dx recurrent RLL pleural effusion           Total time spent during today's visit was *** mins including patient visit (8876-2503, 48 minutes) and review of past records (30 minutes).     Electronically signed by:  LYN Arellano CNP             [facility]   2024     Name: Lisa Meraz   : 1934       Orders:  - Restart fiber tablet 500mg three times/week MWF   - Decrease metformin to 500mg BID w/breakfast and dinner dx diabetes  - Check BG BID w/breakfast and dinner dx diabetes  - Check CXR AP&L dx recurrent RLL pleural effusion         Electronically signed by   LYN Arellano CNP on 2024 at 9:59 PM        [facility] GERIATRICS    PRIMARY CARE PROVIDER AND CLINIC:  LYN Arellano CNP, [address] [facility] [address]  Chief Complaint   Patient presents with    Hospital F/U      [place] [facility] Record Number:  [id number]  Place of Service where encounter took place:  [facility]FGS) [[id number]]    Lisa Meraz  is a 89 year old  (1934), returned to the above facility from  [facility]. Hospital stay 24 - 24. .     HPI:      Admitted to [facility] -24 due to shortness of breath with weakness r/t recurrent R pleural effusion. S/p thoracentesis on 24, removed 800cc. Repeat s/p thoracentesis 24, removed 900cc. Recommending monthly outpatient paracentesis. Discharged back to [facility].       Today's concerns:    During exam, patient seen sitting in wheelchair with Mary (daughter) present. Admits to fatigue. Denies shortness of breath and denies LUQ abdominal pain. Ambulates w/walker to bathroom; otherwise, primarily wheelchair dependent. Reports improved appetite. Sleeping well at night. Denies chest pain, SOB, headache, syncope.      CODE STATUS/ADVANCE DIRECTIVES DISCUSSION:  No CPR- Do NOT Intubate    ALLERGIES: No Known Allergies   PAST MEDICAL HISTORY:   Past Medical History:   Diagnosis Date    Acute cystitis with hematuria     DM due to underlying condition with ketoacidosis with coma (H)     Generalized edema     Hypo-osmolality and hyponatremia     Hypokalemia     Intertrochanteric fracture of left femur, closed,  with routine healing, subsequent encounter     Mixed hyperlipidemia     Non-pressure chronic ulcer of back with unspecified severity (H)     Other acute postprocedural pain     Other iron deficiency anemias     PAF (paroxysmal atrial fibrillation) (H)     Pleural effusion, not elsewhere classified     Psychophysiological insomnia     Retention of urine, unspecified     Type 2 diabetes mellitus with other specified complication (H)     Unspecified severe protein-calorie malnutrition (H24)     Unspecified systolic (congestive) heart failure (H)     Unsteadiness on feet       PAST SURGICAL HISTORY:   has a past surgical history that includes Open reduction internal fixation hip nailing (Left, 06/24/2024); appendectomy; and tonsillectomy.  FAMILY HISTORY: family history includes Arthritis in her mother; CABG in her father; Cerebrovascular Disease in her sister; Diabetes in her mother; Heart Disease in her father; Prostate Cancer in her father.  SOCIAL HISTORY:   reports that she has never smoked. She has never used smokeless tobacco. She reports current alcohol use. She reports that she does not use drugs.  Patient's living condition: lives in an assisted living facility    Post Discharge Medication Reconciliation Status:   MED REC REQUIRED  Post Medication Reconciliation Status: discharge medications reconciled and changed, per note/orders     Current Outpatient Medications   Medication Sig Dispense Refill    acetaminophen (TYLENOL) 500 MG tablet Take 2 tablets (1,000 mg) by mouth 3 times daily 7:30am, 1pm, 9pm 540 tablet 3    apixaban ANTICOAGULANT (ELIQUIS) 2.5 MG tablet Take 1 tablet (2.5 mg) by mouth 2 times daily 180 tablet 3    bumetanide (BUMEX) 1 MG tablet Take 1 mg by mouth daily      calcium carbonate (OS-JHONATHAN) 500 MG tablet Take 1 tablet (500 mg) by mouth daily 30 tablet 11    carvedilol (COREG) 6.25 MG tablet Give 1 tablet two times daily with meals. Hold for SBP < 100 or HR < 60 180 tablet 3     "cholecalciferol ( ULTRA STRENGTH) 50 MCG (2000 UT) CAPS Take 1 capsule by mouth daily 90 capsule 3    dapagliflozin (FARXIGA) 10 MG TABS tablet Take 10 mg by mouth daily.      glucose 40 % (400 mg/mL) gel Take 15-30 g by mouth every 15 minutes as needed for low blood sugar      levothyroxine (SYNTHROID/LEVOTHROID) 25 MCG tablet Take 25 mcg by mouth daily      loperamide (IMODIUM) 2 MG capsule Take 2 mg by mouth 4 times daily as needed for diarrhea.      metFORMIN (GLUCOPHAGE) 500 MG tablet Take 2 tablets (1,000 mg) by mouth 2 times daily (with meals) 360 tablet 3    methylcellulose (CITRUCEL) 500 MG TABS tablet Take 1 tablet (500 mg) by mouth three times a week. (Patient taking differently: Take 500 mg by mouth three times a week. M,W,F) 60 tablet 11    multivitamin w/minerals (THERA-VIT-M) tablet Take 1 tablet by mouth daily 90 tablet 3    polyethylene glycol (MIRALAX) 17 GM/Dose powder Take 17 g by mouth daily as needed for constipation      senna-docusate (SENOKOT-S/PERICOLACE) 8.6-50 MG tablet Take 2 tablets by mouth daily as needed for constipation.      simvastatin (ZOCOR) 10 MG tablet Take 1 tablet (10 mg) by mouth every evening 90 tablet 3    tamsulosin (FLOMAX) 0.4 MG capsule Take 1 capsule (0.4 mg) by mouth daily 90 capsule 3    traZODone (DESYREL) 50 MG tablet Take 0.5 tablets (25 mg) by mouth at bedtime 45 tablet 3     No current facility-administered medications for this visit.       ROS:  10 point ROS of systems including Constitutional, Eyes, Respiratory, Cardiovascular, Gastroenterology, Genitourinary, Integumentary, Musculoskeletal, Psychiatric were all negative except for pertinent positives noted in my HPI.      Vitals:  BP (!) 146/66   Pulse 65   Temp 98.5  F (36.9  C)   Resp 14   Ht 1.626 m (5' 4\")   Wt 53.1 kg (117 lb)   SpO2 93%   BMI 20.08 kg/m    Exam:  GENERAL APPEARANCE:  Alert, in no distress, appears healthy, oriented, cooperative  ENT:  Mouth and posterior oropharynx normal, " moist mucous membranes, [place]  EYES:  EOM, conjunctivae, lids, pupils and irises normal, PERRL. +right eyelid increased droopiness from baseline (chronic condition reported by family).  RESP: Poor respiratory effort, lungs diminished throughout with increased diminished sounds to RLL, no respiratory distress  CV:  regular rate and rhythm, no murmur, rub, or gallop; BLE lymphedema (improving)  ABDOMEN:  normal bowel sounds, soft, nontender, no guarding or rebound  M/S:   Gait and station abnormal, ambulates w/walker. Sitting in wheelchair.   SKIN:  Inspection of skin and subcutaneous tissue baseline, Palpation of skin and subcutaneous tissue baseline  NEURO:   Cranial nerves 2-12 are normal tested and grossly at patient's baseline, Examination of sensation by touch normal  PSYCH:  oriented X 3, affect and mood normal    Wt Readings from Last 4 Encounters:   09/11/24 53.1 kg (117 lb)   09/07/24 50.9 kg (112 lb 3.2 oz)   09/04/24 54.4 kg (120 lb)   08/20/24 59.6 kg (131 lb 6.4 oz)       Lab/Diagnostic data:  Recent labs in [facility] reviewed by me today.   Most Recent 3 CBC's:  Recent Labs   Lab Test 09/04/24  2030 08/14/24  1135 07/05/24  0746   WBC 6.8 6.3 18.4*   HGB 13.4 11.3* 8.5*   MCV 96 99 88    183 305     Most Recent 3 BMP's:  Recent Labs   Lab Test 09/07/24  0724 09/06/24  2101 09/06/24  1553 09/06/24  0735 09/06/24  0631 09/05/24  0823 09/04/24  2030 08/14/24  1135   NA  --   --   --   --  136  --  132* 135   POTASSIUM  --   --   --   --  3.8  --  5.0 4.2   CHLORIDE  --   --   --   --  91*  --  88* 93*   CO2  --   --   --   --  37*  --  31* 26   BUN  --   --   --   --  27.4*  --  30.1* 29.1*   CR  --   --   --   --  1.04*  --  1.09* 1.01*   ANIONGAP  --   --   --   --  8  --  13 16*   JHONATHAN  --   --   --   --  9.2  --  9.4 9.3   * 349* 244*   < > 178*   < > 151* 132*    < > = values in this interval not displayed.     Most Recent 2 LFT's:  Recent Labs   Lab Test 08/14/24  1135 06/23/24  1046   AST 29  27   ALT 28 27   ALKPHOS 99 50   BILITOTAL 0.3 0.4       TSH   Date Value Ref Range Status   08/14/2024 4.61 (H) 0.30 - 4.20 uIU/mL Final       Lab Results   Component Value Date    A1C 6.8 08/07/2024       Estimated Creatinine Clearance: 30.1 mL/min (A) (based on SCr of 1.04 mg/dL (H)).        ASSESSMENT/PLAN:    (J90) Recurrent right pleural effusion  (primary encounter diagnosis)  (Z98.890) S/P thoracentesis  (I50.9) Congestive heart failure, unspecified HF chronicity, unspecified heart failure type (H)  (I25.10) Coronary artery disease involving native coronary artery of native heart without angina pectoris  (I27.20) Pulmonary hypertension (H)  (I34.0) Severe mitral regurgitation  (I10) Benign essential hypertension  (I48.0) Paroxysmal atrial fibrillation (H)  (Z95.0) S/P placement of cardiac pacemaker  Comment: Recurrent R pleural effusion since 06/2024. Requires frequent thoracentesis with large volume removed. Last thoracentesis on 9/5/24 removed 800cc fluid, 9/6/24 removed 900cc fluid.  CHF w/preserved EF. Last Echo 7/30/24 found preserved EF. Hx severe mitral regurgitation, mild AVS. Intermittent A fib. Hx paroxysmal complete heart block s/p dual chamber permament pacemaker placement on 5/10/21.   Plan:   - Check CXR AP&L dx recurrent RLL pleural effusion   - Discussed risk/benefit of pleurx drain; patient reports wanting to continue outpatient thoracentesis vs drain placement at this time  - Continue bumex, eliquis, coreg, simvastatin  - [facility] hospice referral. Goals of care are comfort focused    (N18.31) Chronic kidney disease, stage 3a (H)  Comment: Chronic  Plan:   - Goals of care are comfort focused    (E11.22,  N18.31) Type 2 diabetes mellitus with stage 3a chronic kidney disease, without long-term current use of insulin (H)  Comment: Controlled  Plan:   - Decrease metformin to 500mg BID w/breakfast and dinner dx diabetes  - Continue farxiga 10mg every day   - Check BG BID w/breakfast and  dinner dx diabetes    (L89.153) Pressure injury of sacral region, stage 3 (H)  Comment: Chronic  Plan:   - Continue wound care; home care RN following    (F51.01) Primary insomnia  Comment: Chronic  Plan:   - Continue trazodone    (E03.9) Hypothyroidism, unspecified type  Comment: Chronic  Plan:   - Continue levothyroxine    (R32) Urinary incontinence, unspecified type  Comment: Chronic urinary incontinence with hx recurrent urinary retention  Plan:   - Continue flomax  - Monitor symptoms      Orders:  - Restart fiber tablet 500mg three times/week MWF   - Decrease metformin to 500mg BID w/breakfast and dinner dx diabetes  - Check BG BID w/breakfast and dinner dx diabetes  - Check CXR AP&L dx recurrent RLL pleural effusion         Total time spent during today's visit was 78 mins including patient visit (0912-2990, 48 minutes) and review of past records (30 minutes).     Electronically signed by:  LYN Arellano CNP          Sincerely,        LYN Arellano CNP

## 2024-09-11 NOTE — PROGRESS NOTES
[facility] GERIATRICS    PRIMARY CARE PROVIDER AND CLINIC:  Shirley Ackerman, LYN CNP, [address] [facility] [address]  Chief Complaint   Patient presents with    Hospital F/U      [place] Medical Record Number:  [id number]  Place of Service where encounter took place:  [facility])([facility]) [[id number]]    Lisa Meraz  is a 89 year old  (4/11/1934), returned to the above facility from  [facility]. Hospital stay 9/4/24 - 9/7/24. .     HPI:      Admitted to [facility] 9/4-9/7/24 due to shortness of breath with weakness r/t recurrent R pleural effusion. S/p thoracentesis on 9/5/24, removed 800cc. Repeat s/p thoracentesis 9/6/24, removed 900cc. Recommending monthly outpatient paracentesis. Discharged back to [facility].       Today's concerns:    During exam, patient seen sitting in wheelchair with Mary (daughter) present. Admits to fatigue. Denies shortness of breath and denies LUQ abdominal pain. Ambulates w/walker to bathroom; otherwise, primarily wheelchair dependent. Reports improved appetite. Sleeping well at night. Denies chest pain, SOB, headache, syncope.      CODE STATUS/ADVANCE DIRECTIVES DISCUSSION:  No CPR- Do NOT Intubate    ALLERGIES: No Known Allergies   PAST MEDICAL HISTORY:   Past Medical History:   Diagnosis Date    Acute cystitis with hematuria     DM due to underlying condition with ketoacidosis with coma (H)     Generalized edema     Hypo-osmolality and hyponatremia     Hypokalemia     Intertrochanteric fracture of left femur, closed, with routine healing, subsequent encounter     Mixed hyperlipidemia     Non-pressure chronic ulcer of back with unspecified severity (H)     Other acute postprocedural pain     Other iron deficiency anemias     PAF (paroxysmal atrial fibrillation) (H)     Pleural effusion, not elsewhere classified     Psychophysiological insomnia     Retention of urine, unspecified     Type 2 diabetes mellitus with other specified complication  (H)     Unspecified severe protein-calorie malnutrition (H24)     Unspecified systolic (congestive) heart failure (H)     Unsteadiness on feet       PAST SURGICAL HISTORY:   has a past surgical history that includes Open reduction internal fixation hip nailing (Left, 06/24/2024); appendectomy; and tonsillectomy.  FAMILY HISTORY: family history includes Arthritis in her mother; CABG in her father; Cerebrovascular Disease in her sister; Diabetes in her mother; Heart Disease in her father; Prostate Cancer in her father.  SOCIAL HISTORY:   reports that she has never smoked. She has never used smokeless tobacco. She reports current alcohol use. She reports that she does not use drugs.  Patient's living condition: lives in an assisted living facility    Post Discharge Medication Reconciliation Status:   MED REC REQUIRED  Post Medication Reconciliation Status: discharge medications reconciled and changed, per note/orders     Current Outpatient Medications   Medication Sig Dispense Refill    acetaminophen (TYLENOL) 500 MG tablet Take 2 tablets (1,000 mg) by mouth 3 times daily 7:30am, 1pm, 9pm 540 tablet 3    apixaban ANTICOAGULANT (ELIQUIS) 2.5 MG tablet Take 1 tablet (2.5 mg) by mouth 2 times daily 180 tablet 3    bumetanide (BUMEX) 1 MG tablet Take 1 mg by mouth daily      calcium carbonate (OS-JHONATHAN) 500 MG tablet Take 1 tablet (500 mg) by mouth daily 30 tablet 11    carvedilol (COREG) 6.25 MG tablet Give 1 tablet two times daily with meals. Hold for SBP < 100 or HR < 60 180 tablet 3    cholecalciferol ( ULTRA STRENGTH) 50 MCG (2000 UT) CAPS Take 1 capsule by mouth daily 90 capsule 3    dapagliflozin (FARXIGA) 10 MG TABS tablet Take 10 mg by mouth daily.      glucose 40 % (400 mg/mL) gel Take 15-30 g by mouth every 15 minutes as needed for low blood sugar      levothyroxine (SYNTHROID/LEVOTHROID) 25 MCG tablet Take 25 mcg by mouth daily      loperamide (IMODIUM) 2 MG capsule Take 2 mg by mouth 4 times daily as needed  "for diarrhea.      metFORMIN (GLUCOPHAGE) 500 MG tablet Take 2 tablets (1,000 mg) by mouth 2 times daily (with meals) 360 tablet 3    methylcellulose (CITRUCEL) 500 MG TABS tablet Take 1 tablet (500 mg) by mouth three times a week. (Patient taking differently: Take 500 mg by mouth three times a week. M,W,F) 60 tablet 11    multivitamin w/minerals (THERA-VIT-M) tablet Take 1 tablet by mouth daily 90 tablet 3    polyethylene glycol (MIRALAX) 17 GM/Dose powder Take 17 g by mouth daily as needed for constipation      senna-docusate (SENOKOT-S/PERICOLACE) 8.6-50 MG tablet Take 2 tablets by mouth daily as needed for constipation.      simvastatin (ZOCOR) 10 MG tablet Take 1 tablet (10 mg) by mouth every evening 90 tablet 3    tamsulosin (FLOMAX) 0.4 MG capsule Take 1 capsule (0.4 mg) by mouth daily 90 capsule 3    traZODone (DESYREL) 50 MG tablet Take 0.5 tablets (25 mg) by mouth at bedtime 45 tablet 3     No current facility-administered medications for this visit.       ROS:  10 point ROS of systems including Constitutional, Eyes, Respiratory, Cardiovascular, Gastroenterology, Genitourinary, Integumentary, Musculoskeletal, Psychiatric were all negative except for pertinent positives noted in my HPI.      Vitals:  BP (!) 146/66   Pulse 65   Temp 98.5  F (36.9  C)   Resp 14   Ht 1.626 m (5' 4\")   Wt 53.1 kg (117 lb)   SpO2 93%   BMI 20.08 kg/m    Exam:  GENERAL APPEARANCE:  Alert, in no distress, appears healthy, oriented, cooperative  ENT:  Mouth and posterior oropharynx normal, moist mucous membranes, [place]  EYES:  EOM, conjunctivae, lids, pupils and irises normal, PERRL. +right eyelid increased droopiness from baseline (chronic condition reported by family).  RESP: Poor respiratory effort, lungs diminished throughout with increased diminished sounds to RLL, no respiratory distress  CV:  regular rate and rhythm, no murmur, rub, or gallop; BLE lymphedema (improving)  ABDOMEN:  normal bowel sounds, soft, nontender, " no guarding or rebound  M/S:   Gait and station abnormal, ambulates w/walker. Sitting in wheelchair.   SKIN:  Inspection of skin and subcutaneous tissue baseline, Palpation of skin and subcutaneous tissue baseline  NEURO:   Cranial nerves 2-12 are normal tested and grossly at patient's baseline, Examination of sensation by touch normal  PSYCH:  oriented X 3, affect and mood normal    Wt Readings from Last 4 Encounters:   09/11/24 53.1 kg (117 lb)   09/07/24 50.9 kg (112 lb 3.2 oz)   09/04/24 54.4 kg (120 lb)   08/20/24 59.6 kg (131 lb 6.4 oz)       Lab/Diagnostic data:  Recent labs in [facility] reviewed by me today.   Most Recent 3 CBC's:  Recent Labs   Lab Test 09/04/24 2030 08/14/24 1135 07/05/24  0746   WBC 6.8 6.3 18.4*   HGB 13.4 11.3* 8.5*   MCV 96 99 88    183 305     Most Recent 3 BMP's:  Recent Labs   Lab Test 09/07/24  0724 09/06/24  2101 09/06/24  1553 09/06/24  0735 09/06/24  0631 09/05/24  0823 09/04/24 2030 08/14/24  1135   NA  --   --   --   --  136  --  132* 135   POTASSIUM  --   --   --   --  3.8  --  5.0 4.2   CHLORIDE  --   --   --   --  91*  --  88* 93*   CO2  --   --   --   --  37*  --  31* 26   BUN  --   --   --   --  27.4*  --  30.1* 29.1*   CR  --   --   --   --  1.04*  --  1.09* 1.01*   ANIONGAP  --   --   --   --  8  --  13 16*   JHONATHAN  --   --   --   --  9.2  --  9.4 9.3   * 349* 244*   < > 178*   < > 151* 132*    < > = values in this interval not displayed.     Most Recent 2 LFT's:  Recent Labs   Lab Test 08/14/24 1135 06/23/24  1046   AST 29 27   ALT 28 27   ALKPHOS 99 50   BILITOTAL 0.3 0.4       TSH   Date Value Ref Range Status   08/14/2024 4.61 (H) 0.30 - 4.20 uIU/mL Final       Lab Results   Component Value Date    A1C 6.8 08/07/2024       Estimated Creatinine Clearance: 30.1 mL/min (A) (based on SCr of 1.04 mg/dL (H)).        ASSESSMENT/PLAN:    (J90) Recurrent right pleural effusion  (primary encounter diagnosis)  (Z98.890) S/P thoracentesis  (I50.9) Congestive heart  failure, unspecified HF chronicity, unspecified heart failure type (H)  (I25.10) Coronary artery disease involving native coronary artery of native heart without angina pectoris  (I27.20) Pulmonary hypertension (H)  (I34.0) Severe mitral regurgitation  (I10) Benign essential hypertension  (I48.0) Paroxysmal atrial fibrillation (H)  (Z95.0) S/P placement of cardiac pacemaker  Comment: Recurrent R pleural effusion since 06/2024. Requires frequent thoracentesis with large volume removed. Last thoracentesis on 9/5/24 removed 800cc fluid, 9/6/24 removed 900cc fluid.  CHF w/preserved EF. Last Echo 7/30/24 found preserved EF. Hx severe mitral regurgitation, mild AVS. Intermittent A fib. Hx paroxysmal complete heart block s/p dual chamber permament pacemaker placement on 5/10/21.   Plan:   - Check CXR AP&L dx recurrent RLL pleural effusion   - Discussed risk/benefit of pleurx drain; patient reports wanting to continue outpatient thoracentesis vs drain placement at this time  - Continue bumex, eliquis, coreg, simvastatin  - [facility] hospice referral. Goals of care are comfort focused    (N18.31) Chronic kidney disease, stage 3a (H)  Comment: Chronic  Plan:   - Goals of care are comfort focused    (E11.22,  N18.31) Type 2 diabetes mellitus with stage 3a chronic kidney disease, without long-term current use of insulin (H)  Comment: Controlled  Plan:   - Decrease metformin to 500mg BID w/breakfast and dinner dx diabetes  - Continue farxiga 10mg every day   - Check BG BID w/breakfast and dinner dx diabetes    (L89.153) Pressure injury of sacral region, stage 3 (H)  Comment: Chronic  Plan:   - Continue wound care; home care RN following    (F51.01) Primary insomnia  Comment: Chronic  Plan:   - Continue trazodone    (E03.9) Hypothyroidism, unspecified type  Comment: Chronic  Plan:   - Continue levothyroxine    (R32) Urinary incontinence, unspecified type  Comment: Chronic urinary incontinence with hx recurrent urinary  retention  Plan:   - Continue flomax  - Monitor symptoms      Orders:  - Restart fiber tablet 500mg three times/week MWF   - Decrease metformin to 500mg BID w/breakfast and dinner dx diabetes  - Check BG BID w/breakfast and dinner dx diabetes  - Check CXR AP&L dx recurrent RLL pleural effusion         Total time spent during today's visit was 78 mins including patient visit ([id number], 48 minutes) and review of past records (30 minutes).     Electronically signed by:  LYN Arellano CNP

## 2024-09-12 NOTE — PROGRESS NOTES
[facility]   2024     Name: Lisa Meraz   : 1934       Orders:  - Restart fiber tablet 500mg three times/week MWF   - Decrease metformin to 500mg BID w/breakfast and dinner dx diabetes  - Check BG BID w/breakfast and dinner dx diabetes  - Check CXR AP&L dx recurrent RLL pleural effusion         Electronically signed by   LYN Arellano CNP on 2024 at 9:59 PM

## 2024-09-13 ENCOUNTER — TRANSFERRED RECORDS (OUTPATIENT)
Dept: HEALTH INFORMATION MANAGEMENT | Facility: CLINIC | Age: 89
End: 2024-09-13
Payer: COMMERCIAL

## 2024-09-17 ENCOUNTER — LAB REQUISITION (OUTPATIENT)
Dept: LAB | Facility: CLINIC | Age: 89
End: 2024-09-17
Payer: COMMERCIAL

## 2024-09-17 ENCOUNTER — TELEPHONE (OUTPATIENT)
Dept: GERIATRICS | Facility: CLINIC | Age: 89
End: 2024-09-17
Payer: COMMERCIAL

## 2024-09-17 DIAGNOSIS — I50.22 CHRONIC SYSTOLIC (CONGESTIVE) HEART FAILURE (H): ICD-10-CM

## 2024-09-17 DIAGNOSIS — J90 PLEURAL EFFUSION, NOT ELSEWHERE CLASSIFIED: ICD-10-CM

## 2024-09-17 NOTE — TELEPHONE ENCOUNTER
[facility]     Pt is fatigued but not in any visible distress and denies feeling SOB. RR 32. O2 sat 95% RA. Lung sounds clear but diminished R>L. No increased BLE edema noted. Weight today 113. Reports not sleeping well last night. Pt did not sign onto hospice today because family isn't ready.   /66 HR 61 Temp 98.3     Response: Can staff give additional bumex 1mg today prior to 2pm? Then increase bumex to 1mg qAM and 0.5mg QPM (1200) dx CHF, recurrent pleural effusion. Check CBC, CMP on 9/18/24 dx CHF, recurrent pleural effusion     Verbal order/direction given to: Yamileth  Provider Giving Order:  LYN Scott CNP, RN

## 2024-09-18 LAB
ALBUMIN SERPL BCG-MCNC: 3.7 G/DL (ref 3.5–5.2)
ALP SERPL-CCNC: 61 U/L (ref 40–150)
ALT SERPL W P-5'-P-CCNC: 42 U/L (ref 0–50)
ANION GAP SERPL CALCULATED.3IONS-SCNC: 13 MMOL/L (ref 7–15)
AST SERPL W P-5'-P-CCNC: 43 U/L (ref 0–45)
BASOPHILS # BLD AUTO: 0.1 10E3/UL (ref 0–0.2)
BASOPHILS NFR BLD AUTO: 1 %
BILIRUB SERPL-MCNC: 0.3 MG/DL
BUN SERPL-MCNC: 34.9 MG/DL (ref 8–23)
CALCIUM SERPL-MCNC: 9.2 MG/DL (ref 8.8–10.4)
CHLORIDE SERPL-SCNC: 93 MMOL/L (ref 98–107)
CREAT SERPL-MCNC: 0.88 MG/DL (ref 0.51–0.95)
EGFRCR SERPLBLD CKD-EPI 2021: 62 ML/MIN/1.73M2
EOSINOPHIL # BLD AUTO: 0.1 10E3/UL (ref 0–0.7)
EOSINOPHIL NFR BLD AUTO: 1 %
ERYTHROCYTE [DISTWIDTH] IN BLOOD BY AUTOMATED COUNT: 17.6 % (ref 10–15)
GLUCOSE SERPL-MCNC: 195 MG/DL (ref 70–99)
HCO3 SERPL-SCNC: 29 MMOL/L (ref 22–29)
HCT VFR BLD AUTO: 39.8 % (ref 35–47)
HGB BLD-MCNC: 12.1 G/DL (ref 11.7–15.7)
IMM GRANULOCYTES # BLD: 0 10E3/UL
IMM GRANULOCYTES NFR BLD: 1 %
LYMPHOCYTES # BLD AUTO: 0.6 10E3/UL (ref 0.8–5.3)
LYMPHOCYTES NFR BLD AUTO: 10 %
MCH RBC QN AUTO: 30.3 PG (ref 26.5–33)
MCHC RBC AUTO-ENTMCNC: 30.4 G/DL (ref 31.5–36.5)
MCV RBC AUTO: 100 FL (ref 78–100)
MONOCYTES # BLD AUTO: 0.6 10E3/UL (ref 0–1.3)
MONOCYTES NFR BLD AUTO: 10 %
NEUTROPHILS # BLD AUTO: 4.9 10E3/UL (ref 1.6–8.3)
NEUTROPHILS NFR BLD AUTO: 78 %
NRBC # BLD AUTO: 0 10E3/UL
NRBC BLD AUTO-RTO: 0 /100
PLATELET # BLD AUTO: 212 10E3/UL (ref 150–450)
POTASSIUM SERPL-SCNC: 4.3 MMOL/L (ref 3.4–5.3)
PROT SERPL-MCNC: 6 G/DL (ref 6.4–8.3)
RBC # BLD AUTO: 4 10E6/UL (ref 3.8–5.2)
SODIUM SERPL-SCNC: 135 MMOL/L (ref 135–145)
WBC # BLD AUTO: 6.4 10E3/UL (ref 4–11)

## 2024-09-18 PROCEDURE — 85025 COMPLETE CBC W/AUTO DIFF WBC: CPT | Mod: ORL | Performed by: NURSE PRACTITIONER

## 2024-09-18 PROCEDURE — 80053 COMPREHEN METABOLIC PANEL: CPT | Mod: ORL | Performed by: NURSE PRACTITIONER

## 2024-09-18 PROCEDURE — P9603 ONE-WAY ALLOW PRORATED MILES: HCPCS | Mod: ORL | Performed by: NURSE PRACTITIONER

## 2024-09-18 PROCEDURE — 36415 COLL VENOUS BLD VENIPUNCTURE: CPT | Mod: ORL | Performed by: NURSE PRACTITIONER

## 2024-09-27 ENCOUNTER — DOCUMENTATION ONLY (OUTPATIENT)
Dept: OTHER | Facility: CLINIC | Age: 89
End: 2024-09-27
Payer: COMMERCIAL

## 2024-11-05 VITALS
HEART RATE: 66 BPM | OXYGEN SATURATION: 95 % | BODY MASS INDEX: 18.78 KG/M2 | HEIGHT: 64 IN | TEMPERATURE: 97.9 F | RESPIRATION RATE: 22 BRPM | DIASTOLIC BLOOD PRESSURE: 64 MMHG | SYSTOLIC BLOOD PRESSURE: 118 MMHG | WEIGHT: 110 LBS

## 2024-11-05 NOTE — PROGRESS NOTES
"[facility] GERIATRICS    Chief Complaint   Patient presents with    RECHECK     HPI:  Lisa Meraz is a 89 year old  (4/11/1934), who is being seen today for an episodic care visit at: [facility])([facility]) [[id number]].     Today's concern is:           Allergies, and PMH/PSH reviewed in EPIC today.  REVIEW OF SYSTEMS:  {coccmv81:064170}    Objective:   /64   Pulse 66   Temp 97.9  F (36.6  C)   Resp 22   Ht 1.626 m (5' 4\")   Wt 49.9 kg (110 lb)   SpO2 95%   BMI 18.88 kg/m    {Nursing home physical exam :503306}      Patient is on hospice/palliative care and labs are not recommended      Assessment/Plan:    {[facility] DX2:574147}    Stage 3 pressure ulcer to R buttocks healed.       Orders:  - Discontinue scheduled tylenol   - Add tylenol 1000mg every 6hrs PRN dx pain      Electronically signed by: LYN Arellano CNP       " Examination of sensation by touch normal  PSYCH:  oriented X 3, affect and mood normal      Patient is on hospice/palliative care and labs are not recommended      Assessment/Plan:    (J90) Recurrent right pleural effusion  (primary encounter diagnosis)  (Z98.890) S/P thoracentesis  (I50.9) Congestive heart failure, unspecified HF chronicity, unspecified heart failure type (H)  (I25.10) Coronary artery disease involving native coronary artery of native heart without angina pectoris  (I27.20) Pulmonary hypertension (H)  (I34.0) Severe mitral regurgitation  (I10) Benign essential hypertension  (I48.0) Paroxysmal atrial fibrillation (H)  (Z95.0) S/P placement of cardiac pacemaker  Comment: Recurrent R pleural effusion since 06/2024. Requires frequent thoracentesis with large volume removed. Last thoracentesis on 9/5/24 removed 800cc fluid, 9/6/24 removed 900cc fluid.  CHF w/preserved EF. Last Echo 7/30/24 found preserved EF. Hx severe mitral regurgitation, mild AVS. Intermittent A fib. Hx paroxysmal complete heart block s/p dual chamber permament pacemaker placement on 5/10/21.   Plan:   - Discontinue scheduled tylenol   - Add tylenol 1000mg every 6hrs PRN dx pain  - [place]ex[place] [facility]  - [facility] hosp[facility] following; hospice RN follows weekly for pleurx drain treatments. Goals of care are comfort focused    (E11.22,  N18.31) Type 2 diabetes mellitus with stage 3a chronic kidney disease, without long-term current use of insulin (H)  Comment: Controlled  Plan:   - Continue metformin  - Continue daily BG checks per patient preference    (L89.153) Pressure injury of sacral region, stage 3 (H)  Comment: Healed      Orders:  - Discontinue scheduled tylenol   - Add tylenol 1000mg every 6hrs PRN dx pain      Electronically signed by: LYN Arellano CNP

## 2024-11-06 ENCOUNTER — ASSISTED LIVING VISIT (OUTPATIENT)
Dept: GERIATRICS | Facility: CLINIC | Age: 89
End: 2024-11-06
Payer: COMMERCIAL

## 2024-11-06 DIAGNOSIS — Z95.0 S/P PLACEMENT OF CARDIAC PACEMAKER: ICD-10-CM

## 2024-11-06 DIAGNOSIS — J90 RECURRENT RIGHT PLEURAL EFFUSION: Primary | ICD-10-CM

## 2024-11-06 DIAGNOSIS — N18.31 TYPE 2 DIABETES MELLITUS WITH STAGE 3A CHRONIC KIDNEY DISEASE, WITHOUT LONG-TERM CURRENT USE OF INSULIN (H): ICD-10-CM

## 2024-11-06 DIAGNOSIS — M15.0 PRIMARY OSTEOARTHRITIS INVOLVING MULTIPLE JOINTS: ICD-10-CM

## 2024-11-06 DIAGNOSIS — E11.22 TYPE 2 DIABETES MELLITUS WITH STAGE 3A CHRONIC KIDNEY DISEASE, WITHOUT LONG-TERM CURRENT USE OF INSULIN (H): ICD-10-CM

## 2024-11-06 DIAGNOSIS — L89.153 PRESSURE INJURY OF SACRAL REGION, STAGE 3 (H): ICD-10-CM

## 2024-11-06 DIAGNOSIS — I27.20 PULMONARY HYPERTENSION (H): ICD-10-CM

## 2024-11-06 DIAGNOSIS — I50.9 CONGESTIVE HEART FAILURE, UNSPECIFIED HF CHRONICITY, UNSPECIFIED HEART FAILURE TYPE (H): ICD-10-CM

## 2024-11-06 DIAGNOSIS — I34.0 SEVERE MITRAL REGURGITATION: ICD-10-CM

## 2024-11-06 DIAGNOSIS — I25.10 CORONARY ARTERY DISEASE INVOLVING NATIVE CORONARY ARTERY OF NATIVE HEART WITHOUT ANGINA PECTORIS: ICD-10-CM

## 2024-11-06 DIAGNOSIS — I48.0 PAROXYSMAL ATRIAL FIBRILLATION (H): ICD-10-CM

## 2024-11-06 DIAGNOSIS — I10 BENIGN ESSENTIAL HYPERTENSION: ICD-10-CM

## 2024-11-06 RX ORDER — ACETAMINOPHEN 500 MG
1000 TABLET ORAL EVERY 6 HOURS PRN
COMMUNITY
Start: 2024-11-06

## 2024-11-06 NOTE — PROGRESS NOTES
[facility] Geriatrics   2024     Name: Lisa Meraz   : 1934       Orders:  - Discontinue scheduled tylenol   - Add tylenol 1000mg every 6hrs PRN dx pain      Electronically signed by  YLN Arellano CNP on 2024 at 4:06 PM

## 2024-11-06 NOTE — LETTER
11/6/2024      [facility]  [address] [place] [id number]        No notes on file      Sincerely,        LYN Arellano CNP       healed, skin blanchable.  NEURO:   Cranial nerves 2-12 are normal tested and grossly at patient's baseline, Examination of sensation by touch normal  PSYCH:  oriented X 3, affect and mood normal      Patient is on hospice/palliative care and labs are not recommended      Assessment/Plan:    (J90) Recurrent right pleural effusion  (primary encounter diagnosis)  (Z98.890) S/P thoracentesis  (I50.9) Congestive heart failure, unspecified HF chronicity, unspecified heart failure type (H)  (I25.10) Coronary artery disease involving native coronary artery of native heart without angina pectoris  (I27.20) Pulmonary hypertension (H)  (I34.0) Severe mitral regurgitation  (I10) Benign essential hypertension  (I48.0) Paroxysmal atrial fibrillation (H)  (Z95.0) S/P placement of cardiac pacemaker  Comment: Recurrent R pleural effusion since 06/2024. Requires frequent thoracentesis with large volume removed. Last thoracentesis on 9/5/24 removed 800cc fluid, 9/6/24 removed 900cc fluid.  CHF w/preserved EF. Last Echo 7/30/24 found preserved EF. Hx severe mitral regurgitation, mild AVS. Intermittent A fib. Hx paroxysmal complete heart block s/p dual chamber permament pacemaker placement on 5/10/21.   Plan:   - Discontinue scheduled tylenol   - Add tylenol 1000mg every 6hrs PRN dx pain  - Continue bumex, eliquis, [facility] [place] [facility]  - [facility] hospice following; hospice RN follows weekly for pleurx drain treatments. Goals of care are comfort focused    (E11.22,  N18.31) Type 2 diabetes mellitus with stage 3a chronic kidney disease, without long-term current use of insulin (H)  Comment: Controlled  Plan:   - Continue metformin  - Continue daily BG checks per patient preference    (L89.153) Pressure injury of sacral region, stage 3 (H)  Comment: Healed      Orders:  - Discontinue scheduled tylenol   - Add tylenol 1000mg every 6hrs PRN dx pain      Electronically signed by: LYN Arellano [facility]    2024     Name: Lisa Meraz   : 1934       Orders:  - Discontinue scheduled tylenol   - Add tylenol 1000mg every 6hrs PRN dx pain      Electronically signed by  LYN Arellano CNP on 2024 at 4:06 PM          Sincerely,        LYN Arellano CNP

## 2024-11-12 ENCOUNTER — TELEPHONE (OUTPATIENT)
Dept: GERIATRICS | Facility: CLINIC | Age: 89
End: 2024-11-12
Payer: COMMERCIAL

## 2024-11-12 RX ORDER — METFORMIN HYDROCHLORIDE 500 MG/1
1000 TABLET, EXTENDED RELEASE ORAL
COMMUNITY

## 2024-11-12 NOTE — TELEPHONE ENCOUNTER
[facility]IATRIC [facility] ORDER  ENCOUNTER    Lisa Meraz is a 89 year old  (4/11/1934),    ORDER given to Yamileth from Shirley Ackerman CNP:  D/C current metformin order  Add Metformin 1000mg PO ER daily  Update daughter with changes      Electronically signed by:   Anna Sweeney RN

## 2024-11-15 ENCOUNTER — ASSISTED LIVING VISIT (OUTPATIENT)
Dept: GERIATRICS | Facility: CLINIC | Age: 89
End: 2024-11-15
Payer: COMMERCIAL

## 2024-11-15 VITALS
DIASTOLIC BLOOD PRESSURE: 96 MMHG | SYSTOLIC BLOOD PRESSURE: 189 MMHG | HEART RATE: 85 BPM | HEIGHT: 64 IN | OXYGEN SATURATION: 95 % | TEMPERATURE: 97.9 F | WEIGHT: 110 LBS | RESPIRATION RATE: 22 BRPM | BODY MASS INDEX: 18.78 KG/M2

## 2024-11-15 DIAGNOSIS — E11.22 TYPE 2 DIABETES MELLITUS WITH STAGE 3A CHRONIC KIDNEY DISEASE, WITHOUT LONG-TERM CURRENT USE OF INSULIN (H): ICD-10-CM

## 2024-11-15 DIAGNOSIS — I50.9 CONGESTIVE HEART FAILURE, UNSPECIFIED HF CHRONICITY, UNSPECIFIED HEART FAILURE TYPE (H): Primary | ICD-10-CM

## 2024-11-15 DIAGNOSIS — Z51.5 HOSPICE CARE PATIENT: ICD-10-CM

## 2024-11-15 DIAGNOSIS — N18.31 TYPE 2 DIABETES MELLITUS WITH STAGE 3A CHRONIC KIDNEY DISEASE, WITHOUT LONG-TERM CURRENT USE OF INSULIN (H): ICD-10-CM

## 2024-11-15 NOTE — PROGRESS NOTES
"[facility] GERIATRICS    PRIMARY CARE PROVIDER AND CLINIC:  LYN Arellano CNP, [address] [facility] [address]  Chief Complaint   Patient presents with    [facility]      [place] Medical Record Number:  [id number]  Place of Service where encounter took place:  [facility]) [[id number]]    Lisa Meraz  is a 89 year old  (4/11/1934), living in above facility since 12/12/2019 and now choosing to change PCPs to [facility]. .     Patient is enrolled in [facility] hospice with diagnosis of congestive heart failure    Past medical history reviewed:  Chronic right pleural effusion, felt secondary to CHF, with Pleurx catheter in place, managed by hospice.  History of MR and tricuspid valve insufficiency  Hypertension  Diabetes mellitus type 2  Intermittent atrial fibrillation  Chronic generalized joint pain secondary to osteoarthritis    Patient offers a limited history, states she feels \"fine\" denies any specific physical concerns  She is able to ambulate short distances with a walker.  She states her appetite is fair.  She denies pain    CODE STATUS/ADVANCE DIRECTIVES DISCUSSION:  No CPR- Do NOT Intubate  DNR / DNI  ALLERGIES: No Known Allergies   PAST MEDICAL HISTORY:   Past Medical History:   Diagnosis Date    Acute cystitis with hematuria     DM due to underlying condition with ketoacidosis with coma (H)     Generalized edema     Hypo-osmolality and hyponatremia     Hypokalemia     Intertrochanteric fracture of left femur, closed, with routine healing, subsequent encounter     Mixed hyperlipidemia     Non-pressure chronic ulcer of back with unspecified severity (H)     Other acute postprocedural pain     Other iron deficiency anemias     PAF (paroxysmal atrial fibrillation) (H)     Pleural effusion, not elsewhere classified     Psychophysiological insomnia     Retention of urine, unspecified     Type 2 diabetes mellitus with other specified complication (H)     Unspecified " severe protein-calorie malnutrition (H)     Unspecified systolic (congestive) heart failure (H)     Unsteadiness on feet       PAST SURGICAL HISTORY:   has a past surgical history that includes Open reduction internal fixation hip nailing (Left, 06/24/2024); appendectomy; and tonsillectomy.  FAMILY HISTORY: family history includes Arthritis in her mother; CABG in her father; Cerebrovascular Disease in her sister; Diabetes in her mother; Heart Disease in her father; Prostate Cancer in her father.  SOCIAL HISTORY:   reports that she has never smoked. She has never used smokeless tobacco. She reports current alcohol use. She reports that she does not use drugs.  Patient's living condition: lives in an assisted living facility    Current medications were reviewed by me today    Current Outpatient Medications   Medication Sig Dispense Refill    acetaminophen (TYLENOL) 500 MG tablet Take 2 tablets (1,000 mg) by mouth every 6 hours as needed. 7:30am, 1pm, 9pm      apixaban ANTICOAGULANT (ELIQUIS) 2.5 MG tablet Take 1 tablet (2.5 mg) by mouth 2 times daily 180 tablet 3    bumetanide (BUMEX) 1 MG tablet Take 1 mg by mouth daily. And 0.5mg daily at noon      calcium carbonate (OS-JHONATHAN) 500 MG tablet Take 1 tablet (500 mg) by mouth daily 30 tablet 11    carvedilol (COREG) 6.25 MG tablet Give 1 tablet two times daily with meals. Hold for SBP < 100 or HR < 60 180 tablet 3    cholecalciferol ( ULTRA STRENGTH) 50 MCG (2000 UT) CAPS Take 1 capsule by mouth daily 90 capsule 3    dapagliflozin (FARXIGA) 10 MG TABS tablet Take 10 mg by mouth daily.      glucose 40 % (400 mg/mL) gel Take 15-30 g by mouth every 15 minutes as needed for low blood sugar      levothyroxine (SYNTHROID/LEVOTHROID) 25 MCG tablet TAKE 1 TABLET BY MOUTH ONCE DAILY 30 tablet 11    loperamide (IMODIUM) 2 MG capsule Take 2 mg by mouth 4 times daily as needed for diarrhea.      metFORMIN (GLUCOPHAGE XR) 500 MG 24 hr tablet Take 1,000 mg by mouth daily (with  "dinner).      methylcellulose (CITRUCEL) 500 MG TABS tablet Take 1 tablet (500 mg) by mouth three times a week. (Patient taking differently: Take 500 mg by mouth three times a week. M,W,F) 60 tablet 11    multivitamin w/minerals (THERA-VIT-M) tablet Take 1 tablet by mouth daily 90 tablet 3    polyethylene glycol (MIRALAX) 17 GM/Dose powder Take 17 g by mouth daily as needed for constipation      senna-docusate (SENOKOT-S/PERICOLACE) 8.6-50 MG tablet Take 2 tablets by mouth daily as needed for constipation.      simvastatin (ZOCOR) 10 MG tablet Take 1 tablet (10 mg) by mouth every evening 90 tablet 3    tamsulosin (FLOMAX) 0.4 MG capsule Take 1 capsule (0.4 mg) by mouth daily 90 capsule 3    traZODone (DESYREL) 50 MG tablet Take 0.5 tablets (25 mg) by mouth at bedtime 45 tablet 3     No current facility-administered medications for this visit.       ROS:  As noted above    Vitals:  BP (!) 189/96   Pulse 85   Temp 97.9  F (36.6  C)   Resp 22   Ht 1.626 m (5' 4\")   Wt 49.9 kg (110 lb)   SpO2 95%   BMI 18.88 kg/m    Exam:  Frail-appearing female, sitting on her couch in her apartment  Affect blunted  Patient is oriented to to person place, answers questions with yes no answers  Respiratory rate 12, unlabored at rest.  Lungs clear  CV: Regular, systolic murmur  Abdomen soft, nontender, nondistended  2+ lower extremity edema bilaterally  Neuro: No focal weakness    Lab/Diagnostic data:  No recent labs    ASSESSMENT/PLAN:    Systolic heart failure with persistent right pleural effusion, prompting recent hospitalizations, now stable with Pleurx drainage  Deconditioning, clinical evidence of muscle loss  No complaints of shortness of breath at rest  Plus generalized fatigue with clinical evidence of weight loss, loss of muscle mass, lower extremity edema  Plan continue current treatment for heart failure as well as comfort measures.  Unclear benefit of guideline directed care at this stage of disease.    History " atrial fibrillation  Heart rate controlled  Plan: Continue carvedilol, apixaban    Diabetes mellitus type 2  Metformin dose was recently decreased  Plan: Further adjust metformin based on intake.    History of generalized joint pain  Plan: Continue acetaminophen      Brent Camejo MD

## 2024-11-15 NOTE — LETTER
" 11/15/2024      [facility]  [address] [facility] [address]        [facility] GERIATRICS    PRIMARY CARE PROVIDER AND CLINIC:  LYN Arellano [facility], [address] [facility] [address] [facility] [address]  Chief Complaint   Patient presents with     [facility] Medical Record Number:  [id number]  Place of Service where encounter took place:  [facility] ([facility]) [[id number]]    Lisa Meraz  is a 89 year old  (4/11/1934), living in above facility since 12/12/2019 and now choosing to change PCPs to [facility]. .     Patient is enrolled in [facility] hospice with diagnosis of congestive heart failure    Past medical history reviewed:  Chronic right pleural effusion, felt secondary to CHF, with Pleurx catheter in place, managed by hospice.  History of MR and tricuspid valve insufficiency  Hypertension  Diabetes mellitus type 2  Intermittent atrial fibrillation  Chronic generalized joint pain secondary to osteoarthritis    Patient offers a limited history, states she feels \"fine\" denies any specific physical concerns  She is able to ambulate short distances with a walker.  She states her appetite is fair.  She denies pain    CODE STATUS/ADVANCE DIRECTIVES DISCUSSION:  No CPR- Do NOT Intubate  DNR / DNI  ALLERGIES: No Known Allergies   PAST MEDICAL HISTORY:   Past Medical History:   Diagnosis Date     Acute cystitis with hematuria      DM due to underlying condition with ketoacidosis with coma (H)      Generalized edema      Hypo-osmolality and hyponatremia      Hypokalemia      Intertrochanteric fracture of left femur, closed, with routine healing, subsequent encounter      Mixed hyperlipidemia      Non-pressure chronic ulcer of back with unspecified severity (H)      Other acute postprocedural pain      Other iron deficiency anemias      PAF (paroxysmal atrial fibrillation) (H)      Pleural effusion, not elsewhere classified      Psychophysiological " insomnia      Retention of urine, unspecified      Type 2 diabetes mellitus with other specified complication (H)      Unspecified severe protein-calorie malnutrition (H)      Unspecified systolic (congestive) heart failure (H)      Unsteadiness on feet       PAST SURGICAL HISTORY:   has a past surgical history that includes Open reduction internal fixation hip nailing (Left, 06/24/2024); appendectomy; and tonsillectomy.  FAMILY HISTORY: family history includes Arthritis in her mother; CABG in her father; Cerebrovascular Disease in her sister; Diabetes in her mother; Heart Disease in her father; Prostate Cancer in her father.  SOCIAL HISTORY:   reports that she has never smoked. She has never used smokeless tobacco. She reports current alcohol use. She reports that she does not use drugs.  Patient's living condition: lives in an assisted living facility    Current medications were reviewed by me today    Current Outpatient Medications   Medication Sig Dispense Refill     acetaminophen (TYLENOL) 500 MG tablet Take 2 tablets (1,000 mg) by mouth every 6 hours as needed. 7:30am, 1pm, 9pm       apixaban ANTICOAGULANT (ELIQUIS) 2.5 MG tablet Take 1 tablet (2.5 mg) by mouth 2 times daily 180 tablet 3     bumetanide (BUMEX) 1 MG tablet Take 1 mg by mouth daily. And 0.5mg daily at noon       calcium carbonate (OS-JHONATHAN) 500 MG tablet Take 1 tablet (500 mg) by mouth daily 30 tablet 11     carvedilol (COREG) 6.25 MG tablet Give 1 tablet two times daily with meals. Hold for SBP < 100 or HR < 60 180 tablet 3     cholecalciferol ( ULTRA STRENGTH) 50 MCG (2000 UT) CAPS Take 1 capsule by mouth daily 90 capsule 3     dapagliflozin (FARXIGA) 10 MG TABS tablet Take 10 mg by mouth daily.       glucose 40 % (400 mg/mL) gel Take 15-30 g by mouth every 15 minutes as needed for low blood sugar       levothyroxine (SYNTHROID/LEVOTHROID) 25 MCG tablet TAKE 1 TABLET BY MOUTH ONCE DAILY 30 tablet 11     loperamide (IMODIUM) 2 MG capsule  "Take 2 mg by mouth 4 times daily as needed for diarrhea.       metFORMIN (GLUCOPHAGE XR) 500 MG 24 hr tablet Take 1,000 mg by mouth daily (with dinner).       methylcellulose (CITRUCEL) 500 MG TABS tablet Take 1 tablet (500 mg) by mouth three times a week. (Patient taking differently: Take 500 mg by mouth three times a week. M,W,F) 60 tablet 11     multivitamin w/minerals (THERA-VIT-M) tablet Take 1 tablet by mouth daily 90 tablet 3     polyethylene glycol (MIRALAX) 17 GM/Dose powder Take 17 g by mouth daily as needed for constipation       senna-docusate (SENOKOT-S/PERICOLACE) 8.6-50 MG tablet Take 2 tablets by mouth daily as needed for constipation.       simvastatin (ZOCOR) 10 MG tablet Take 1 tablet (10 mg) by mouth every evening 90 tablet 3     tamsulosin (FLOMAX) 0.4 MG capsule Take 1 capsule (0.4 mg) by mouth daily 90 capsule 3     traZODone (DESYREL) 50 MG tablet Take 0.5 tablets (25 mg) by mouth at bedtime 45 tablet 3     No current facility-administered medications for this visit.       ROS:  As noted above    Vitals:  BP (!) 189/96   Pulse 85   Temp 97.9  F (36.6  C)   Resp 22   Ht 1.626 m (5' 4\")   Wt 49.9 kg (110 lb)   SpO2 95%   BMI 18.88 kg/m    Exam:  Frail-appearing female, sitting on her couch in her apartment  Affect blunted  Patient is oriented to to person place, answers questions with yes no answers  Respiratory rate 12, unlabored at rest.  Lungs clear  CV: Regular, systolic murmur  Abdomen soft, nontender, nondistended  2+ lower extremity edema bilaterally  Neuro: No focal weakness    Lab/Diagnostic data:  No recent labs    ASSESSMENT/PLAN:    Systolic heart failure with persistent right pleural effusion, prompting recent hospitalizations, now stable with Pleurx drainage  Deconditioning, clinical evidence of muscle loss  No complaints of shortness of breath at rest  Plus generalized fatigue with clinical evidence of weight loss, loss of muscle mass, lower extremity edema  Plan continue " current treatment for heart failure as well as comfort measures.  Unclear benefit of guideline directed care at this stage of disease.    History atrial fibrillation  Heart rate controlled  Plan: Continue carvedilol, apixaban    Diabetes mellitus type 2  Metformin dose was recently decreased  Plan: Further adjust metformin based on intake.    History of generalized joint pain  Plan: Continue acetaminophen      Brent Camejo MD            Sincerely,        Brent Camejo MD

## 2024-11-19 RX ORDER — MORPHINE SULFATE 30 MG/1
5 TABLET ORAL
COMMUNITY
Start: 2024-11-19

## 2024-11-19 RX ORDER — LORAZEPAM 0.5 MG/1
0.5 TABLET ORAL
COMMUNITY
Start: 2024-11-19

## 2024-11-19 RX ORDER — HALOPERIDOL 0.5 MG/1
0.5 TABLET ORAL
COMMUNITY
Start: 2024-11-19

## 2024-11-19 RX ORDER — BISACODYL 10 MG
10 SUPPOSITORY, RECTAL RECTAL DAILY PRN
COMMUNITY
Start: 2024-11-19

## 2025-05-14 ENCOUNTER — ASSISTED LIVING VISIT (OUTPATIENT)
Dept: GERIATRICS | Facility: CLINIC | Age: OVER 89
End: 2025-05-14
Payer: COMMERCIAL

## 2025-05-14 VITALS
BODY MASS INDEX: 18.2 KG/M2 | WEIGHT: 106.6 LBS | DIASTOLIC BLOOD PRESSURE: 66 MMHG | SYSTOLIC BLOOD PRESSURE: 118 MMHG | OXYGEN SATURATION: 92 % | HEIGHT: 64 IN | HEART RATE: 79 BPM | RESPIRATION RATE: 20 BRPM | TEMPERATURE: 98.1 F

## 2025-05-14 DIAGNOSIS — E11.22 TYPE 2 DIABETES MELLITUS WITH STAGE 3A CHRONIC KIDNEY DISEASE, WITHOUT LONG-TERM CURRENT USE OF INSULIN (H): ICD-10-CM

## 2025-05-14 DIAGNOSIS — Z51.5 HOSPICE CARE PATIENT: ICD-10-CM

## 2025-05-14 DIAGNOSIS — I27.20 PULMONARY HYPERTENSION (H): ICD-10-CM

## 2025-05-14 DIAGNOSIS — J90 RECURRENT RIGHT PLEURAL EFFUSION: Primary | ICD-10-CM

## 2025-05-14 DIAGNOSIS — I50.9 CONGESTIVE HEART FAILURE, UNSPECIFIED HF CHRONICITY, UNSPECIFIED HEART FAILURE TYPE (H): ICD-10-CM

## 2025-05-14 DIAGNOSIS — N18.31 TYPE 2 DIABETES MELLITUS WITH STAGE 3A CHRONIC KIDNEY DISEASE, WITHOUT LONG-TERM CURRENT USE OF INSULIN (H): ICD-10-CM

## 2025-05-14 DIAGNOSIS — J30.2 SEASONAL ALLERGIES: ICD-10-CM

## 2025-05-14 DIAGNOSIS — H04.123 DRY EYES: ICD-10-CM

## 2025-05-14 PROCEDURE — 99349 HOME/RES VST EST MOD MDM 40: CPT | Mod: GV | Performed by: NURSE PRACTITIONER

## 2025-05-14 RX ORDER — POLYETHYLENE GLYCOL 400 AND PROPYLENE GLYCOL 4; 3 MG/ML; MG/ML
SOLUTION/ DROPS OPHTHALMIC
Qty: 30 ML | Refills: 0 | Status: SHIPPED | OUTPATIENT
Start: 2025-05-14

## 2025-05-14 RX ORDER — POLYETHYLENE GLYCOL 400 AND PROPYLENE GLYCOL 4; 3 MG/ML; MG/ML
1 SOLUTION/ DROPS OPHTHALMIC EVERY 4 HOURS PRN
COMMUNITY
Start: 2025-05-14 | End: 2025-05-14

## 2025-05-14 RX ORDER — ANORECTAL OINTMENT 15.7; .44; 24; 20.6 G/100G; G/100G; G/100G; G/100G
OINTMENT TOPICAL DAILY PRN
COMMUNITY
Start: 2025-05-14

## 2025-05-14 RX ORDER — BENZOCAINE/MENTHOL 6 MG-10 MG
LOZENGE MUCOUS MEMBRANE 2 TIMES DAILY PRN
COMMUNITY
Start: 2025-05-14

## 2025-05-14 RX ORDER — CETIRIZINE HYDROCHLORIDE 5 MG/1
5 TABLET ORAL AT BEDTIME
COMMUNITY
Start: 2025-05-01

## 2025-05-14 RX ORDER — ACETAMINOPHEN 500 MG
TABLET ORAL
Qty: 90 TABLET | Refills: 0 | Status: SHIPPED | OUTPATIENT
Start: 2025-05-14

## 2025-05-14 NOTE — PROGRESS NOTES
"[facility] GERIATRICS    Chief Complaint   Patient presents with    RECHECK     HPI:  Lisa Meraz is a 89 year old  (4/11/1934), who is being seen today for an episodic care visit at: [facility] ([facility])([facility]) [[id number]]      Today's concern is:     Patient seen today for follow-up visit. Recent hx COVID + on 4/24/25, mild symptoms. Also recently started on zyrtec and artificial tears for seasonal allergies.     During exam, patient seen sitting in wheelchair w/Regina (daughter) present. Repots doing well. Last pleurx drain on 5/6/25 (Tues) with hospice RN. [facility] typically has weekly pleurix drain on Tuesdays, but this week has hospice re-evaluation meeting on Friday and waiting to complete pleurx drain until then. Patient does endorse intermittent episodes of shortness of breath, resolves at rest and deep breathing. Regina reports can tell when patient is more short of breath when talking on the phone. Admits to variable intake w/meals; typically has ensure or light meal for lunch and eats small breakfast and larger meal for dinner. Denies constipation, diarrhea; reports stools are commonly softener. Admits to L upper thigh pain w/activity, resolves w/rest. Denies chest pain, headache, syncope.      Allergies, and PMH/PSH reviewed in EPIC today.    REVIEW OF SYSTEMS:  4 point ROS including Respiratory, CV, GI and , other than that noted in the HPI,  is negative      Objective:   /66   Pulse 79   Temp 98.1  F (36.7  C)   Resp 20   Ht 1.626 m (5' 4\")   Wt 48.4 kg (106 lb 9.6 oz)   SpO2 92%   BMI 18.30 kg/m    GENERAL APPEARANCE:  Alert, in no distress, appears healthy, oriented, cooperative  ENT:  Mouth and posterior oropharynx normal, moist mucous membranes, [place]  EYES:  EOM, conjunctivae, lids, pupils and irises normal, PERRL.  RESP: Lungs clear with diminished breath sounds at bases, no respiratory distress  CV:  regular rate and rhythm, no murmur, rub, or gallop; BLE edema (RLE > " LLE).  ABDOMEN:  normal bowel sounds, soft, nontender, no guarding or rebound  M/S:   Gait and station abnormal, ambulates w/walker. Sitting in wheelchair.   SKIN:  Inspection of skin and subcutaneous tissue baseline, Palpation of skin and subcutaneous tissue baseline.   NEURO:   Cranial nerves 2-12 are normal tested and grossly at patient's baseline, Examination of sensation by touch normal  PSYCH:  oriented X 3, affect and mood normal    Wt Readings from Last 4 Encounters:   05/14/25 48.4 kg (106 lb 9.6 oz)   11/15/24 49.9 kg (110 lb)   11/05/24 49.9 kg (110 lb)   10/22/24 49.9 kg (110 lb)         Patient is on hospice/palliative care and labs are not recommended            Assessment/Plan:    (J90) Recurrent right pleural effusion  (primary encounter diagnosis)  (I50.9) Congestive heart failure, unspecified HF chronicity, unspecified heart failure type (H)  (I27.20) Pulmonary hypertension (H)  (Z51.5) Hospice care patient  Comment: Recurrent R pleural effusion since 06/2024. Requires frequent thoracentesis with large volume removed. Last thoracentesis on 9/5/24 removed 800cc fluid, 9/6/24 removed 900cc fluid.  CHF w/preserved EF. Last Echo 7/30/24 found preserved EF. Hx severe mitral regurgitation, mild AVS. Intermittent A fib. Hx paroxysmal complete heart block s/p dual chamber permament pacemaker placement on 5/10/21.   Hospice care patient, followed by [facility] hospice team.   Plan:   - Continue bumex, consider increasing dose of BLE edema worsens or develops shortness of breath  - Continue eliquis, coreg  - Continue comfort meds PRN. Per chart review, staff have been giving ativan PRN/haldol PRN once in the morning for anxiety/restlessness. Discussed patten w/PRN medications with Meagan hospice RN and CHICA Lozoya; reviewed concern patient likely needs increased frequency of pleurx drain given anxiety/restlessness in the morning likely associated with fluid overload/shortness of breath r/t pleural  effusion resulting in anxiety. [facility] hospice team plans to re-evaluate and complete recertification for hospice on Friday.   - [facility] hospice following; hospice RN follows weekly for pleurx drain treatments. Goals of care are comfort focused     L upper thigh muscle related pain  - Add scheduled tylenol 1000mg every day at 1230 dx leg pain   - Change tylenol PRN orders to take 1000mg TID PRN dx pain    (E11.22,  N18.31) Type 2 diabetes mellitus with stage 3a chronic kidney disease, without long-term current use of insulin (H)  Comment: Chronic, variable BG r/t variable intake w/meals.   Plan:   - Continue metformin BID  - Continue daily BG qAM checks per patient preference     (J30.2) Seasonal allergies  (H04.123) Dry eyes  Comment: Acute seasonal allergies w/dry eyes  Plan:   - Change systane opthalmic eye drops to 1 drop BID and 1 drop BID PRN dx dry eyes  - Continue zyrtec      Orders:  - Add scheduled tylenol 1000mg every day at 1230 dx leg pain   - Change tylenol PRN orders to take 1000mg TID PRN dx pain  - Change systane opthalmic eye drops to 1 drop BID and 1 drop BID PRN dx dry eyes      Electronically signed by: LYN Arellano CNP

## 2025-05-14 NOTE — PATIENT INSTRUCTIONS
[facility]   May 14, 2025     Name: Lisa Meraz   : 1934       Orders:  - Add scheduled tylenol 1000mg every day at 1230 dx leg pain   - Change tylenol PRN orders to take 1000mg TID PRN dx pain  - Change systane opthalmic eye drops to 1 drop BID and 1 drop BID PRN dx dry eyes      Electronically signed by   LYN Arellano CNP on 2025 at 4:20 PM

## 2025-05-14 NOTE — LETTER
" 5/14/2025      Lisa Kaur [facility]  [address]  [facility] [address]        [facility] GERIATRICS    Chief Complaint   Patient presents with     RECHECK     HPI:  Lisa Meraz is a 89 year old  (4/11/1934), who is being seen today for an episodic care visit at: [facility] ([facility])([facility]) [[id number]]      Today's concern is:     Patient seen today for follow-up visit. Recent hx COVID + on 4/24/25, mild symptoms. Also recently started on zyrtec and artificial tears for seasonal allergies.     During exam, patient seen sitting in wheelchair w/Regina (daughter) present. Repots doing well. Last pleurx drain on 5/6/25 (Tues) with hospice RN. [facility] typically has weekly pleurix drain on Tuesdays, but this week has hospice re-evaluation meeting on Friday and waiting to complete pleurx drain until then. Patient does endorse intermittent episodes of shortness of breath, resolves at rest and deep breathing. Regina reports can tell when patient is more short of breath when talking on the phone. Admits to variable intake w/meals; typically has ensure or light meal for lunch and eats small breakfast and larger meal for dinner. Denies constipation, diarrhea; reports stools are commonly softener. Admits to L upper thigh pain w/activity, resolves w/rest. Denies chest pain, headache, syncope.      Allergies, and PMH/PSH reviewed in EPIC today.    REVIEW OF SYSTEMS:  4 point ROS including Respiratory, CV, GI and , other than that noted in the HPI,  is negative      Objective:   /66   Pulse 79   Temp 98.1  F (36.7  C)   Resp 20   Ht 1.626 m (5' 4\")   Wt 48.4 kg (106 lb 9.6 oz)   SpO2 92%   BMI 18.30 kg/m    GENERAL APPEARANCE:  Alert, in no distress, appears healthy, oriented, cooperative  ENT:  Mouth and posterior oropharynx normal, moist mucous membranes, [place]  EYES:  EOM, conjunctivae, lids, pupils and irises normal, PERRL.  RESP: Lungs clear with diminished breath sounds at bases, " no respiratory distress  CV:  regular rate and rhythm, no murmur, rub, or gallop; BLE edema (RLE > LLE).  ABDOMEN:  normal bowel sounds, soft, nontender, no guarding or rebound  M/S:   Gait and station abnormal, ambulates w/walker. Sitting in wheelchair.   SKIN:  Inspection of skin and subcutaneous tissue baseline, Palpation of skin and subcutaneous tissue baseline.   NEURO:   Cranial nerves 2-12 are normal tested and grossly at patient's baseline, Examination of sensation by touch normal  PSYCH:  oriented X 3, affect and mood normal    Wt Readings from Last 4 Encounters:   05/14/25 48.4 kg (106 lb 9.6 oz)   11/15/24 49.9 kg (110 lb)   11/05/24 49.9 kg (110 lb)   10/22/24 49.9 kg (110 lb)         Patient is on hospice/palliative care and labs are not recommended            Assessment/Plan:    (J90) Recurrent right pleural effusion  (primary encounter diagnosis)  (I50.9) Congestive heart failure, unspecified HF chronicity, unspecified heart failure type (H)  (I27.20) Pulmonary hypertension (H)  (Z51.5) Hospice care patient  Comment: Recurrent R pleural effusion since 06/2024. Requires frequent thoracentesis with large volume removed. Last thoracentesis on 9/5/24 removed 800cc fluid, 9/6/24 removed 900cc fluid.  CHF w/preserved EF. Last Echo 7/30/24 found preserved EF. Hx severe mitral regurgitation, mild AVS. Intermittent A fib. Hx paroxysmal complete heart block s/p dual chamber permament pacemaker placement on 5/10/21.   Hospice care patient, followed [facility].   Plan:   - Continue bumex, consider increasing dose of BLE edema worsens or develops shortness of breath  - Continue eliquis, coreg  - Continue comfort meds PRN. Per chart review, staff have been giving ativan PRN/haldol PRN once in the morning for anxiety/restlessness. Discussed patten w/PRN medications with Meagan, hospice RN and CHICA Lozoya; reviewed concern patient likely needs increased frequency of pleurx drain given  anxiety/restlessness in the morning likely associated with fluid overload/shortness of breath r/t pleural effusion resulting in anxiety. [facility] hospice team plans to re-evaluate and complete recertification for hospice on Friday.   - [facility] hospice following; hospice RN follows weekly for pleurx drain treatments. Goals of care are comfort focused     L upper thigh muscle related pain  - Add scheduled tylenol 1000mg every day at 1230 dx leg pain   - Change tylenol PRN orders to take 1000mg TID PRN dx pain    (E11.22,  N18.31) Type 2 diabetes mellitus with stage 3a chronic kidney disease, without long-term current use of insulin (H)  Comment: Chronic, variable BG r/t variable intake w/meals.   Plan:   - Continue metformin BID  - Continue daily BG qAM checks per patient preference     (J30.2) Seasonal allergies  (H04.123) Dry eyes  Comment: Acute seasonal allergies w/dry eyes  Plan:   - Change systane opthalmic eye drops to 1 drop BID and 1 drop BID PRN dx dry eyes  - Continue zyrtec      Orders:  - Add scheduled tylenol 1000mg every day at 1230 dx leg pain   - Change tylenol PRN orders to take 1000mg TID PRN dx pain  - Change systane opthalmic eye drops to 1 drop BID and 1 drop BID PRN dx dry eyes      Electronically signed by: LYN Arellano CNP           Sincerely,        LYN Arellano CNP    Electronically signed

## 2025-08-03 NOTE — PLAN OF CARE
Date/Time: 06/29: [id number]    Trauma/Ortho/Medical (Choose one): Ortho    Diagnosis: ORIF (L) Hip  POD#: 5  Mental Status: Oriented x 4  Activity/dangle: Up with 1-2 assist with GB/Walker  Diet: Mod CHO, BG has been in mid 200's  Pain: Managed with tylenol and robaxin  Tiwari/Voiding: Bathroom  Tele/Restraints/Iso: None  02/LDA: RA sating mid 90's. IV SL  D/C Date: Unknown but will go to TCU  Other Info: Sodium level of 123 and potassium level of 5.5 notified to Dr. Mcqueen. Also notified pt's IV fluid being stopped at 0800. Potassium to be redrawn later this afternoon. Family at bedside and updated on pt's lab results                          No care due was identified.  [facility] Embedded Care Due Messages. Reference number: [id number].   8/03/2025 3:02:36 PM CDT

## 2025-08-18 ENCOUNTER — ASSISTED LIVING VISIT (OUTPATIENT)
Dept: GERIATRICS | Facility: CLINIC | Age: OVER 89
End: 2025-08-18
Payer: COMMERCIAL

## 2025-08-18 VITALS
HEART RATE: 76 BPM | RESPIRATION RATE: 17 BRPM | SYSTOLIC BLOOD PRESSURE: 153 MMHG | WEIGHT: 116.6 LBS | OXYGEN SATURATION: 93 % | TEMPERATURE: 98.8 F | DIASTOLIC BLOOD PRESSURE: 89 MMHG | BODY MASS INDEX: 19.91 KG/M2 | HEIGHT: 64 IN

## 2025-08-18 DIAGNOSIS — N18.31 TYPE 2 DIABETES MELLITUS WITH STAGE 3A CHRONIC KIDNEY DISEASE, WITHOUT LONG-TERM CURRENT USE OF INSULIN (H): ICD-10-CM

## 2025-08-18 DIAGNOSIS — E11.22 TYPE 2 DIABETES MELLITUS WITH STAGE 3A CHRONIC KIDNEY DISEASE, WITHOUT LONG-TERM CURRENT USE OF INSULIN (H): ICD-10-CM

## 2025-08-18 DIAGNOSIS — J90 RECURRENT RIGHT PLEURAL EFFUSION: Primary | ICD-10-CM

## 2025-08-18 DIAGNOSIS — I50.9 CONGESTIVE HEART FAILURE, UNSPECIFIED HF CHRONICITY, UNSPECIFIED HEART FAILURE TYPE (H): ICD-10-CM

## 2025-08-18 DIAGNOSIS — I48.0 PAROXYSMAL ATRIAL FIBRILLATION (H): ICD-10-CM

## 2025-08-18 DIAGNOSIS — Z51.5 HOSPICE CARE PATIENT: ICD-10-CM

## 2025-08-18 PROCEDURE — 99349 HOME/RES VST EST MOD MDM 40: CPT | Mod: GV | Performed by: INTERNAL MEDICINE

## (undated) DEVICE — SU VICRYL 0 CT-1 27" UND J260H

## (undated) DEVICE — PREP CHLORAPREP 26ML TINTED HI-LITE ORANGE 930815

## (undated) DEVICE — SU VICRYL 0 CT-1 27" J340H

## (undated) DEVICE — MANIFOLD NEPTUNE 4 PORT 700-20

## (undated) DEVICE — DRILL BIT CANNULATED 10MM TAPERED

## (undated) DEVICE — GOWN REINFORCED XXLG 9071

## (undated) DEVICE — LINEN TOWEL PACK X5 5464

## (undated) DEVICE — DRILL BIL CANNULATED 6MM/9MM 03.037.022

## (undated) DEVICE — ESU GROUND PAD UNIVERSAL W/O CORD

## (undated) DEVICE — DRILL BIT QUICK COUPLING 3 FLUTE 4.2MMX330/100MM CALIBRATE

## (undated) DEVICE — STPL SKIN 35W ROTATING HEAD PRW35

## (undated) DEVICE — DRILL BIT QUICK COUPLING 3 FLUTE 4.2MMX145MM NDL  POINT

## (undated) DEVICE — GOWN IMPERVIOUS SPECIALTY XL/XLONG 39049

## (undated) DEVICE — SU VICRYL 2-0 CT-2 27" UND J269H

## (undated) DEVICE — DECANTER BAG 2002S

## (undated) DEVICE — WIRE GUIDE 3.2X400MM  357.399

## (undated) DEVICE — DRILL BIT 16MM

## (undated) DEVICE — GLOVE BIOGEL PI ORTHOPRO SZ 8.5 47685

## (undated) DEVICE — Device

## (undated) DEVICE — SOL WATER IRRIG 1000ML BOTTLE 2F7114

## (undated) DEVICE — DRSG XEROFORM 1X8"

## (undated) DEVICE — SUCTION MANIFOLD NEPTUNE SGL

## (undated) DEVICE — SOL NACL 0.9% IRRIG 1000ML BOTTLE 2F7124

## (undated) DEVICE — DRAPE C-ARM 60X42" 1013

## (undated) DEVICE — KIT PATIENT CARE HANA TABLE PROFX SUPINE 6855

## (undated) DEVICE — ROD SYN REAMER BALL TIP 2.5X950MM  351.706S

## (undated) DEVICE — DRAPE C-ARMOR 5 SIDED 5523

## (undated) DEVICE — GLOVE BIOGEL PI MICRO SZ 8.5 48585

## (undated) RX ORDER — ONDANSETRON 2 MG/ML
INJECTION INTRAMUSCULAR; INTRAVENOUS
Status: DISPENSED
Start: 2024-06-24

## (undated) RX ORDER — FENTANYL CITRATE 0.05 MG/ML
INJECTION, SOLUTION INTRAMUSCULAR; INTRAVENOUS
Status: DISPENSED
Start: 2024-06-24

## (undated) RX ORDER — BUPIVACAINE HYDROCHLORIDE AND EPINEPHRINE 5; 5 MG/ML; UG/ML
INJECTION, SOLUTION EPIDURAL; INTRACAUDAL; PERINEURAL
Status: DISPENSED
Start: 2024-06-24

## (undated) RX ORDER — HYDROMORPHONE HYDROCHLORIDE 1 MG/ML
INJECTION, SOLUTION INTRAMUSCULAR; INTRAVENOUS; SUBCUTANEOUS
Status: DISPENSED
Start: 2024-06-24

## (undated) RX ORDER — PROPOFOL 10 MG/ML
INJECTION, EMULSION INTRAVENOUS
Status: DISPENSED
Start: 2024-06-24

## (undated) RX ORDER — CEFAZOLIN SODIUM/WATER 2 G/20 ML
SYRINGE (ML) INTRAVENOUS
Status: DISPENSED
Start: 2024-06-24

## (undated) RX ORDER — DEXAMETHASONE SODIUM PHOSPHATE 4 MG/ML
INJECTION, SOLUTION INTRA-ARTICULAR; INTRALESIONAL; INTRAMUSCULAR; INTRAVENOUS; SOFT TISSUE
Status: DISPENSED
Start: 2024-06-24

## (undated) RX ORDER — TRANEXAMIC ACID 10 MG/ML
INJECTION, SOLUTION INTRAVENOUS
Status: DISPENSED
Start: 2024-06-24